# Patient Record
Sex: FEMALE | Race: OTHER | HISPANIC OR LATINO | Employment: UNEMPLOYED | URBAN - METROPOLITAN AREA
[De-identification: names, ages, dates, MRNs, and addresses within clinical notes are randomized per-mention and may not be internally consistent; named-entity substitution may affect disease eponyms.]

---

## 2021-12-30 ENCOUNTER — APPOINTMENT (EMERGENCY)
Dept: RADIOLOGY | Facility: HOSPITAL | Age: 46
End: 2021-12-30
Payer: COMMERCIAL

## 2021-12-30 ENCOUNTER — HOSPITAL ENCOUNTER (OUTPATIENT)
Facility: HOSPITAL | Age: 46
Setting detail: OBSERVATION
Discharge: HOME | End: 2022-01-01
Attending: EMERGENCY MEDICINE | Admitting: STUDENT IN AN ORGANIZED HEALTH CARE EDUCATION/TRAINING PROGRAM
Payer: COMMERCIAL

## 2021-12-30 ENCOUNTER — APPOINTMENT (EMERGENCY)
Dept: CT IMAGING | Facility: HOSPITAL | Age: 46
End: 2021-12-30
Payer: COMMERCIAL

## 2021-12-30 DIAGNOSIS — J10.1 INFLUENZA A: Primary | ICD-10-CM

## 2021-12-30 DIAGNOSIS — I10 PRIMARY HYPERTENSION: Chronic | ICD-10-CM

## 2021-12-30 DIAGNOSIS — I42.9 CARDIOMYOPATHY, UNSPECIFIED TYPE (HCC): ICD-10-CM

## 2021-12-30 DIAGNOSIS — E66.9 DIABETES MELLITUS TYPE 2 IN OBESE (HCC): Chronic | ICD-10-CM

## 2021-12-30 DIAGNOSIS — E11.69 DIABETES MELLITUS TYPE 2 IN OBESE (HCC): Chronic | ICD-10-CM

## 2021-12-30 DIAGNOSIS — R09.02 HYPOXIA: ICD-10-CM

## 2021-12-30 LAB
ALBUMIN SERPL BCP-MCNC: 3.5 G/DL (ref 3–5.2)
ALP SERPL-CCNC: 160 U/L (ref 43–122)
ALT SERPL W P-5'-P-CCNC: 34 U/L
ANION GAP SERPL CALCULATED.3IONS-SCNC: 4 MMOL/L (ref 5–14)
AST SERPL W P-5'-P-CCNC: 38 U/L (ref 14–36)
BASOPHILS # BLD AUTO: 0 THOUSANDS/ΜL (ref 0–0.1)
BASOPHILS NFR BLD AUTO: 1 % (ref 0–1)
BILIRUB SERPL-MCNC: 0.49 MG/DL
BUN SERPL-MCNC: 17 MG/DL (ref 5–25)
CALCIUM SERPL-MCNC: 8.6 MG/DL (ref 8.4–10.2)
CARDIAC TROPONIN I PNL SERPL HS: 79 NG/L
CHLORIDE SERPL-SCNC: 95 MMOL/L (ref 97–108)
CO2 SERPL-SCNC: 31 MMOL/L (ref 22–30)
CREAT SERPL-MCNC: 0.98 MG/DL (ref 0.6–1.2)
D DIMER PPP FEU-MCNC: 0.79 UG/ML FEU
EOSINOPHIL # BLD AUTO: 0 THOUSAND/ΜL (ref 0–0.4)
EOSINOPHIL NFR BLD AUTO: 0 % (ref 0–6)
ERYTHROCYTE [DISTWIDTH] IN BLOOD BY AUTOMATED COUNT: 13.5 %
FLUAV RNA RESP QL NAA+PROBE: POSITIVE
FLUBV RNA RESP QL NAA+PROBE: NEGATIVE
GFR SERPL CREATININE-BSD FRML MDRD: 68 ML/MIN/1.73SQ M
GLUCOSE SERPL-MCNC: 457 MG/DL (ref 70–99)
HCT VFR BLD AUTO: 44.5 % (ref 36–46)
HGB BLD-MCNC: 14.8 G/DL (ref 12–16)
LYMPHOCYTES # BLD AUTO: 0.8 THOUSANDS/ΜL (ref 0.5–4)
LYMPHOCYTES NFR BLD AUTO: 11 % (ref 25–45)
MAGNESIUM SERPL-MCNC: 1.6 MG/DL (ref 1.6–2.3)
MCH RBC QN AUTO: 29.3 PG (ref 26–34)
MCHC RBC AUTO-ENTMCNC: 33.2 G/DL (ref 31–36)
MCV RBC AUTO: 88 FL (ref 80–100)
MONOCYTES # BLD AUTO: 0.7 THOUSAND/ΜL (ref 0.2–0.9)
MONOCYTES NFR BLD AUTO: 10 % (ref 1–10)
NEUTROPHILS # BLD AUTO: 5.5 THOUSANDS/ΜL (ref 1.8–7.8)
NEUTS SEG NFR BLD AUTO: 79 % (ref 45–65)
NT-PROBNP SERPL-MCNC: 155 PG/ML (ref 0–299)
PHOSPHATE SERPL-MCNC: 4.1 MG/DL (ref 2.5–4.8)
PLATELET # BLD AUTO: 189 THOUSANDS/UL (ref 150–450)
PMV BLD AUTO: 10.4 FL (ref 8.9–12.7)
POTASSIUM SERPL-SCNC: 4.6 MMOL/L (ref 3.6–5)
PROT SERPL-MCNC: 7.3 G/DL (ref 5.9–8.4)
RBC # BLD AUTO: 5.04 MILLION/UL (ref 4–5.2)
RSV RNA RESP QL NAA+PROBE: NEGATIVE
SARS-COV-2 RNA RESP QL NAA+PROBE: NEGATIVE
SODIUM SERPL-SCNC: 130 MMOL/L (ref 137–147)
WBC # BLD AUTO: 7 THOUSAND/UL (ref 4.5–11)

## 2021-12-30 PROCEDURE — 99285 EMERGENCY DEPT VISIT HI MDM: CPT

## 2021-12-30 PROCEDURE — 84100 ASSAY OF PHOSPHORUS: CPT

## 2021-12-30 PROCEDURE — 85025 COMPLETE CBC W/AUTO DIFF WBC: CPT

## 2021-12-30 PROCEDURE — G1004 CDSM NDSC: HCPCS

## 2021-12-30 PROCEDURE — 71045 X-RAY EXAM CHEST 1 VIEW: CPT

## 2021-12-30 PROCEDURE — 85379 FIBRIN DEGRADATION QUANT: CPT

## 2021-12-30 PROCEDURE — 71275 CT ANGIOGRAPHY CHEST: CPT

## 2021-12-30 PROCEDURE — 0241U HB NFCT DS VIR RESP RNA 4 TRGT: CPT

## 2021-12-30 PROCEDURE — 83880 ASSAY OF NATRIURETIC PEPTIDE: CPT

## 2021-12-30 PROCEDURE — 96361 HYDRATE IV INFUSION ADD-ON: CPT

## 2021-12-30 PROCEDURE — 80053 COMPREHEN METABOLIC PANEL: CPT

## 2021-12-30 PROCEDURE — 96360 HYDRATION IV INFUSION INIT: CPT

## 2021-12-30 PROCEDURE — 84484 ASSAY OF TROPONIN QUANT: CPT

## 2021-12-30 PROCEDURE — 36415 COLL VENOUS BLD VENIPUNCTURE: CPT

## 2021-12-30 PROCEDURE — 83735 ASSAY OF MAGNESIUM: CPT

## 2021-12-30 PROCEDURE — 93005 ELECTROCARDIOGRAM TRACING: CPT

## 2021-12-30 RX ORDER — PRAVASTATIN SODIUM 20 MG
20 TABLET ORAL DAILY
Status: ON HOLD | COMMUNITY
End: 2022-01-01 | Stop reason: SDUPTHER

## 2021-12-30 RX ORDER — AMLODIPINE BESYLATE 5 MG/1
5 TABLET ORAL DAILY
Status: ON HOLD | COMMUNITY
End: 2022-01-01 | Stop reason: SDUPTHER

## 2021-12-30 RX ADMIN — IOHEXOL 100 ML: 350 INJECTION, SOLUTION INTRAVENOUS at 22:58

## 2021-12-30 RX ADMIN — SODIUM CHLORIDE 500 ML: 0.9 INJECTION, SOLUTION INTRAVENOUS at 21:41

## 2021-12-31 ENCOUNTER — APPOINTMENT (OUTPATIENT)
Dept: NON INVASIVE DIAGNOSTICS | Facility: HOSPITAL | Age: 46
End: 2021-12-31
Payer: COMMERCIAL

## 2021-12-31 PROBLEM — E66.9 DIABETES MELLITUS TYPE 2 IN OBESE (HCC): Chronic | Status: ACTIVE | Noted: 2021-12-31

## 2021-12-31 PROBLEM — R79.89 ELEVATED TROPONIN: Status: ACTIVE | Noted: 2021-12-31

## 2021-12-31 PROBLEM — I10 PRIMARY HYPERTENSION: Chronic | Status: ACTIVE | Noted: 2021-12-31

## 2021-12-31 PROBLEM — R77.8 ELEVATED TROPONIN: Status: ACTIVE | Noted: 2021-12-31

## 2021-12-31 PROBLEM — J10.1 INFLUENZA A H1N1 INFECTION: Status: ACTIVE | Noted: 2021-12-31

## 2021-12-31 PROBLEM — E11.69 DIABETES MELLITUS TYPE 2 IN OBESE (HCC): Chronic | Status: ACTIVE | Noted: 2021-12-31

## 2021-12-31 LAB
2HR DELTA HS TROPONIN: 12 NG/L
4HR DELTA HS TROPONIN: 10 NG/L
ANION GAP SERPL CALCULATED.3IONS-SCNC: 6 MMOL/L (ref 5–14)
AORTIC ROOT: 2.8 CM
AORTIC VALVE MEAN VELOCITY: 11.8 M/S
ATRIAL RATE: 68 BPM
AV AREA PEAK VELOCITY: 2 CM2
AV LVOT MEAN GRADIENT: 3 MMHG
AV LVOT PEAK GRADIENT: 6 MMHG
AV MEAN GRADIENT: 7 MMHG
AV PEAK GRADIENT: 14 MMHG
AV VALVE AREA: 2.11 CM2
AV VELOCITY RATIO: 0.66
BUN SERPL-MCNC: 18 MG/DL (ref 5–25)
CALCIUM SERPL-MCNC: 8.2 MG/DL (ref 8.4–10.2)
CARDIAC TROPONIN I PNL SERPL HS: 89 NG/L
CARDIAC TROPONIN I PNL SERPL HS: 91 NG/L
CHLORIDE SERPL-SCNC: 107 MMOL/L (ref 97–108)
CO2 SERPL-SCNC: 19 MMOL/L (ref 22–30)
CREAT SERPL-MCNC: 0.72 MG/DL (ref 0.6–1.2)
DOP CALC AO PEAK VEL: 1.8 M/S
DOP CALC AO VTI: 35.04 CM
DOP CALC LVOT AREA: 3.14 CM2
DOP CALC LVOT DIAMETER: 2 CM
DOP CALC LVOT PEAK VEL VTI: 23.56 CM
DOP CALC LVOT PEAK VEL: 1.19 M/S
DOP CALC LVOT STROKE INDEX: 37.2 ML/M2
DOP CALC LVOT STROKE VOLUME: 73.98 CM3
E WAVE DECELERATION TIME: 235 MS
FRACTIONAL SHORTENING: 36 % (ref 28–44)
GFR SERPL CREATININE-BSD FRML MDRD: 100 ML/MIN/1.73SQ M
GLUCOSE SERPL-MCNC: 262 MG/DL (ref 65–140)
GLUCOSE SERPL-MCNC: 280 MG/DL (ref 70–99)
GLUCOSE SERPL-MCNC: 355 MG/DL (ref 65–140)
GLUCOSE SERPL-MCNC: 385 MG/DL (ref 65–140)
GLUCOSE SERPL-MCNC: 414 MG/DL (ref 65–140)
GLUCOSE SERPL-MCNC: 480 MG/DL (ref 65–140)
GLUCOSE SERPL-MCNC: >500 MG/DL (ref 65–140)
INTERVENTRICULAR SEPTUM IN DIASTOLE (PARASTERNAL SHORT AXIS VIEW): 1.2 CM
LEFT ATRIUM AREA SYSTOLE SINGLE PLANE A4C: 15.3 CM2
LEFT INTERNAL DIMENSION IN SYSTOLE: 2.8 CM (ref 2.1–4)
LEFT VENTRICULAR INTERNAL DIMENSION IN DIASTOLE: 4.4 CM (ref 6.91–10.3)
LEFT VENTRICULAR POSTERIOR WALL IN END DIASTOLE: 1.1 CM
LEFT VENTRICULAR STROKE VOLUME: 56 ML
MV E'TISSUE VEL-SEP: 9 CM/S
MV PEAK A VEL: 0.57 M/S
MV PEAK E VEL: 85 CM/S
MV STENOSIS PRESSURE HALF TIME: 0 MS
P AXIS: 18 DEGREES
POTASSIUM SERPL-SCNC: 4.6 MMOL/L (ref 3.6–5)
PR INTERVAL: 136 MS
QRS AXIS: 60 DEGREES
QRSD INTERVAL: 74 MS
QT INTERVAL: 422 MS
QTC INTERVAL: 448 MS
RIGHT ATRIUM AREA SYSTOLE A4C: 14.5 CM2
RIGHT VENTRICLE ID DIMENSION: 3.9 CM
SL CV LV EF: 65
SL CV PED ECHO LEFT VENTRICLE DIASTOLIC VOLUME (MOD BIPLANE) 2D: 86 ML
SL CV PED ECHO LEFT VENTRICLE SYSTOLIC VOLUME (MOD BIPLANE) 2D: 30 ML
SODIUM SERPL-SCNC: 132 MMOL/L (ref 137–147)
T WAVE AXIS: 54 DEGREES
TRICUSPID VALVE S': 1.2 CM/S
VENTRICULAR RATE: 68 BPM
Z-SCORE OF LEFT VENTRICULAR DIMENSION IN END SYSTOLE: -6.51

## 2021-12-31 PROCEDURE — 84484 ASSAY OF TROPONIN QUANT: CPT

## 2021-12-31 PROCEDURE — 80048 BASIC METABOLIC PNL TOTAL CA: CPT | Performed by: PHYSICIAN ASSISTANT

## 2021-12-31 PROCEDURE — 93010 ELECTROCARDIOGRAM REPORT: CPT | Performed by: INTERNAL MEDICINE

## 2021-12-31 PROCEDURE — 93306 TTE W/DOPPLER COMPLETE: CPT

## 2021-12-31 PROCEDURE — 82948 REAGENT STRIP/BLOOD GLUCOSE: CPT

## 2021-12-31 PROCEDURE — 93306 TTE W/DOPPLER COMPLETE: CPT | Performed by: INTERNAL MEDICINE

## 2021-12-31 PROCEDURE — 99220 PR INITIAL OBSERVATION CARE/DAY 70 MINUTES: CPT | Performed by: STUDENT IN AN ORGANIZED HEALTH CARE EDUCATION/TRAINING PROGRAM

## 2021-12-31 RX ORDER — ORAL SEMAGLUTIDE 7 MG/1
1 TABLET ORAL DAILY
Status: ON HOLD | COMMUNITY
End: 2022-01-01 | Stop reason: SDUPTHER

## 2021-12-31 RX ORDER — SODIUM CHLORIDE 9 MG/ML
100 INJECTION, SOLUTION INTRAVENOUS CONTINUOUS
Status: DISCONTINUED | OUTPATIENT
Start: 2021-12-31 | End: 2022-01-01 | Stop reason: HOSPADM

## 2021-12-31 RX ORDER — ONDANSETRON 2 MG/ML
4 INJECTION INTRAMUSCULAR; INTRAVENOUS EVERY 6 HOURS PRN
Status: DISCONTINUED | OUTPATIENT
Start: 2021-12-31 | End: 2022-01-01 | Stop reason: HOSPADM

## 2021-12-31 RX ORDER — OSELTAMIVIR PHOSPHATE 75 MG/1
75 CAPSULE ORAL EVERY 12 HOURS SCHEDULED
Status: DISCONTINUED | OUTPATIENT
Start: 2021-12-31 | End: 2022-01-01 | Stop reason: HOSPADM

## 2021-12-31 RX ORDER — ACETAMINOPHEN 325 MG/1
650 TABLET ORAL EVERY 4 HOURS PRN
Status: DISCONTINUED | OUTPATIENT
Start: 2021-12-31 | End: 2022-01-01 | Stop reason: HOSPADM

## 2021-12-31 RX ORDER — AMLODIPINE BESYLATE 5 MG/1
5 TABLET ORAL DAILY
Status: DISCONTINUED | OUTPATIENT
Start: 2021-12-31 | End: 2022-01-01 | Stop reason: HOSPADM

## 2021-12-31 RX ORDER — PRAVASTATIN SODIUM 20 MG
20 TABLET ORAL
Status: DISCONTINUED | OUTPATIENT
Start: 2021-12-31 | End: 2022-01-01 | Stop reason: HOSPADM

## 2021-12-31 RX ADMIN — INSULIN LISPRO 4 UNITS: 100 INJECTION, SOLUTION INTRAVENOUS; SUBCUTANEOUS at 08:35

## 2021-12-31 RX ADMIN — INSULIN LISPRO 6 UNITS: 100 INJECTION, SOLUTION INTRAVENOUS; SUBCUTANEOUS at 01:48

## 2021-12-31 RX ADMIN — INSULIN DETEMIR 30 UNITS: 100 INJECTION, SOLUTION SUBCUTANEOUS at 21:40

## 2021-12-31 RX ADMIN — SODIUM CHLORIDE 100 ML/HR: 0.9 INJECTION, SOLUTION INTRAVENOUS at 19:35

## 2021-12-31 RX ADMIN — SITAGLIPTIN 100 MG: 50 TABLET, FILM COATED ORAL at 08:32

## 2021-12-31 RX ADMIN — PRAVASTATIN SODIUM 20 MG: 20 TABLET ORAL at 17:11

## 2021-12-31 RX ADMIN — INSULIN LISPRO 6 UNITS: 100 INJECTION, SOLUTION INTRAVENOUS; SUBCUTANEOUS at 11:32

## 2021-12-31 RX ADMIN — INSULIN LISPRO 6 UNITS: 100 INJECTION, SOLUTION INTRAVENOUS; SUBCUTANEOUS at 01:47

## 2021-12-31 RX ADMIN — INSULIN LISPRO 5 UNITS: 100 INJECTION, SOLUTION INTRAVENOUS; SUBCUTANEOUS at 16:31

## 2021-12-31 RX ADMIN — SODIUM CHLORIDE 100 ML/HR: 0.9 INJECTION, SOLUTION INTRAVENOUS at 01:26

## 2021-12-31 RX ADMIN — ENOXAPARIN SODIUM 40 MG: 40 INJECTION SUBCUTANEOUS at 08:33

## 2021-12-31 RX ADMIN — INSULIN LISPRO 5 UNITS: 100 INJECTION, SOLUTION INTRAVENOUS; SUBCUTANEOUS at 11:33

## 2021-12-31 RX ADMIN — ACETAMINOPHEN 650 MG: 325 TABLET ORAL at 08:32

## 2021-12-31 RX ADMIN — SODIUM CHLORIDE 500 ML: 0.9 INJECTION, SOLUTION INTRAVENOUS at 00:49

## 2021-12-31 RX ADMIN — INSULIN LISPRO 6 UNITS: 100 INJECTION, SOLUTION INTRAVENOUS; SUBCUTANEOUS at 21:39

## 2021-12-31 RX ADMIN — ACETAMINOPHEN 650 MG: 325 TABLET ORAL at 16:33

## 2021-12-31 RX ADMIN — OSELTAMIVIR PHOSPHATE 75 MG: 75 CAPSULE ORAL at 21:39

## 2021-12-31 RX ADMIN — OSELTAMIVIR PHOSPHATE 75 MG: 75 CAPSULE ORAL at 01:12

## 2021-12-31 RX ADMIN — AMLODIPINE BESYLATE 5 MG: 5 TABLET ORAL at 08:32

## 2021-12-31 RX ADMIN — INSULIN LISPRO 6 UNITS: 100 INJECTION, SOLUTION INTRAVENOUS; SUBCUTANEOUS at 16:30

## 2021-12-31 RX ADMIN — OSELTAMIVIR PHOSPHATE 75 MG: 75 CAPSULE ORAL at 08:38

## 2021-12-31 RX ADMIN — INSULIN LISPRO 5 UNITS: 100 INJECTION, SOLUTION INTRAVENOUS; SUBCUTANEOUS at 08:35

## 2021-12-31 NOTE — ED NOTES
Patient noted to be sleeping - no apparent discomfort or distress noted       Chester Dominique RN  12/31/21 3170

## 2021-12-31 NOTE — UTILIZATION REVIEW
Initial Clinical Review    Admission: Date/Time/Statement:   Admission Orders (From admission, onward)     Ordered        12/31/21 0032  Place in Observation  Once                      Orders Placed This Encounter   Procedures    Place in Observation     Standing Status:   Standing     Number of Occurrences:   1     Order Specific Question:   Level of Care     Answer:   Med Surg [16]     ED Arrival Information     Expected Arrival Acuity    - 12/30/2021 20:09 Urgent         Means of arrival Escorted by Service Admission type    Ambulance [facility] Hospitalist Urgent         Arrival complaint    SOB        Chief Complaint   Patient presents with    Fatigue     SOB, body aches, cough       Initial Presentation:     51year old female presents to ed from home via ems for evaluation and treatment of shortness of breath, cough and body aches  Patient reports that she ran out of all of her medications 6 days ago  Clinical assessment significant for temp 99 5, O2 sat 92% ra  Influenza A positive, D-dimer 0 79, troponin 79, sodium 130 co2 31 an gap 4, glucose 457  Imaging without acute finding  PMHX:  DM, NOT ON HOME O2, MI, CAD , COPD, ENLARGED HEART   Treated in ed with tamiflu, iv  9% ns bolus and 2L o2nc  Admit to observation for influenza A  Date: 12-31-21 Day 2: observation    Troponin  91 / 89  Obtain ECHO to evaluate enlarged heart, continue 2L O2nc weaning to room air  Continue tamiflu and iv fluids 100/hr  Monitor glucose and resume sq insulin sliding scale        ED Triage Vitals   12/30/21 2012 12/30/21 2012 12/30/21 2012 12/30/21 2012 12/30/21 2012   99 5 °F (37 5 °C) 74 20 122/61 94 %      Tympanic Monitor         Pain Score       5          12/31/21 112 kg (247 lb 9 2 oz)     Additional Vital Signs:       Date/  Time Temp Pulse Resp BP MAP SpO2 Nasal Cannula O2 Flow Rate (L/min)   12/31/21 0732 97 8 °F (36 6 °C) 60 20 137/77 93 97 % 2 L/min   12/31/21 0131 97 7 °F (36 5 °C) 80 17 110/57 -- 92 % 2 L/min   12/31/21 0100 -- 67 18 135/69 -- 97 % 2 L/min   12/31/21 0000 -- 67 18 137/70 -- 98 % 2 L/min   12/30/21 2354 -- -- -- -- -- -- --   12/30/21 2330 -- 67 20 135/74 -- 99 % 2 L/min   12/30/21 2200 98 9 °F (37 2 °C) 71 20 113/53 -- 96 % 2 L/min   12/30/21 2012 99 5 °F (37 5 °C) 74 20 122/61 -- 94 % --           Pertinent Labs/Diagnostic Test Results:     CTA ED chest PE study   Final (12/30 2354)      No pulmonary embolism or acute pulmonary process is seen  The heart appears enlarged  Cholelithiasis without discrete evidence of acute cholecystitis          Results from last 7 days   Lab Units 12/30/21 2124   SARS-COV-2  Negative     Results from last 7 days   Lab Units 12/30/21  2142   WBC Thousand/uL 7 00   HEMOGLOBIN g/dL 14 8   HEMATOCRIT % 44 5   PLATELETS Thousands/uL 189   NEUTROS ABS Thousands/µL 5 50         Results from last 7 days   Lab Units 12/31/21  0546 12/30/21  2142   SODIUM mmol/L 132* 130*   POTASSIUM mmol/L 4 6 4 6   CHLORIDE mmol/L 107 95*   CO2 mmol/L 19* 31*   ANION GAP mmol/L 6 4*   BUN mg/dL 18 17   CREATININE mg/dL 0 72 0 98   EGFR ml/min/1 73sq m 100 68   CALCIUM mg/dL 8 2* 8 6   MAGNESIUM mg/dL  --  1 6   PHOSPHORUS mg/dL  --  4 1     Results from last 7 days   Lab Units 12/30/21  2142   AST U/L 38*   ALT U/L 34   ALK PHOS U/L 160*   TOTAL PROTEIN g/dL 7 3   ALBUMIN g/dL 3 5   TOTAL BILIRUBIN mg/dL 0 49     Results from last 7 days   Lab Units 12/31/21  0544 12/31/21  0127   POC GLUCOSE mg/dl 262* 414*     Results from last 7 days   Lab Units 12/31/21  0546 12/30/21  2142   GLUCOSE RANDOM mg/dL 280* 457*         Results from last 7 days   Lab Units 12/31/21  0200 12/30/21  2326 12/30/21 2142   HS TNI 0HR ng/L  --   --  79*   HS TNI 2HR ng/L  --  91*  --    HSTNI D2 ng/L  --  12  --    HS TNI 4HR ng/L 89*  --   --    HSTNI D4 ng/L 10  --   --      Results from last 7 days   Lab Units 12/30/21 2142   D-DIMER QUANTITATIVE ug/ml FEU 0 79*     Results from last 7 days   Lab Units 12/30/21  2142   NT-PRO BNP pg/mL 155       Results from last 7 days   Lab Units 12/30/21  2124   INFLUENZA A PCR  Positive*   INFLUENZA B PCR  Negative   RSV PCR  Negative       ED Treatment:   Medication Administration from 12/30/2021 2009 to 12/31/2021 0117       Date/Time Order Dose Route Action     12/30/2021 2141 sodium chloride 0 9 % bolus 500 mL 500 mL Intravenous New Bag     12/31/2021 0049 sodium chloride 0 9 % bolus 500 mL 500 mL Intravenous New Bag     12/31/2021 0112 oseltamivir (TAMIFLU) capsule 75mg 75 mg Oral Given     Past Medical History:   Diagnosis    Diabetes mellitus ([facility] )    Hypertension     Present on Admission:   Influenza A H1N1 infection   Diabetes mellitus type 2 in obese ([facility] )   Primary hypertension   Elevated troponin      Admitting Diagnosis: Fatigue [R53 83]  Influenza A [J10 1]  Hypoxia [R09 02]  Age/Sex: 51 y o  female    Scheduled Medications:    amLODIPine, 5 mg, Oral, Daily  enoxaparin, 40 mg, Subcutaneous, Daily  insulin detemir, 30 Units, Subcutaneous, HS  insulin lispro, 1-6 Units, Subcutaneous, TID AC  insulin lispro, 1-6 Units, Subcutaneous, HS  insulin lispro, 5 Units, Subcutaneous, TID With Meals  oseltamivir, 75 mg, Oral, Q12H ROSLYN  pravastatin, 20 mg, Oral, After Dinner  sitaGLIPtin, 100 mg, Oral, Daily      Continuous IV Infusions:  sodium chloride, 100 mL/hr, Intravenous, Continuous      PRN Meds:  acetaminophen, 650 mg, Oral, Q4H PRN  ondansetron, 4 mg, Intravenous, Q6H PRN        IP CONSULT TO CASE MANAGEMENT    Network Utilization Review Department  ATTENTION: Please call with any questions or concerns to [phone] and carefully listen to the prompts so that you are directed to the right person  All voicemails are confidential   [facility] all requests for admission clinical reviews, approved or denied determinations and any other requests to dedicated fax number below belonging to the campus where the patient is receiving treatment   List of dedicated fax numbers for the Facilities:  [address] [place] [address] DENIALS (Administrative/Medical Necessity) [phone]   [address] (Maternity/NICU/Pediatrics) [phone] [address]  [address] [address] [place] [address] [address] [facility] [phone]

## 2021-12-31 NOTE — MALNUTRITION/BMI
This medical record reflects one or more clinical indicators suggestive of malnutrition and/or morbid obesity  Malnutrition Findings:              BMI Findings:  Adult BMI Classifications: Morbid Obesity 45-49 9     Body mass index is 46 67 kg/m²  See Nutrition note dated 12/31/21 for additional details  Completed nutrition assessment is viewable in the nutrition documentation

## 2021-12-31 NOTE — ASSESSMENT & PLAN NOTE
· Patient with influenza symptoms and shortness breast  · Found to be 88% and was placed on nasal cannula  · Start Tamiflu  · Supportive care for influenza symptoms

## 2021-12-31 NOTE — ED NOTES
Occasional cough noted  Patient rests quietly when not disturbed       Torie Strickland RN  12/31/21 1359

## 2021-12-31 NOTE — ASSESSMENT & PLAN NOTE
· Likely secondary influenza  · Continue to trend and determine need of cardiology consult in the a m  · She did report chest pain and recent travel, had elevated D-dimer hi-CTA negative for PE    Was noted to have enlarged heart on CT - previously known and never followed up  · Will obtain echo

## 2021-12-31 NOTE — H&P
[facility]&P- Guadelupe Gaucher 1975, 51 y o  female MRN: [id number]  Unit/Bed#: 7T [facility] 716-01 Encounter: [id number]  Primary Care Provider: No primary care provider on file  Date and time admitted to hospital: 12/30/2021  8:09 PM    * Influenza A H1N1 infection  Assessment & Plan  · Patient with influenza symptoms and shortness breast  · Found to be 88% and was placed on nasal cannula  · Start Tamiflu  · Supportive care for influenza symptoms    Elevated troponin  Assessment & Plan  · Likely secondary influenza  · Continue to trend and determine need of cardiology consult in the a m  · She did report chest pain and recent travel, had elevated D-dimer hi-CTA negative for PE  Was noted to have enlarged heart on CT - previously known and never followed up  · Will obtain echo    Primary hypertension  Assessment & Plan  · Continue amlodipine    Diabetes mellitus type 2 in [facility])  Assessment & Plan  · Patient reports that she has ran out of her medication  · Insulin coverage while in the hospital      TeleMedicine H&P - [facility] Internal Medicine    Patient Information: Guadelupe Gaucher 51 y o  female MRN: [id number]  Unit/Bed#: 7T [facility] 716-01 Encounter: [id number]  Admitting Physician:  Dr Ellis Pastor  PCP: No primary care provider on file  Date of Admission:  12/31/21    REQUIRED DOCUMENTATION:     1  This service was provided via Telemedicine  2  Provider located at [facility]  3  TeleMed provider: Livia Walsh PA-C   4  Identify all parties in room with patient during tele consult:  Tyler Douglass RN  5  After connecting through [employer], patient was identified by name and date of birth and assistant checked wristband  Patient was then informed that this was a Telemedicine visit and that the exam was being conducted confidentially over secure lines  My office door was closed  No one else was in the room    Patient acknowledged consent and understanding of privacy and security of the Telemedicine visit, and gave us permission to have the assistant stay in the room in order to assist with the history and to conduct the exam   I informed the patient that I have reviewed their record in Epic and presented the opportunity for them to ask any questions regarding the visit today  The patient agreed to participate  VTE Prophylaxis: Enoxaparin (Lovenox)  / sequential compression device   Code Status: full code  Discussion with patient    Anticipated Length of Stay:  Patient will be admitted on an Observation basis with an anticipated length of stay of  < 2 midnights  Justification for Hospital Stay: Supportive care for flu    Chief Complaint:   Shortness of breath, body aches, cough    History of Present Illness:    Marty Gibbons is a 51 y o  female who has past medical history of diabetes mellitus type 2 insulin dependent, hypertension, hyperlipidemia, cardiomyopathy but not further evaluated presents with shortness breath, body aches, cough  Patient reports that she is new to the area and moved from [place]  She is ran out of her medication approximately 6 days ago  She has had 3 days of shortness of breath, fatigue, myalgias, cough, chest pain from coughing, nausea, vomiting and fever  She was found to be influenza A positive  Took Tylenol at home, not much help  No home oxygen, here on 3L NC  She has SOB at home and when she wakes in the morning    Review of Systems:    Review of Systems   Constitutional: Positive for chills, fatigue and fever  HENT: Positive for sore throat  Negative for rhinorrhea  Eyes: Negative for discharge and redness  Respiratory: Negative for cough and shortness of breath  Cardiovascular: Positive for chest pain and leg swelling  Gastrointestinal: Positive for nausea and vomiting  Negative for abdominal pain and diarrhea  Genitourinary: Negative for dysuria     Musculoskeletal: Positive for arthralgias and myalgias  Skin: Negative for rash and wound  Neurological: Positive for dizziness, weakness and headaches  Psychiatric/Behavioral: Negative for agitation and confusion  Past Medical and Surgical History:     Past Medical History:   Diagnosis Date    Diabetes mellitus ([facility] )     Hypertension        Past Surgical History:   Procedure Laterality Date     SECTION      FINGER SURGERY         Meds/Allergies:    Prior to Admission medications    Medication Sig Start Date End Date Taking? Authorizing Provider   amLODIPine (NORVASC) 5 mg tablet Take 5 mg by mouth daily   Yes Historical Provider, MD   pravastatin (PRAVACHOL) 20 mg tablet Take 20 mg by mouth daily   Yes Historical Provider, MD   sitaGLIPtin (JANUVIA) 100 mg tablet Take 100 mg by mouth daily   Yes Historical Provider, MD     all medications and allergies reviewed    Allergies: Allergies   Allergen Reactions    Asa [Aspirin] Other (See Comments)     Throat closes       Social History:     Marital Status: Single   Patient Pre-hospital Living Situation:  Home  Patient Pre-hospital Level of Mobility:  Mobile  Patient Pre-hospital Diet Restrictions:  None  Substance Use History:   Social History     Substance and Sexual Activity   Alcohol Use Never     Social History     Tobacco Use   Smoking Status Never Smoker   Smokeless Tobacco Never Used     Social History     Substance and Sexual Activity   Drug Use Never       Family History:  I have reviewed the patients family history     Physical Exam:     Vitals:   Blood Pressure: 110/57 (21)  Pulse: 80 (21)  Temperature: 97 7 °F (36 5 °C) (21)  Temp Source: Temporal (21)  Respirations: 17 (21)  Height: 5' 1" (154 9 cm) (21)  Weight - Scale: 112 kg (247 lb 9 2 oz) (21)  SpO2: 92 % (21)    Physical Exam  Vitals reviewed  Constitutional:       Appearance: She is morbidly obese   She is ill-appearing  Interventions: Nasal cannula in place  HENT:      Head: Normocephalic and atraumatic  Nose: Nose normal    Eyes:      General:         Right eye: No discharge  Left eye: No discharge  Extraocular Movements: Extraocular movements intact  Neck:      Comments: Able to turn head side to side without difficulty  Cardiovascular:      Rate and Rhythm: Normal rate  Comments: Rate 80  Pulmonary:      Comments: Patient able speak without difficulty  Abdominal:      Comments: No report of abdominal pain   Musculoskeletal:      Comments: Patient reports body ache and pain secondary to coughing and influenza   Neurological:      General: No focal deficit present  Mental Status: She is alert and oriented to person, place, and time  Mental status is at baseline  Motor: Weakness present  Psychiatric:         Mood and Affect: Mood normal          Behavior: Behavior normal            Additional Data:     Lab Results: I have personally reviewed pertinent reports  Results from last 7 days   Lab Units 12/30/21  2142   WBC Thousand/uL 7 00   HEMOGLOBIN g/dL 14 8   HEMATOCRIT % 44 5   PLATELETS Thousands/uL 189   NEUTROS PCT % 79*   LYMPHS PCT % 11*   MONOS PCT % 10   EOS PCT % 0     Results from last 7 days   Lab Units 12/30/21  2142   SODIUM mmol/L 130*   POTASSIUM mmol/L 4 6   CHLORIDE mmol/L 95*   CO2 mmol/L 31*   BUN mg/dL 17   CREATININE mg/dL 0 98   ANION GAP mmol/L 4*   CALCIUM mg/dL 8 6   ALBUMIN g/dL 3 5   TOTAL BILIRUBIN mg/dL 0 49   ALK PHOS U/L 160*   ALT U/L 34   AST U/L 38*   GLUCOSE RANDOM mg/dL 457*     Imaging: I have personally reviewed pertinent reports  CTA ED chest PE study   Final Result by Jomar Erickson DO (12/30 7412)      No pulmonary embolism or acute pulmonary process is seen  The heart appears enlarged  Cholelithiasis without discrete evidence of acute cholecystitis  Other findings as above  Workstation performed: [id number]         XR chest 1 view portable    (Results Pending)     Epic / Care Everywhere Records Reviewed: Yes    ** Please Note: This note has been constructed using a voice recognition system   **

## 2021-12-31 NOTE — ED NOTES
Pt given water   Tolerating well     Malikia A [facility]  12/30/21 3495

## 2021-12-31 NOTE — ED NOTES
Patient reports generalized aches and discomfort  Patient talking on phone - in no apparent acute distress       Pratt Higganum, RN  12/30/21 0829

## 2021-12-31 NOTE — ED PROVIDER NOTES
History  Chief Complaint   Patient presents with    Fatigue     SOB, body aches, cough     50-year-old female 3 days of shortness of breath, fatigue, myalgias, arthralgias, cough and chest pain when coughing, nausea vomiting, fever  She reports past medical historypast medical history of diabetes mellitus that is insulin dependent, hypertension, "enlarged heart", hyperlipidemia  Denies past medical history of asthma, COPD, heart failure, MI, coronary artery disease  Denies smoking  Is vaccinated against COVID-19 and influenza  Received 2 doses of COVID-19 vaccine  She also reports that she ran out of all her medications 6 days ago  Reports past surgical history of  section and finger surgery         History provided by:  Patient   used: Yes    Fatigue  Context: change in medication (ran out of medications 6 days ago )    Context: not alcohol use, not allergies, not decreased sleep, not dehydration, not drug use, not increased activity, not pinched nerve, not recent infection, not stress and not urinary tract infection    Relieved by:  Nothing  Worsened by:  Nothing  Ineffective treatments:  None tried  Associated symptoms: arthralgias, chest pain, cough, difficulty walking (says she can barely walk due to pains and worsening SOB today ), fever (subjective, reports they improved with tylenol ), myalgias, nausea, shortness of breath and vomiting    Associated symptoms: no abdominal pain, no anorexia, no aphasia, no ataxia, no diarrhea, no dizziness, no drooling, no dysphagia, no dysuria, no numbness in extremities, no falls, no foul-smelling urine, no frequency, no headaches, no hematochezia, no lethargy, no loss of consciousness, no melena, no near-syncope, no seizures, no sensory-motor deficit, no stroke symptoms, no syncope, no urgency and no vision change    Chest pain:     Quality: pressure      Severity:  Moderate    Onset quality:  Sudden    Duration:  6 hours    Timing: Constant    Progression:  Unchanged    Chronicity:  New  Cough:     Cough characteristics:  Productive    Sputum characteristics:  Yellow and green    Severity:  Moderate    Duration:  3 days    Timing:  Constant  Shortness of breath:     Duration:  3 days    Progression:  Worsening (worsening today )  Vomiting:     Quality:  Stomach contents    Number of occurrences:  4    Duration:  1 day    Vomiting progression: can tolerate water       Prior to Admission Medications   Prescriptions Last Dose Informant Patient Reported? Taking? Insulin Degludec (TRESIBA FLEXTOUCH SC)   Yes Yes   Sig: Inject 30 Units under the skin daily   Semaglutide (Rybelsus) 7 MG TABS   Yes Yes   Sig: Take 1 tablet by mouth in the morning   amLODIPine (NORVASC) 5 mg tablet   Yes Yes   Sig: Take 5 mg by mouth daily   pravastatin (PRAVACHOL) 20 mg tablet   Yes Yes   Sig: Take 20 mg by mouth daily   sitaGLIPtin (JANUVIA) 100 mg tablet   Yes Yes   Sig: Take 100 mg by mouth daily      Facility-Administered Medications: None       Past Medical History:   Diagnosis Date    Diabetes mellitus ([facility] [address] )     Hypertension        Past Surgical History:   Procedure Laterality Date     SECTION      FINGER SURGERY         Family History   Problem Relation Age of Onset    Hypertension Mother     Diabetes Mother     Hypertension Father     Diabetes Father      I have reviewed and agree with the history as documented  E-Cigarette/Vaping     E-Cigarette/Vaping Substances     Social History     Tobacco Use    Smoking status: Never Smoker    Smokeless tobacco: Never Used   Substance Use Topics    Alcohol use: Never    Drug use: Never       Review of Systems   Constitutional: Positive for activity change, chills, fatigue and fever (subjective, reports they improved with tylenol )  HENT: Negative for congestion, drooling, ear pain and sore throat  Eyes: Negative for pain and visual disturbance     Respiratory: Positive for cough and shortness of breath  Cardiovascular: Positive for chest pain  Negative for palpitations, syncope and near-syncope  Gastrointestinal: Positive for nausea and vomiting  Negative for abdominal pain, anorexia, diarrhea, dysphagia, hematochezia and melena  Genitourinary: Negative for decreased urine volume, dysuria, frequency, hematuria and urgency  Musculoskeletal: Positive for arthralgias and myalgias  Negative for back pain, falls and neck stiffness  Skin: Negative for color change and rash  Neurological: Negative for dizziness, seizures, loss of consciousness, syncope, weakness, numbness and headaches  Psychiatric/Behavioral: Negative for confusion  All other systems reviewed and are negative  Physical Exam  Physical Exam  Vitals and nursing note reviewed  Constitutional:       General: She is awake  She is in acute distress (patient was crying upon arrival, said that she couldn't move because of pain)  Appearance: Normal appearance  She is morbidly obese  She is not toxic-appearing or diaphoretic  HENT:      Head: Normocephalic and atraumatic  Jaw: No swelling  Right Ear: External ear normal       Left Ear: External ear normal       Mouth/Throat:      Lips: Pink  Mouth: Mucous membranes are moist       Pharynx: Oropharynx is clear  Eyes:      General: Lids are normal  Vision grossly intact  Right eye: No discharge  Left eye: No discharge  Conjunctiva/sclera: Conjunctivae normal       Pupils: Pupils are equal, round, and reactive to light  Neck:      Trachea: Phonation normal    Cardiovascular:      Rate and Rhythm: Normal rate and regular rhythm  Heart sounds: Normal heart sounds  No murmur heard  Comments: No pitting edema   Pulmonary:      Effort: Pulmonary effort is normal  No accessory muscle usage, respiratory distress or retractions  Breath sounds: Normal breath sounds  No stridor  No rales        Comments: Distant breath sounds Abdominal:      General: There is no distension  Tenderness: There is no abdominal tenderness  Musculoskeletal:      Cervical back: Normal range of motion and neck supple  Comments: No pitting edema    Skin:     General: Skin is warm and dry  Coloration: Skin is not jaundiced or pale  Findings: No rash  Neurological:      Mental Status: She is alert  Gait: Gait normal    Psychiatric:         Mood and Affect: Mood normal          Behavior: Behavior normal  Behavior is cooperative  Thought Content:  Thought content normal          Vital Signs  ED Triage Vitals   Temperature Pulse Respirations Blood Pressure SpO2   12/30/21 2012 12/30/21 2012 12/30/21 2012 12/30/21 2012 12/30/21 2012   99 5 °F (37 5 °C) 74 20 122/61 94 %      Temp Source Heart Rate Source Patient Position - Orthostatic VS BP Location FiO2 (%)   12/30/21 2012 12/30/21 2012 12/30/21 2012 12/30/21 2012 --   Tympanic Monitor Lying Left arm       Pain Score       12/31/21 0214       5           Vitals:    12/31/21 0900 12/31/21 1532 12/31/21 2300 01/01/22 0801   BP: 137/77 151/73 152/99 117/54   Pulse: 60 57 59 60   Patient Position - Orthostatic VS:  Lying Lying Lying         Visual Acuity      ED Medications  Medications   oseltamivir (TAMIFLU) capsule 75 mg (75 mg Oral Given 1/1/22 0840)   sodium chloride 0 9 % infusion (100 mL/hr Intravenous New Bag 12/31/21 1935)   acetaminophen (TYLENOL) tablet 650 mg (650 mg Oral Given 1/1/22 0836)   ondansetron (ZOFRAN) injection 4 mg (has no administration in time range)   enoxaparin (LOVENOX) subcutaneous injection 40 mg (40 mg Subcutaneous Given 1/1/22 0836)   insulin lispro (HumaLOG) 100 units/mL subcutaneous injection 5 Units (5 Units Subcutaneous Given 1/1/22 0839)   insulin lispro (HumaLOG) 100 units/mL subcutaneous injection 1-6 Units (3 Units Subcutaneous Given 1/1/22 0839)   insulin lispro (HumaLOG) 100 units/mL subcutaneous injection 1-6 Units (6 Units Subcutaneous Given 12/31/21 2139)   pravastatin (PRAVACHOL) tablet 20 mg (20 mg Oral Given 12/31/21 1711)   amLODIPine (NORVASC) tablet 5 mg (5 mg Oral Given 1/1/22 0836)   sitaGLIPtin (JANUVIA) tablet 100 mg (100 mg Oral Given 1/1/22 0836)   insulin detemir (LEVEMIR) subcutaneous injection 30 Units (30 Units Subcutaneous Given 12/31/21 2140)   sodium chloride 0 9 % bolus 500 mL (0 mL Intravenous Stopped 12/31/21 0043)   iohexol (OMNIPAQUE) 350 MG/ML injection (SINGLE-DOSE) 100 mL (100 mL Intravenous Given 12/30/21 2258)   sodium chloride 0 9 % bolus 500 mL (500 mL Intravenous New Bag 12/31/21 0049)   insulin lispro (HumaLOG) 100 units/mL subcutaneous injection 6 Units (6 Units Subcutaneous Given 12/31/21 0148)       Diagnostic Studies  Results Reviewed     Procedure Component Value Units Date/Time    Basic metabolic panel [[id number]]  (Abnormal) Collected: 12/31/21 0546    Lab Status: Final result Specimen: Blood from Hand, Right Updated: 12/31/21 0653     Sodium 132 mmol/L      Potassium 4 6 mmol/L      Chloride 107 mmol/L      CO2 19 mmol/L      ANION GAP 6 mmol/L      BUN 18 mg/dL      Creatinine 0 72 mg/dL      Glucose 280 mg/dL      Calcium 8 2 mg/dL      eGFR 100 ml/min/1 73sq m     Narrative:      Hemolysis  National [employer] guidelines for Chronic Kidney Disease (CKD):     Stage 1 with normal or high GFR (GFR > 90 mL/min/1 73 square meters)    Stage 2 Mild CKD (GFR = 60-89 mL/min/1 73 square meters)    Stage 3A Moderate CKD (GFR = 45-59 mL/min/1 73 square meters)    Stage 3B Moderate CKD (GFR = 30-44 mL/min/1 73 square meters)    Stage 4 Severe CKD (GFR = 15-29 mL/min/1 73 square meters)    Stage 5 End Stage CKD (GFR <15 mL/min/1 73 square meters)  Note: GFR calculation is accurate only with a steady state creatinine    CBC (With Platelets) [[id number]] Updated: 12/31/21 0634    Lab Status: No result Specimen: Blood from Arm, Right     HS Troponin I 4hr [[id number]]  (Abnormal) Collected: 12/31/21 0200    Lab Status: Final result Specimen: Blood from Arm, Left Updated: 12/31/21 0245     hs TnI 4hr 89 ng/L      Delta 4hr hsTnI 10 ng/L     HS Troponin I 2hr [[id number]]  (Abnormal) Collected: 12/30/21 2326    Lab Status: Final result Specimen: Blood from Hand, Left Updated: 12/31/21 0019     hs TnI 2hr 91 ng/L      Delta 2hr hsTnI 12 ng/L     D-Dimer [[id number]]  (Abnormal) Collected: 12/30/21 2142    Lab Status: Final result Specimen: Blood from Arm, Left Updated: 12/30/21 2224     D-Dimer, Quant 0 79 ug/ml FEU     HS Troponin 0hr (reflex protocol) [[id number]]  (Abnormal) Collected: 12/30/21 2142    Lab Status: Final result Specimen: Blood from Arm, Left Updated: 12/30/21 2215     hs TnI 0hr 79 ng/L     COVID/FLU/RSV - 2 hour TAT [[id number]]  (Abnormal) Collected: 12/30/21 2124    Lab Status: Final result Specimen: Nares from Nose Updated: 12/30/21 2213     SARS-CoV-2 Negative     INFLUENZA A PCR Positive     INFLUENZA B PCR Negative     RSV PCR Negative    Narrative:      FOR PEDIATRIC PATIENTS - copy/paste COVID Guidelines URL to browser: [email or web address]     This test has been authorized by FDA under an EUA (Emergency Use Assay) for use by authorized laboratories  Clinical caution and judgement should be used with the interpretation of these results with consideration of the clinical impression and other laboratory testing  Testing reported as "Positive" or "Negative" has been proven to be accurate according to standard laboratory validation requirements  All testing is performed with control materials showing appropriate reactivity at standard intervals      NT-BNP PRO [[id number]]  (Normal) Collected: 12/30/21 2142    Lab Status: Final result Specimen: Blood from Arm, Left Updated: 12/30/21 2212     NT-proBNP 155 pg/mL     Comprehensive metabolic panel [[id number]]  (Abnormal) Collected: 12/30/21 2142    Lab Status: Final result Specimen: Blood from Arm, Left Updated: 12/30/21 2203     Sodium 130 mmol/L      Potassium 4 6 mmol/L      Chloride 95 mmol/L      CO2 31 mmol/L      ANION GAP 4 mmol/L      BUN 17 mg/dL      Creatinine 0 98 mg/dL      Glucose 457 mg/dL      Calcium 8 6 mg/dL      AST 38 U/L      ALT 34 U/L      Alkaline Phosphatase 160 U/L      Total Protein 7 3 g/dL      Albumin 3 5 g/dL      Total Bilirubin 0 49 mg/dL      eGFR 68 ml/min/1 73sq m     Narrative:      [place] guidelines for Chronic Kidney Disease (CKD):     Stage 1 with normal or high GFR (GFR > 90 mL/min/1 73 square meters)    Stage 2 Mild CKD (GFR = 60-89 mL/min/1 73 square meters)    Stage 3A Moderate CKD (GFR = 45-59 mL/min/1 73 square meters)    Stage 3B Moderate CKD (GFR = 30-44 mL/min/1 73 square meters)    Stage 4 Severe CKD (GFR = 15-29 mL/min/1 73 square meters)    Stage 5 End Stage CKD (GFR <15 mL/min/1 73 square meters)  Note: GFR calculation is accurate only with a steady state creatinine    Phosphorus [[id number]]  (Normal) Collected: 12/30/21 2142    Lab Status: Final result Specimen: Blood from Arm, Left Updated: 12/30/21 2203     Phosphorus 4 1 mg/dL     Magnesium [[id number]]  (Normal) Collected: 12/30/21 2142    Lab Status: Final result Specimen: Blood from Arm, Left Updated: 12/30/21 2201     Magnesium 1 6 mg/dL     CBC and differential [[id number]]  (Abnormal) Collected: 12/30/21 2142    Lab Status: Final result Specimen: Blood from Arm, Left Updated: 12/30/21 2154     WBC 7 00 Thousand/uL      RBC 5 04 Million/uL      Hemoglobin 14 8 g/dL      Hematocrit 44 5 %      MCV 88 fL      MCH 29 3 pg      MCHC 33 2 g/dL      RDW 13 5 %      MPV 10 4 fL      Platelets 841 Thousands/uL      Neutrophils Relative 79 %      Lymphocytes Relative 11 %      Monocytes Relative 10 %      Eosinophils Relative 0 %      Basophils Relative 1 %      Neutrophils Absolute 5 50 Thousands/µL      Lymphocytes Absolute 0 80 Thousands/µL      Monocytes Absolute 0 70 Thousand/µL      Eosinophils Absolute 0 00 Thousand/µL      Basophils Absolute 0 00 Thousands/µL                  CTA ED chest PE study   Final Result by Nick Marquez DO (12/30 8066)      No pulmonary embolism or acute pulmonary process is seen  The heart appears enlarged  Cholelithiasis without discrete evidence of acute cholecystitis  Other findings as above  Workstation performed: [id number]         XR chest 1 view portable   Final Result by Kathleen Cates MD (12/31 2551)      No acute cardiopulmonary disease  Workstation performed: [id number]                    Procedures  Procedures         ED Course  ED Course as of 01/01/22 0856   Thu Dec 30, 2021   2028 Currently on the phone, talking  No longer crying  Oxygen ranges from 96-97% on RA    2102 Patient desaturated to high 80's when I walked in  With sitting up she was 92%  Ambulatory pulse ox on RA was 88% at the lowest  2L of 02 via nasal canula was started  2117 99% with 2L   2205 Procedure Note: EKG  Date/Time: 12/30/21 10:05 PM   Performed by: Monalisa Flores   Authorized by: Monalisa Flores  ECG interpreted by me, the ED Provider: yes   The EKG demonstrates:  Rate 68 bpm  Rhythm NSR with PACs  QTc 448 ms  No ST elevations/depressions     2207 Glucose, Random(!): 457   2207 Anion Gap(!): 4  Not elevated    2208 Sodium(!): 130   2216 hs TnI 0hr(!!): 79  Discussed with attending will get delta, if increasing will repeat ekg and consult cardiology    2216 INFLU A PCR(!): Positive   2226 D-Dimer, Quant(!): 0 79  CTA PE study ordered    2357 CT findings: No pulmonary embolism or acute pulmonary process is seen        The heart appears enlarged      Cholelithiasis without discrete evidence of acute cholecystitis      Other findings as above       Fri Dec 31, 2021   0001 SLIM messaged    0012 SLIM recommended FM admission  FM messaged      0022 Delta 2hr hsTnI: 12  Already consulting medical service per protocol  HEART score of 5   0034 Pt accepted to obs              HEART Risk Score      Most Recent Value   Heart Score Risk Calculator    History 0 Filed at: 12/31/2021 0023   ECG 0 Filed at: 12/31/2021 0023   Age 1 Filed at: 12/31/2021 0023   Risk Factors 2 Filed at: 12/31/2021 0023   Troponin 0 Filed at: 12/31/2021 0023   HEART Score 3 Filed at: 12/31/2021 0023                        SBIRT 22yo+      Most Recent Value   SBIRT (25 yo +)    In order to provide better care to our patients, we are screening all of our patients for alcohol and drug use  Would it be okay to ask you these screening questions? No Filed at: 12/30/2021 2017                    MDM  Number of Diagnoses or Management Options  Hypoxia  Influenza A  Diagnosis management comments: 69-year-old female with past medical history of DM, hypertension, hyperlipidemia, "enlarged heart" presents to ED complaining of 3 days worsening shortness of breath, fatigue, myalgias arthralgias, cough, chest pain, nausea, vomiting, fever  Patient's vitals were within normal limits upon initial evaluation  On subsequent evaluation pulse ox dropped to high 80s at rest and ambulation  Patient was placed on L of O2 nasal cannula and maintained saturation of 96%  Breath sounds were distant in all fields no adventitious lung sounds auscultated bilaterally  Benign physical exam otherwise  No meningeal signs  No abdominal pain or tenderness  No pitting edema  CBC, CMP, high sensitive troponin, magnesium, phosphorus, BNP, D-dimer, COVID/flu/RSV tests ordered  EKG also ordered  Patient was hyperglycemic without an anion gap or other evidence of DKA  She is also hyponatremic  NS IV fluids were given  Initial high sensitivity troponin of 79  Discussed with attending will require delta, no initial ischemic findings on EKG  D-dimer also elevated  CT PE study ordered  No PE found  + for Influenza A    Delta of 12, medical services consulted per guidelines as well as for requesting admission for cardiac observation and hypoxia  Pt admitted to obs  All imaging and/or lab testing discussed with patient  Patient and/or family members verbalizes understanding and agrees with plan for admission  Patient is stable for admission      Portions of the record may have been created with voice recognition software  Occasional wrong word or "sound a like" substitutions may have occurred due to the inherent limitations of voice recognition software  Read the chart carefully and recognize, using context, where substitutions have occurred  Amount and/or Complexity of Data Reviewed  Clinical lab tests: ordered and reviewed  Tests in the radiology section of CPT®: ordered and reviewed  Discuss the patient with other providers: yes (Dr Alexis Parker )        Disposition  Final diagnoses:   Influenza A   Hypoxia     Time reflects when diagnosis was documented in both MDM as applicable and the Disposition within this note     Time User Action Codes Description Comment    12/31/2021 12:12 AM Cristhian Martin [J10 1] Influenza A     12/31/2021 12:12 AM Cristhian Martin [R09 02] Hypoxia     12/31/2021  9:17 AM Renetta Jones [I42 9] Cardiomyopathy, unspecified type [facility])       ED Disposition     ED Disposition Condition Date/Time Comment    Admit Stable Fri Dec 31, 2021 12:31 AM Case was discussed with SHANE and the patient's admission status was agreed to be Admission Status: observation status to the service of Dr Magaly Quinones           Follow-up Information    None         Current Discharge Medication List      CONTINUE these medications which have NOT CHANGED    Details   amLODIPine (NORVASC) 5 mg tablet Take 5 mg by mouth daily      Insulin Degludec (TRESIBA FLEXTOUCH SC) Inject 30 Units under the skin daily      pravastatin (PRAVACHOL) 20 mg tablet Take 20 mg by mouth daily      Semaglutide (Rybelsus) 7 MG TABS Take 1 tablet by mouth in the morning      sitaGLIPtin (JANUVIA) 100 mg tablet Take 100 mg by mouth daily             No discharge procedures on file      PDMP Review     None          ED Provider  Electronically Signed by           Hina Persaud PA-C  01/01/22 7336

## 2022-01-01 VITALS
OXYGEN SATURATION: 92 % | WEIGHT: 247 LBS | RESPIRATION RATE: 19 BRPM | HEIGHT: 61 IN | DIASTOLIC BLOOD PRESSURE: 54 MMHG | BODY MASS INDEX: 46.63 KG/M2 | HEART RATE: 60 BPM | SYSTOLIC BLOOD PRESSURE: 117 MMHG | TEMPERATURE: 97.4 F

## 2022-01-01 PROBLEM — E66.01 MORBIDLY OBESE (HCC): Status: ACTIVE | Noted: 2022-01-01

## 2022-01-01 LAB
ANION GAP SERPL CALCULATED.3IONS-SCNC: 2 MMOL/L (ref 5–14)
BUN SERPL-MCNC: 21 MG/DL (ref 5–25)
CALCIUM SERPL-MCNC: 7.9 MG/DL (ref 8.4–10.2)
CHLORIDE SERPL-SCNC: 102 MMOL/L (ref 97–108)
CO2 SERPL-SCNC: 26 MMOL/L (ref 22–30)
CREAT SERPL-MCNC: 0.66 MG/DL (ref 0.6–1.2)
GFR SERPL CREATININE-BSD FRML MDRD: 105 ML/MIN/1.73SQ M
GLUCOSE P FAST SERPL-MCNC: 277 MG/DL (ref 70–99)
GLUCOSE SERPL-MCNC: 262 MG/DL (ref 65–140)
GLUCOSE SERPL-MCNC: 277 MG/DL (ref 70–99)
POTASSIUM SERPL-SCNC: 4.1 MMOL/L (ref 3.6–5)
SODIUM SERPL-SCNC: 130 MMOL/L (ref 137–147)

## 2022-01-01 PROCEDURE — 80048 BASIC METABOLIC PNL TOTAL CA: CPT | Performed by: STUDENT IN AN ORGANIZED HEALTH CARE EDUCATION/TRAINING PROGRAM

## 2022-01-01 PROCEDURE — 82948 REAGENT STRIP/BLOOD GLUCOSE: CPT

## 2022-01-01 PROCEDURE — 99217 PR OBSERVATION CARE DISCHARGE MANAGEMENT: CPT | Performed by: STUDENT IN AN ORGANIZED HEALTH CARE EDUCATION/TRAINING PROGRAM

## 2022-01-01 RX ORDER — OSELTAMIVIR PHOSPHATE 75 MG/1
75 CAPSULE ORAL 2 TIMES DAILY
Qty: 6 CAPSULE | Refills: 0 | Status: SHIPPED | OUTPATIENT
Start: 2022-01-01 | End: 2022-01-04

## 2022-01-01 RX ORDER — GUAIFENESIN/DEXTROMETHORPHAN 100-10MG/5
5 SYRUP ORAL 3 TIMES DAILY PRN
Qty: 354 ML | Refills: 0 | Status: SHIPPED | OUTPATIENT
Start: 2022-01-01

## 2022-01-01 RX ORDER — PRAVASTATIN SODIUM 20 MG
20 TABLET ORAL DAILY
Qty: 30 TABLET | Refills: 0 | Status: SHIPPED | OUTPATIENT
Start: 2022-01-01

## 2022-01-01 RX ORDER — AMLODIPINE BESYLATE 5 MG/1
5 TABLET ORAL DAILY
Qty: 30 TABLET | Refills: 0 | Status: SHIPPED | OUTPATIENT
Start: 2022-01-01

## 2022-01-01 RX ORDER — INSULIN DEGLUDEC INJECTION 100 U/ML
30 INJECTION, SOLUTION SUBCUTANEOUS DAILY
Qty: 15 ML | Refills: 0 | Status: SHIPPED | OUTPATIENT
Start: 2022-01-01

## 2022-01-01 RX ORDER — ORAL SEMAGLUTIDE 7 MG/1
1 TABLET ORAL DAILY
Qty: 30 TABLET | Refills: 0 | Status: SHIPPED | OUTPATIENT
Start: 2022-01-01

## 2022-01-01 RX ADMIN — INSULIN LISPRO 5 UNITS: 100 INJECTION, SOLUTION INTRAVENOUS; SUBCUTANEOUS at 08:39

## 2022-01-01 RX ADMIN — ACETAMINOPHEN 650 MG: 325 TABLET ORAL at 08:36

## 2022-01-01 RX ADMIN — INSULIN LISPRO 3 UNITS: 100 INJECTION, SOLUTION INTRAVENOUS; SUBCUTANEOUS at 08:39

## 2022-01-01 RX ADMIN — AMLODIPINE BESYLATE 5 MG: 5 TABLET ORAL at 08:36

## 2022-01-01 RX ADMIN — SITAGLIPTIN 100 MG: 50 TABLET, FILM COATED ORAL at 08:36

## 2022-01-01 RX ADMIN — ENOXAPARIN SODIUM 40 MG: 40 INJECTION SUBCUTANEOUS at 08:36

## 2022-01-01 RX ADMIN — OSELTAMIVIR PHOSPHATE 75 MG: 75 CAPSULE ORAL at 08:40

## 2022-01-01 NOTE — DISCHARGE SUMMARY
[facility]  Discharge- Ana Alvarez 1975, 51 y o  female MRN: [id number]  Unit/Bed#: 7T [facility] 716-01 Encounter: [id number]  Primary Care Provider: No primary care provider on file  Date and time admitted to hospital: 12/30/2021  8:09 PM    * Influenza A H1N1 infection  Assessment & Plan  · Patient with influenza symptoms and shortness breast  · Found to be 88% and was placed on nasal cannula in ED  · Continue Tamiflu  · Supportive care for influenza symptoms    Patient saturating well on room air, medically stable for discharge    Morbidly obese ([address] )  Assessment & Plan  BMI currently 46 67  -discussed diet exercise    Primary hypertension  Assessment & Plan  · Continue amlodipine    Diabetes mellitus type 2 in obese [facility])  Assessment & Plan  · Patient reports that she has ran out of her medication  · Insulin coverage while in the hospital    Will send script to pharmacy for patient's prior to hospitalization medications she currently does not have a PCP  Medical Problems             Resolved Problems  Date Reviewed: 1/1/2022    None              Discharging Physician / Practitioner: Denisse Kendall DO  PCP: No primary care provider on file  Admission Date:   Admission Orders (From admission, onward)     Ordered        12/31/21 0032  Place in Observation  Once                      Discharge Date: 01/01/22    Consultations During Hospital Stay:  · None    Procedures Performed:   · None    Significant Findings / Test Results:   · None    Incidental Findings:   · None    Test Results Pending at Discharge (will require follow up): · None     Outpatient Tests Requested:  · None    Complications:  None    Reason for Admission:  Acute hypoxic respiratory failure secondary to influenza    Hospital Course:   Ana Alvarez is a 51 y o  female patient who originally presented to the hospital on 12/30/2021 due to influenza A   Patient desaturated in ED requiring supplemental oxygenation  Patient was started on Tamiflu and given supportive care slowly titrated off oxygen  Patient will be discharged home  25 [facility] residents to patient establish care at the clinic discharge as patient currently has no PCP  Patient will be discharged home with 30 day supply of all prior hospitalization medications  Patient was informed that family medicine clinic will be reaching out to her Monday to schedule patient visit        Please see above list of diagnoses and related plan for additional information  Condition at Discharge: good    Discharge Day Visit / Exam:   Subjective:  Patient is is still has cough however feels fine  Vitals: Blood Pressure: 117/54 (01/01/22 0801)  Pulse: 60 (01/01/22 0801)  Temperature: (!) 97 4 °F (36 3 °C) (01/01/22 0801)  Temp Source: Temporal (01/01/22 0801)  Respirations: 19 (01/01/22 0801)  Height: 5' 1" (154 9 cm) (12/31/21 0900)  Weight - Scale: 112 kg (247 lb) (12/31/21 0900)  SpO2: 92 % (01/01/22 0801)  Exam:   Physical Exam  Constitutional:       Appearance: She is obese  HENT:      Head: Normocephalic  Cardiovascular:      Rate and Rhythm: Normal rate and regular rhythm  Pulses: Normal pulses  Heart sounds: Normal heart sounds  Pulmonary:      Effort: Pulmonary effort is normal  No respiratory distress  Breath sounds: Normal breath sounds  Abdominal:      General: Abdomen is flat  Bowel sounds are normal       Palpations: Abdomen is soft  Musculoskeletal:         General: Normal range of motion  Cervical back: Normal range of motion  Skin:     General: Skin is warm and dry  Neurological:      General: No focal deficit present  Mental Status: She is alert and oriented to person, place, and time  Mental status is at baseline  Psychiatric:         Mood and Affect: Mood normal          Behavior: Behavior normal          Thought Content:  Thought content normal          Judgment: Judgment normal           Discussion with Family: Patient declined call to   Discharge instructions/Information to patient and family:   See after visit summary for information provided to patient and family  Provisions for Follow-Up Care:  See after visit summary for information related to follow-up care and any pertinent home health orders  Disposition:   Home    Planned Readmission: no     Discharge Statement:  I spent 35 minutes discharging the patient  This time was spent on the day of discharge  I had direct contact with the patient on the day of discharge  Greater than 50% of the total time was spent examining patient, answering all patient questions, arranging and discussing plan of care with patient as well as directly providing post-discharge instructions  Additional time then spent on discharge activities  Discharge Medications:  See after visit summary for reconciled discharge medications provided to patient and/or family        **Please Note: This note may have been constructed using a voice recognition system**

## 2022-01-01 NOTE — NURSING NOTE
PT was D/C to home D/C instruction was given to PT and PT verbalized understanding of D/C instruction  IV was D/C  All persona belonging was given to PT   7 T staff accompany PT to [facility]

## 2022-01-01 NOTE — ASSESSMENT & PLAN NOTE
· Patient reports that she has ran out of her medication  · Insulin coverage while in the hospital    Will send script to pharmacy for patient's prior to hospitalization medications she currently does not have a PCP

## 2022-01-01 NOTE — UTILIZATION REVIEW
Continued Stay Review    Date: 1/1/22                          Current Patient Class: observation  Current Level of Care: med surg telemetry    HPI:46 y o  female initially admitted on 12/30/21     Assessment/Plan: Patient was started on Tamiflu and given supportive care slowly titrated off oxygen  Patient will be discharged home  25 [facility] residents to patient establish care at the clinic discharge as patient currently has no PCP  Patient will be discharged home with 30 day supply of all prior hospitalization medications    Patient was informed that family medicine clinic will be reaching out to her Monday to schedule patient visit    Vital Signs:   Date/Time Temp Pulse Resp BP MAP (mmHg) SpO2 Calculated FIO2 (%) - Nasal Cannula Nasal Cannula O2 Flow Rate (L/min) O2 Device Patient Position - Orthostatic VS   01/01/22 0801 97 4 °F (36 3 °C) Abnormal  60 19 117/54 63 92 % -- -- None (Room air) Lying   12/31/21 2300 97 4 °F (36 3 °C) Abnormal  59 20 152/99 -- 98 % -- -- None (Room air) Lying   12/31/21 2024 -- -- -- -- -- 100 % 24 1 L/min Nasal cannula --   12/31/21 1930 -- -- -- -- -- 99 % 24 1 L/min Nasal cannula --   12/31/21 1532 97 4 °F (36 3 °C) Abnormal  57 22 151/73 92 99 % 28 2 L/min Nasal cannula Lying   12/31/21 0900 -- 60 -- 137/77 -- -- -- -- -- --   12/31/21 0732 97 8 °F (36 6 °C) 60 20 137/77 93 97 % 28 2 L/min Nasal cannula Lying   12/31/21 0131 97 7 °F (36 5 °C) 80 17 110/57 -- 92 % 28 2 L/min Nasal cannula Lying   12/31/21 0100 -- 67 18 135/69 -- 97 % 28 2 L/min Nasal cannula Lying   12/31/21 0000 -- 67 18 137/70 -- 98 % 28 2 L/min Nasal cannula Lying   12/30/21 2330 -- 67 20 135/74 -- 99 % 28 2 L/min Nasal cannula Lying   12/30/21 2200 98 9 °F (37 2 °C) 71 20 113/53 -- 96 % 28 2 L/min Nasal cannula Lying   12/30/21 2012 99 5 °F (37 5 °C) 74 20 122/61 -- 94 % -- -- None (Room air) Lying     Pertinent Labs/Diagnostic Results:   12/30 cxr:  nad  12/30 cta chest pe study:  No pulmonary embolism or acute pulmonary process is seen  The heart appears enlarged  Cholelithiasis without discrete evidence of acute cholecystitis  12/31 echo:   Left Ventricle: Left ventricular cavity size is normal  Wall thickness is mildly increased  The left ventricular ejection fraction is 65%  Systolic function is normal  Wall motion is normal  Diastolic function is mildly abnormal, consistent with grade I (abnormal) relaxation      Results from last 7 days   Lab Units 12/30/21 2124   SARS-COV-2  Negative     Results from last 7 days   Lab Units 12/30/21  2142   WBC Thousand/uL 7 00   HEMOGLOBIN g/dL 14 8   HEMATOCRIT % 44 5   PLATELETS Thousands/uL 189   NEUTROS ABS Thousands/µL 5 50     Results from last 7 days   Lab Units 01/01/22  0446 12/31/21  0546 12/30/21  2142   SODIUM mmol/L 130* 132* 130*   POTASSIUM mmol/L 4 1 4 6 4 6   CHLORIDE mmol/L 102 107 95*   CO2 mmol/L 26 19* 31*   ANION GAP mmol/L 2* 6 4*   BUN mg/dL 21 18 17   CREATININE mg/dL 0 66 0 72 0 98   EGFR ml/min/1 73sq m 105 100 68   CALCIUM mg/dL 7 9* 8 2* 8 6   MAGNESIUM mg/dL  --   --  1 6   PHOSPHORUS mg/dL  --   --  4 1     Results from last 7 days   Lab Units 12/30/21  2142   AST U/L 38*   ALT U/L 34   ALK PHOS U/L 160*   TOTAL PROTEIN g/dL 7 3   ALBUMIN g/dL 3 5   TOTAL BILIRUBIN mg/dL 0 49     Results from last 7 days   Lab Units 01/01/22  0540 12/31/21  2022 12/31/21  1617 12/31/21  1128 12/31/21  1126 12/31/21  0544 12/31/21  0127   POC GLUCOSE mg/dl 262* 385* 355* 480* >500* 262* 414*     Results from last 7 days   Lab Units 01/01/22  0446 12/31/21  0546 12/30/21  2142   GLUCOSE RANDOM mg/dL 277* 280* 457*     Results from last 7 days   Lab Units 12/31/21  0200 12/30/21  2326 12/30/21  2142   HS TNI 0HR ng/L  --   --  79*   HS TNI 2HR ng/L  --  91*  --    HSTNI D2 ng/L  --  12  --    HS TNI 4HR ng/L 89*  --   --    HSTNI D4 ng/L 10  --   --      Results from last 7 days   Lab Units 12/30/21  1462   D-DIMER QUANTITATIVE ug/ml FEU 0 79* Results from last 7 days   Lab Units 12/30/21  2142   NT-PRO BNP pg/mL 155     Results from last 7 days   Lab Units 12/30/21  2124   INFLUENZA A PCR  Positive*   INFLUENZA B PCR  Negative   RSV PCR  Negative     Medications:   Scheduled Medications:  amLODIPine, 5 mg, Oral, Daily  enoxaparin, 40 mg, Subcutaneous, Daily  insulin detemir, 30 Units, Subcutaneous, HS  insulin lispro, 1-6 Units, Subcutaneous, TID AC  insulin lispro, 1-6 Units, Subcutaneous, HS  insulin lispro, 5 Units, Subcutaneous, TID With Meals  oseltamivir, 75 mg, Oral, Q12H ROSLYN  pravastatin, 20 mg, Oral, After Dinner  sitaGLIPtin, 100 mg, Oral, Daily      Continuous IV Infusions:  sodium chloride, 100 mL/hr, Intravenous, Continuous      PRN Meds:  acetaminophen, 650 mg, Oral, Q4H PRN x3 thus far  ondansetron, 4 mg, Intravenous, Q6H PRN      Discharge Plan: d    Network Utilization Review Department  ATTENTION: Please call with any questions or concerns to [phone] and carefully listen to the prompts so that you are directed to the right person  All voicemails are confidential   Mariana Sena all requests for admission clinical reviews, approved or denied determinations and any other requests to dedicated fax number below belonging to the campus where the patient is receiving treatment   List of dedicated fax numbers for the Facilities:  [address] [place] [address] DENIALS (Administrative/Medical Necessity) [phone]   [address] (Maternity/NICU/Pediatrics) 261 [facility],[address] [address] [place] [address] [place] [address] [facility] [address] [address]  [address] [facility] [phone] [place] [address] [phone]

## 2022-01-01 NOTE — ASSESSMENT & PLAN NOTE
· Patient with influenza symptoms and shortness breast  · Found to be 88% and was placed on nasal cannula in ED  · Continue Tamiflu  · Supportive care for influenza symptoms    Patient saturating well on room air, medically stable for discharge

## 2022-01-02 NOTE — ED ATTENDING ATTESTATION
I was the attending physician on duty at the time the patient visited the emergency department  The patient was evaluated and dispositioned by the APC  I was personally available for consultation  I am administratively signing the chart after the fact      Marianne Morgan MD

## 2022-03-29 ENCOUNTER — OFFICE VISIT (OUTPATIENT)
Dept: URGENT CARE | Age: 47
End: 2022-03-29
Payer: MEDICARE

## 2022-03-29 VITALS
OXYGEN SATURATION: 96 % | RESPIRATION RATE: 18 BRPM | SYSTOLIC BLOOD PRESSURE: 190 MMHG | WEIGHT: 239 LBS | BODY MASS INDEX: 46.92 KG/M2 | TEMPERATURE: 97.2 F | HEIGHT: 60 IN | DIASTOLIC BLOOD PRESSURE: 82 MMHG | HEART RATE: 62 BPM

## 2022-03-29 DIAGNOSIS — L02.413 ABSCESS OF ARM, RIGHT: Primary | ICD-10-CM

## 2022-03-29 PROCEDURE — 99213 OFFICE O/P EST LOW 20 MIN: CPT

## 2022-03-29 RX ORDER — SULFAMETHOXAZOLE AND TRIMETHOPRIM 800; 160 MG/1; MG/1
1 TABLET ORAL EVERY 12 HOURS SCHEDULED
Qty: 14 TABLET | Refills: 0 | Status: SHIPPED | OUTPATIENT
Start: 2022-03-29 | End: 2022-04-05

## 2022-03-29 NOTE — PATIENT INSTRUCTIONS
Abscess     You have an abscess to your right forearm  Take the antibiotic as directed  Follow up with your PCP in 3-5 days  If symptoms get worse, go to the ER  AMBULATORY CARE:   An abscess is an area under the skin where pus (infected fluid) collects  An abscess is often caused by bacteria, fungi or other germs that get into an open wound  You can get an abscess anywhere on your body  Common signs and symptoms of an abscess: You may have a swollen mass that is red and painful  Pus may leak out of the mass  The pus will be white or yellow and may smell bad  You may have redness and pain days before the mass appears  You may have a fever and chills if the infection spreads  Seek immediate care if:   · The area around your abscess becomes very painful, warm, or has red streaks  · You have a fever and chills  · Your heart is beating faster than usual     · You feel faint or confused  Call your doctor if:   · Your abscess gets bigger or does not get better  · Your abscess returns  · You have questions or concerns about your condition or care  Treatment for an abscess: Your healthcare provider may need to make a cut in the abscess to allow the pus to drain  You may need surgery to remove your abscess  You may  need any of the following:  · Antibiotics  help treat a bacterial infection  · Acetaminophen  decreases pain and fever  It is available without a doctor's order  Ask how much to take and how often to take it  Follow directions  Read the labels of all other medicines you are using to see if they also contain acetaminophen, or ask your doctor or pharmacist  Acetaminophen can cause liver damage if not taken correctly  Do not use more than 4 grams (4,000 milligrams) total of acetaminophen in one day  · NSAIDs , such as ibuprofen, help decrease swelling, pain, and fever  This medicine is available with or without a doctor's order   NSAIDs can cause stomach bleeding or kidney problems in certain people  If you take blood thinner medicine, always ask your healthcare provider if NSAIDs are safe for you  Always read the medicine label and follow directions  · Take your medicine as directed  Contact your healthcare provider if you think your medicine is not helping or if you have side effects  Tell him or her if you are allergic to any medicine  Keep a list of the medicines, vitamins, and herbs you take  Include the amounts, and when and why you take them  Bring the list or the pill bottles to follow-up visits  Carry your medicine list with you in case of an emergency  Self-care:   · Apply a warm compress to your abscess  This will help it open and drain  Wet a washcloth in warm, but not hot, water  Apply the compress for 10 minutes  Repeat this 4 times each day  Do not  press on an abscess or try to open it with a needle  You may push the bacteria deeper or into your blood  · Do not share your clothes, towels, or sheets with anyone  This can spread the infection to others  · Wash your hands often  This can help prevent the spread of germs  Use soap and water or an alcohol-based hand rub  Care for your wound after it is drained:   · Care for your wound as directed  If your healthcare provider says it is okay, carefully remove the bandage and gauze packing  You may need to soak the gauze to get it out of your wound  Clean your wound and the area around it as directed  Dry the area and put on new, clean bandages  Change your bandages when they get wet or dirty  · Ask your healthcare provider how to change the gauze in your wound  Keep track of how many pieces of gauze are placed inside the wound  Do not put too much packing in the wound  Do not pack the gauze too tightly in your wound  Follow up with your healthcare provider in 1 to 3 days: You may need to have your packing removed or your bandage changed   Write down your questions so you remember to ask them during your visits  © Copyright [employer] 2022 Information is for End User's use only and may not be sold, redistributed or otherwise used for commercial purposes  All illustrations and images included in CareNotes® are the copyrighted property of A D A M , Inc  or Avery Fleming  The above information is an  only  It is not intended as medical advice for individual conditions or treatments  Talk to your doctor, nurse or pharmacist before following any medical regimen to see if it is safe and effective for you

## 2022-03-29 NOTE — PROGRESS NOTES
[facility]        NAME: Carlotta Emery is a 51 y o  female  : 1975    MRN: [id number]  DATE: 2022  TIME: 10:19 AM    Assessment and Plan   Abscess of arm, right [L02 [id number]]  1  Abscess of arm, right  sulfamethoxazole-trimethoprim (BACTRIM DS) 800-160 mg per tablet         Patient Instructions     Antibiotic as directed  Advised patient to follow-up with PCP regarding HTN and DM management  Follow up with PCP in 3-5 days  Proceed to ER if symptoms worsen  Chief Complaint     Chief Complaint   Patient presents with    Abscess     pt reports abscess on right forearm that staretd yesterday, increased greatly in size today         History of Present Illness     51 y o  F presents with complaint of abscess on R forearm starting yesterday  Denies fevers or chills  Denies bite or exposure outside  +DM  Hx of HTN with prescribed Norvasc, patient not taking  Also states she is not taking her Januvia or [employer]  States wound is tender and painful  Denies numbness or tingling  Review of Systems   Review of Systems   Constitutional: Negative for chills, fatigue and fever  HENT: Negative for congestion, ear discharge, ear pain, facial swelling, rhinorrhea, sinus pain, sore throat and trouble swallowing  Eyes: Negative for photophobia and visual disturbance  Respiratory: Negative for cough, chest tightness, shortness of breath and wheezing  Cardiovascular: Negative for chest pain, palpitations and leg swelling  Gastrointestinal: Negative for abdominal pain, diarrhea, nausea and vomiting  Genitourinary: Negative for dysuria, flank pain and hematuria  Musculoskeletal: Negative for arthralgias, back pain and myalgias  Skin: Positive for wound  Negative for pallor  Neurological: Negative for dizziness, seizures, weakness, numbness and headaches  Psychiatric/Behavioral: Negative for sleep disturbance           Current Medications       Current Outpatient Medications:   amLODIPine (NORVASC) 5 mg tablet, Take 1 tablet (5 mg total) by mouth daily (Patient not taking: Reported on 3/29/2022 ), Disp: 30 tablet, Rfl: 0    dextromethorphan-guaiFENesin (ROBITUSSIN DM)  mg/5 mL syrup, Take 5 mL by mouth 3 (three) times a day as needed for cough (Patient not taking: Reported on 3/29/2022 ), Disp: 354 mL, Rfl: 0    insulin degludec [place] FlexTouch) 100 units/mL injection pen, Inject 30 Units under the skin daily (Patient not taking: Reported on 3/29/2022 ), Disp: 15 mL, Rfl: 0    pravastatin (PRAVACHOL) 20 mg tablet, Take 1 tablet (20 mg total) by mouth daily (Patient not taking: Reported on 3/29/2022 ), Disp: 30 tablet, Rfl: 0    Semaglutide (Rybelsus) 7 MG TABS, Take 1 tablet by mouth in the morning (Patient not taking: Reported on 3/29/2022 ), Disp: 30 tablet, Rfl: 0    sitaGLIPtin (JANUVIA) 100 mg tablet, Take 1 tablet (100 mg total) by mouth daily (Patient not taking: Reported on 3/29/2022 ), Disp: 30 tablet, Rfl: 0    sulfamethoxazole-trimethoprim (BACTRIM DS) 800-160 mg per tablet, Take 1 tablet by mouth every 12 (twelve) hours for 7 days, Disp: 14 tablet, Rfl: 0    Current Allergies     Allergies as of 2022 - Reviewed 2022   Allergen Reaction Noted    Asa [aspirin] Other (See Comments) 2021            The following portions of the patient's history were reviewed and updated as appropriate: allergies, current medications, past family history, past medical history, past social history, past surgical history and problem list      Past Medical History:   Diagnosis Date    Diabetes mellitus ([address] )     Hypertension        Past Surgical History:   Procedure Laterality Date     SECTION      FINGER SURGERY         Family History   Problem Relation Age of Onset    Hypertension Mother     Diabetes Mother     Hypertension Father     Diabetes Father          Medications have been verified          Objective   BP (!) 190/82 (BP Location: Left arm, Patient Position: Sitting) Comment: manual  Pulse 62   Temp (!) 97 2 °F (36 2 °C)   Resp 18   Ht 5' (1 524 m)   Wt 108 kg (239 lb)   SpO2 96%   BMI 46 68 kg/m²   No LMP recorded  Patient is postmenopausal        Physical Exam     Physical Exam  Vitals reviewed  Constitutional:       General: She is not in acute distress  Appearance: Normal appearance  She is obese  She is not toxic-appearing  HENT:      Head: Normocephalic  Right Ear: Tympanic membrane normal       Left Ear: Tympanic membrane normal       Nose: No congestion or rhinorrhea  Right Sinus: No maxillary sinus tenderness or frontal sinus tenderness  Left Sinus: No maxillary sinus tenderness or frontal sinus tenderness  Mouth/Throat:      Pharynx: No oropharyngeal exudate or posterior oropharyngeal erythema  Tonsils: No tonsillar exudate  Eyes:      Pupils: Pupils are equal, round, and reactive to light  Cardiovascular:      Rate and Rhythm: Normal rate and regular rhythm  Pulses: Normal pulses  Heart sounds: Normal heart sounds  Pulmonary:      Effort: Pulmonary effort is normal       Breath sounds: Normal breath sounds  No wheezing  Abdominal:      General: Bowel sounds are normal       Palpations: Abdomen is soft  Musculoskeletal:         General: Normal range of motion  Cervical back: Normal range of motion  Lymphadenopathy:      Cervical: No cervical adenopathy  Skin:     General: Skin is warm and dry  Findings: Abscess present  Comments: +edema, erythema and warmth   Neurological:      General: No focal deficit present  Mental Status: She is alert  Cranial Nerves: Cranial nerves are intact  No cranial nerve deficit or facial asymmetry  Sensory: Sensation is intact  Motor: Motor function is intact  Coordination: Coordination is intact  Gait: Gait is intact  Deep Tendon Reflexes: Reflexes are normal and symmetric

## 2022-04-01 ENCOUNTER — OFFICE VISIT (OUTPATIENT)
Dept: URGENT CARE | Age: 47
End: 2022-04-01
Payer: MEDICARE

## 2022-04-01 VITALS
RESPIRATION RATE: 18 BRPM | BODY MASS INDEX: 46.92 KG/M2 | TEMPERATURE: 97.9 F | HEART RATE: 80 BPM | OXYGEN SATURATION: 99 % | HEIGHT: 60 IN | WEIGHT: 239 LBS | DIASTOLIC BLOOD PRESSURE: 69 MMHG | SYSTOLIC BLOOD PRESSURE: 147 MMHG

## 2022-04-01 DIAGNOSIS — L02.413 ABSCESS OF RIGHT FOREARM: Primary | ICD-10-CM

## 2022-04-01 PROCEDURE — 90471 IMMUNIZATION ADMIN: CPT | Performed by: PHYSICIAN ASSISTANT

## 2022-04-01 PROCEDURE — 87070 CULTURE OTHR SPECIMN AEROBIC: CPT | Performed by: PHYSICIAN ASSISTANT

## 2022-04-01 PROCEDURE — 99213 OFFICE O/P EST LOW 20 MIN: CPT | Performed by: PHYSICIAN ASSISTANT

## 2022-04-01 PROCEDURE — 87205 SMEAR GRAM STAIN: CPT | Performed by: PHYSICIAN ASSISTANT

## 2022-04-01 PROCEDURE — 90715 TDAP VACCINE 7 YRS/> IM: CPT

## 2022-04-01 PROCEDURE — 87186 SC STD MICRODIL/AGAR DIL: CPT | Performed by: PHYSICIAN ASSISTANT

## 2022-04-01 PROCEDURE — 10060 I&D ABSCESS SIMPLE/SINGLE: CPT | Performed by: PHYSICIAN ASSISTANT

## 2022-04-01 RX ORDER — IBUPROFEN 400 MG/1
800 TABLET ORAL ONCE
Status: COMPLETED | OUTPATIENT
Start: 2022-04-01 | End: 2022-04-01

## 2022-04-01 RX ORDER — IBUPROFEN 800 MG/1
800 TABLET ORAL ONCE
Qty: 30 TABLET | Refills: 0 | Status: SHIPPED | OUTPATIENT
Start: 2022-04-01 | End: 2022-04-01 | Stop reason: CLARIF

## 2022-04-01 RX ADMIN — IBUPROFEN 800 MG: 400 TABLET ORAL at 11:32

## 2022-04-01 NOTE — LETTER
April 1, 2022     Patient: Thom Boothe   YOB: 1975   Date of Visit: 4/1/2022       To Whom It May Concern: It is my medical opinion that Rolo Conte may return to work on 04/02/2022  She must keep her wound clean covered and dry       If you have any questions or concerns, please don't hesitate to call           Sincerely,        Fco Jules PA-C    CC: No Recipients

## 2022-04-01 NOTE — PROGRESS NOTES
[facility]        NAME: Elsi Marsh is a 51 y o  female  : 1975    MRN: [id number]  DATE: 2022  TIME: 11:42 AM    Assessment and Plan   Abscess of right forearm [L02 [id number]]  1  Abscess of right forearm  Tdap Vaccine greater than or equal to 8yo    ibuprofen (MOTRIN) tablet 800 mg    Wound culture and Gram stain    DISCONTINUED: ibuprofen (MOTRIN) 800 mg tablet   Abscess of right forearm  Discussed I&D, procedure performed as outlined below  Tdap updated and Ibuprofen provided for pain  Pt will continue antibiotic prescription  She will follow-up with her PCP or return in 3 days for packing removal  Instructed if symptoms worsen to report directly to the emergency department  Patient Instructions     Patient Instructions     Take prescribed antibiotics as directed  Finish the entire course  Take over the counter Ibuprofen or Tylenol as needed for pain  Do not take ibuprofen if you are on blood thinning medications or have a history of peptic ulcer disease or kidney disease  Do not take Tylenol if you have a history of liver disease  If your symptoms are not improving in 2-3 days, follow-up with your primary care provider  If your symptoms worsen, redness around the wound spreads, you develop red streaking from the wound, or you develop fever or chills report to the emergency department  Follow-up in 3 days for packing removal    Absceso   CUIDADO AMBULATORIO:   Un absceso es akshat área debajo de la piel en donde se acumula pus (líquido infectado)  Un absceso es a menudo causado por bacterias, hongos u otros gérmenes que entran en akshat herida [place]  Usted puede tener un absceso en cualquier parte de alfonso cuerpo  Signos y síntomas comunes de un absceso: Usted podría tener un absceso inflamado, olea y doloroso  El pus se puede filtrar fuera de la masa   El pus será mccollum o amarillo y puede oler mal  Es probable que usted tenga enrojecimiento y dolor días antes de que aparezca la masa  Si la infección se disemina, puede tener fiebre y escalofríos  Busque atención inmediata si:  · El área alrededor del absceso se pone muy dolorosa, caliente o tiene manchas cullen  · Usted tiene fiebre y [address]  · Alfonso corazón está latiendo más rápido de lo normal     · Se siente desmayar o confundido  Llame a alfonso médico si:  · Alfonso absceso se hace más kaylyn o no mejora  · El absceso se vuelve a formar  · Usted tiene preguntas o inquietudes acerca de alfonso condición o cuidado  Tratamiento para un absceso: Alfonso médico puede necesitar realizar un merline en el absceso para permitir el drenaje del pus  Es posible que usted necesite cirugía para extraer el absceso  Es posible que usted necesite alguno de los siguientes:  · Los antibióticos ayudan a tratar akshat infección bacteriana  · Acetaminofén sang el dolor y baja la fiebre  Está disponible sin receta médica  Pregunte la cantidad y la frecuencia con que debe tomarlos  [address]  Kavita las etiquetas de todos los demás medicamentos que esté usando para saber si también contienen acetaminofén, o pregunte a alfonso médico o farmacéutico  El acetaminofén puede causar daño en el hígado cuando no se luis miguel de forma correcta  No use más de 4 gramos (4000 miligramos) en total de acetaminofeno en un día  · Los [place], antolin el ibuprofeno, [facility] a disminuir la inflamación, el dolor y la [place] medicamento está disponible con o sin akshat receta médica  Los LEXI pueden causar sangrado estomacal o problemas renales en ciertas personas  Si usted luis miguel un medicamento anticoagulante, siempre pregúntele a alfonso médico si los LEXI son seguros para usted  Siempre kavita la etiqueta de [place] medicamento y [place] instrucciones  · [place] marisabel medicamentos antolin se le haya indicado  Consulte con alfonso médico si usted curry que alfonso medicamento no le está ayudando o si presenta efectos secundarios  Infórmele si es alérgico a cualquier medicamento   [place] lista actualizada de [employer], las vitaminas y los productos herbales que luis miguel  Incluya los siguientes datos de los medicamentos: cantidad, frecuencia y motivo de administración  Traiga con usted la lista o los envases de las píldoras a marisabel citas de seguimiento  Lleve la lista de los medicamentos con usted en cornelius de akshat emergencia  Cuidados personales:  · Aplique akshat compresa tibia en el absceso  [place] ayudará a que el absceso se lauren y drene  Moje akshat toallita en agua tibia severo no caliente  Aplicar la compresa nishant 10 minutos  Mary esto 4 veces al día  No  presione el absceso ni trate de abrirlo con [place]  Puede empujar las bacterias de manera más profunda hacia la [place]  · No compartir con nadie alfonso ropa, toallas o sábanas  [place] puede propagar la infección a otros  · Lávese las yasemin frecuentemente  [place] ayudará a prevenir la propagación de gérmenes  Use jabón y agua o un ungüento con base de alcohol  Cuidar la herida después del drenaje:  · Siga las instrucciones de alfonso médico sobre el cuidado de marisabel heridas  Si alfonso médico dice que [employer], retire cuidadosamente el vendaje y la gasa  Puede que necesite empapar la gasa para poder sacarla de la herida  Limpié la herida y [place] a alfonso alrededor según indicaciones  Seque el área y póngase akshat venda nueva y limpia  [place]amb[place] marisabel vendajes cuando se mojen o ensucien  · Pregúntele a alfonso médico cómo cambiarse la gasa en alfonso herida  Cuente el número de apósitos de gasa que se colocan dentro de alfonso herida  No ponga demasiada gasa en la herida  No aprete demasiado la herida con la gasa  Acuda dentro de 1 o 3 días a la consulta de control con alfonso médico: Es probable que usted necesite que le remuevan marisabel compresas o le cambien las vendas  Anote marisabel preguntas para que se acuerde de hacerlas nishant marisabel visitas    © Copyright [employer] 2022 Information is for End User's use only and may not be sold, redistributed or otherwise used for commercial purposes  All illustrations and images included in CareNotes® are the copyrighted property of [employer]  or [address] [place] [address] es sólo para uso en educación  Alfonso intención no es darle un consejo médico sobre enfermedades o tratamientos  Colsulte con alfonso [place] farmacéutico antes de seguir cualquier régimen médico para saber si es seguro y efectivo para usted  Follow up with PCP in 3-5 days  Proceed to  ER if symptoms worsen  Chief Complaint     Chief Complaint   Patient presents with    Mass     right forearm was treated here Monday and was placed on antibiotics with no improvement  Now is redness and swelling and painful  History of Present Illness       Pt presents with abscess of the right forearm  She was seen for this on Monday and started on oral antibiotics and antibiotic ointment  Symptoms have not improved  She is having a lot of pressure and pain  Pt denies fever and chills  No other concerns or complaints today  Review of Systems   Review of Systems   Constitutional: Negative for chills and fever  Skin: Positive for rash and wound           Current Medications       Current Outpatient Medications:     sulfamethoxazole-trimethoprim (BACTRIM DS) 800-160 mg per tablet, Take 1 tablet by mouth every 12 (twelve) hours for 7 days, Disp: 14 tablet, Rfl: 0    amLODIPine (NORVASC) 5 mg tablet, Take 1 tablet (5 mg total) by mouth daily (Patient not taking: Reported on 3/29/2022 ), Disp: 30 tablet, Rfl: 0    dextromethorphan-guaiFENesin (ROBITUSSIN DM)  mg/5 mL syrup, Take 5 mL by mouth 3 (three) times a day as needed for cough (Patient not taking: Reported on 3/29/2022 ), Disp: 354 mL, Rfl: 0    insulin degludec Reather Magyar FlexTouch) 100 units/mL injection pen, Inject 30 Units under the skin daily (Patient not taking: Reported on 3/29/2022 ), Disp: 15 mL, Rfl: 0    pravastatin (PRAVACHOL) 20 mg tablet, Take 1 tablet (20 mg total) by mouth daily (Patient not taking: Reported on 3/29/2022 ), Disp: 30 tablet, Rfl: 0    Semaglutide (Rybelsus) 7 MG TABS, Take 1 tablet by mouth in the morning (Patient not taking: Reported on 3/29/2022 ), Disp: 30 tablet, Rfl: 0    sitaGLIPtin (JANUVIA) 100 mg tablet, Take 1 tablet (100 mg total) by mouth daily (Patient not taking: Reported on 3/29/2022 ), Disp: 30 tablet, Rfl: 0  No current facility-administered medications for this visit  Current Allergies     Allergies as of 2022 - Reviewed 2022   Allergen Reaction Noted    Asa [aspirin] Other (See Comments) 2021            The following portions of the patient's history were reviewed and updated as appropriate: allergies, current medications, past family history, past medical history, past social history, past surgical history and problem list      Past Medical History:   Diagnosis Date    Diabetes mellitus ([facility] [address] )     Hypertension        Past Surgical History:   Procedure Laterality Date     SECTION      FINGER SURGERY         Family History   Problem Relation Age of Onset    Hypertension Mother     Diabetes Mother     Hypertension Father     Diabetes Father          Medications have been verified  Objective   /69 (BP Location: Left arm, Patient Position: Sitting)   Pulse 80   Temp 97 9 °F (36 6 °C) (Tympanic)   Resp 18   Ht 5' (1 524 m)   Wt 108 kg (239 lb)   SpO2 99%   BMI 46 68 kg/m²   No LMP recorded  Patient is postmenopausal        Physical Exam     Physical Exam  Vitals and nursing note reviewed  Constitutional:       General: She is awake  She is not in acute distress  Appearance: Normal appearance  She is well-developed and well-groomed  She is not ill-appearing, toxic-appearing or diaphoretic  HENT:      Head: Normocephalic and atraumatic        Right Ear: Hearing and external ear normal       Left Ear: Hearing and external ear normal    Eyes:      General: Lids are normal  Vision grossly intact  Gaze aligned appropriately  Cardiovascular:      Rate and Rhythm: Normal rate  Pulmonary:      Effort: Pulmonary effort is normal       Comments: Patient is speaking in full sentences with no increased respiratory effort  No audible wheezing or stridor  Musculoskeletal:      Cervical back: Normal range of motion  Skin:     General: Skin is warm and dry  Comments: Superficial abscess with dominguez to the right forearm  Surrounding erythema and exquisite tenderness  Neurological:      Mental Status: She is alert and oriented to person, place, and time  Coordination: Coordination is intact  Gait: Gait is intact  Psychiatric:         Attention and Perception: Attention and perception normal          Mood and Affect: Mood and affect normal          Speech: Speech normal          Behavior: Behavior normal  Behavior is cooperative  Incision and drain    Date/Time: 4/1/2022 11:39 AM  Performed by: Irais Jackson PA-C  Authorized by: Irais Jakcson PA-C   Universal Protocol:  Consent: Verbal consent obtained  Written consent not obtained  Risks and benefits: risks, benefits and alternatives were discussed  Consent given by: patient  Patient understanding: patient states understanding of the procedure being performed  Patient identity confirmed: verbally with patient      Patient location:  Clinic  Location:     Type:  Abscess    Location:  Upper extremity    Upper extremity location:  R hand  Pre-procedure details:     Skin preparation:  Betadine  Anesthesia (see MAR for exact dosages):      Anesthesia method:  Topical application and local infiltration    Local anesthetic:  Lidocaine 1% WITH epi (2 cc)  Procedure details:     Complexity:  Simple    Needle aspiration: no      Incision types:  Elliptical    Scalpel blade:  10    Approach:  Open    Incision depth:  Subcutaneous    Wound management:  Probed and deloculated and irrigated with saline    Drainage:  Bloody and purulent    Drainage amount:  Scant    Wound treatment:  Wound left open and packing placed    Packing materials:  1/4 in iodoform gauze    Amount 1/4" iodoform:  0 5 cm   Post-procedure details:     Patient tolerance of procedure: Tolerated with difficulty  Comments:      Pt with pain throoughout the procedure  Asked several times if she would like to abort, but patient request procedure to be completed  Note: Portions of this record may have been created with voice recognition software  Occasional wrong word or "sound a like" substitutions may have occurred due to the inherent limitations of voice recognition software  Please read the chart carefully and recognize, using context, where substitutions have occurred  *

## 2022-04-01 NOTE — PATIENT INSTRUCTIONS
Take prescribed antibiotics as directed  Finish the entire course  Take over the counter Ibuprofen or Tylenol as needed for pain  Do not take ibuprofen if you are on blood thinning medications or have a history of peptic ulcer disease or kidney disease  Do not take Tylenol if you have a history of liver disease  If your symptoms are not improving in 2-3 days, follow-up with your primary care provider  If your symptoms worsen, redness around the wound spreads, you develop red streaking from the wound, or you develop fever or chills report to the emergency department  Follow-up in 3 days for packing removal    Absceso   CUIDADO AMBULATORIO:   Un absceso es akshat área debajo de la piel en donde se acumula pus (líquido infectado)  Un absceso es a menudo causado por bacterias, hongos u otros gérmenes que entran en akshat herida [place]  Usted puede tener un absceso en cualquier parte de gilman cuerpo  Signos y síntomas comunes de un absceso: Usted podría tener un absceso inflamado, olea y doloroso  El pus se puede filtrar fuera de la masa  El pus será mccollum o amarillo y puede oler mal  Es probable que usted tenga enrojecimiento y dolor [place] antes de que aparezca la masa  Si la infección se disemina, puede tener fiebre y escalofríos  Busque atención inmediata si:  · El área alrededor del absceso se pone muy dolorosa, caliente o tiene manchas cullen  · Usted tiene fiebre y [address]  · [place] corazón está latiendo más rápido de lo normal     · Se siente desmayar o confundido  Llame a gilman médico si:  · [place] absceso se hace más kaylyn o no mejora  · El absceso se vuelve a formar  · Usted tiene preguntas o inquietudes acerca de gilman condición o cuidado  Tratamiento para un absceso: [place] médico puede necesitar realizar un merline en el absceso para permitir el drenaje del pus  Es posible que usted necesite cirugía para extraer el absceso   Es posible que usted necesite alguno de los siguientes:  · Los antibióticos ayudan a tratar [employer]  · Acetaminofén sang el dolor y baja la fiebre  Está disponible sin receta médica  Pregunte la cantidad y la frecuencia con que debe tomarlos  [address]  Kavita las etiquetas de todos los demás medicamentos que esté usando para saber si también contienen acetaminofén, o pregunte a alfonso médico o farmacéutico  El acetaminofén puede causar daño en el hígado cuando no se luis miguel de forma correcta  No use más de 4 gramos (4000 miligramos) en total de acetaminofeno en un día  · Los [place], antolin el ibuprofeno, [facility] a disminuir la inflamación, el dolor y la [place]  [place] medicamento está disponible con o sin akshat receta médica  Los LEXI pueden causar sangrado estomacal o problemas renales en ciertas personas  Si usted luis miguel un medicamento anticoagulante, siempre pregúntele a alfonso médico si los LEXI son seguros para usted  Siempre kavita la etiqueta de [place] medicamento y [place] instrucciones  · [place] marisabel medicamentos antolin se le haya indicado  Consulte con alfonso médico si usted curry que alfonso medicamento no le está ayudando o si presenta efectos secundarios  Infórmele si es alérgico a cualquier medicamento  [place] akshat lista actualizada de los [place], las vitaminas y los productos herbales que luis miguel  Incluya los siguientes datos de los medicamentos: cantidad, frecuencia y motivo de administración  Traiga con usted la lista o los envases de las píldoras a marisabel citas de seguimiento  Lleve la lista de los medicamentos con usted en cornelius de akshat emergencia  Cuidados personales:  · Aplique akshat compresa tibia en el absceso  [place] ayudará a que el absceso se lauren y drene  Moje akshat toallita en agua tibia severo no caliente  Aplicar la compresa nishant 10 minutos  [place] esto 4 veces al día  No  presione el absceso ni trate de abrirlo con [place]  Puede empujar las bacterias de manera más profunda hacia la [place]  · No compartir con nadie alfonso ropa, toallas o sában[place]   [place] puede propagar la infección a otros  · Lávese las yasemin frecuentemente  [place] ayudará a prevenir la propagación de gérmenes  Use jabón y agua o un ungüento con base de alcohol  Cuidar la herida después del drenaje:  · Siga las instrucciones de alfonso médico sobre el cuidado de marisabel heridas  Si alfonso médico dice que [employer], retire cuidadosamente el vendaje y la gasa  Puede que necesite empapar la gasa para poder sacarla de la herida  Limpié la herida y [place] a alfonso alrededor según indicaciones  Seque el área y póngase akshat venda nueva y limpia  [place] marisabel vendajes cuando se mojen o ensucien  · Pregúntele a alfnoso médico cómo cambiarse la gasa en alfonso herida  Cuente el número de apósitos de gasa que se colocan dentro de alfonso herida  No ponga demasiada gasa en la herida  No aprete demasiado la herida con la gasa  Acuda dentro de 1 o 3 días a la consulta de control con alfonso médico: Es probable que usted necesite que le remuevan marisabel compresas o le cambien las vendas  An[place] marisabel preguntas para que se acuerde de hacerlas nishant marisabel visitas  © Copyright [employer] 2022 Information is for End User's use only and may not be sold, redistributed or otherwise used for commercial purposes  All illustrations and images included in CareNotes® are the copyrighted property of [employer]  or [address] [place] [address] es sólo para uso en educación  Alfonso intención no es darle un consejo médico sobre enfermedades o tratamientos  Colsulte con alfonso [place] farmacéutico antes de seguir cualquier régimen médico para saber si es seguro y efectivo para usted

## 2022-04-03 LAB
BACTERIA WND AEROBE CULT: ABNORMAL
GRAM STN SPEC: ABNORMAL
GRAM STN SPEC: ABNORMAL

## 2022-10-12 PROBLEM — J10.1 INFLUENZA A H1N1 INFECTION: Status: RESOLVED | Noted: 2021-12-31 | Resolved: 2022-10-12

## 2024-01-10 ENCOUNTER — APPOINTMENT (EMERGENCY)
Dept: CT IMAGING | Facility: HOSPITAL | Age: 49
End: 2024-01-10
Payer: MEDICARE

## 2024-01-10 ENCOUNTER — HOSPITAL ENCOUNTER (EMERGENCY)
Facility: HOSPITAL | Age: 49
Discharge: HOME | End: 2024-01-10
Attending: EMERGENCY MEDICINE
Payer: MEDICARE

## 2024-01-10 VITALS
DIASTOLIC BLOOD PRESSURE: 81 MMHG | WEIGHT: 196.21 LBS | OXYGEN SATURATION: 98 % | RESPIRATION RATE: 18 BRPM | TEMPERATURE: 98.3 F | HEART RATE: 66 BPM | SYSTOLIC BLOOD PRESSURE: 159 MMHG | BODY MASS INDEX: 38.32 KG/M2

## 2024-01-10 DIAGNOSIS — R19.7 NAUSEA VOMITING AND DIARRHEA: Primary | ICD-10-CM

## 2024-01-10 DIAGNOSIS — R10.13 EPIGASTRIC ABDOMINAL PAIN: ICD-10-CM

## 2024-01-10 DIAGNOSIS — R11.2 NAUSEA VOMITING AND DIARRHEA: Primary | ICD-10-CM

## 2024-01-10 DIAGNOSIS — R42 DIZZINESS: ICD-10-CM

## 2024-01-10 DIAGNOSIS — K80.20 GALLSTONE: ICD-10-CM

## 2024-01-10 LAB
ALBUMIN SERPL BCP-MCNC: 3.9 G/DL (ref 3.5–5)
ALP SERPL-CCNC: 63 U/L (ref 34–104)
ALT SERPL W P-5'-P-CCNC: 14 U/L (ref 7–52)
ANION GAP SERPL CALCULATED.3IONS-SCNC: 7 MMOL/L
AST SERPL W P-5'-P-CCNC: 25 U/L (ref 13–39)
BASOPHILS # BLD AUTO: 0.02 THOUSANDS/ÂΜL (ref 0–0.1)
BASOPHILS NFR BLD AUTO: 0 % (ref 0–1)
BILIRUB SERPL-MCNC: 0.47 MG/DL (ref 0.2–1)
BUN SERPL-MCNC: 33 MG/DL (ref 5–25)
CALCIUM SERPL-MCNC: 9 MG/DL (ref 8.4–10.2)
CARDIAC TROPONIN I PNL SERPL HS: 4 NG/L
CHLORIDE SERPL-SCNC: 107 MMOL/L (ref 96–108)
CO2 SERPL-SCNC: 22 MMOL/L (ref 21–32)
CREAT SERPL-MCNC: 1.3 MG/DL (ref 0.6–1.3)
EOSINOPHIL # BLD AUTO: 0.02 THOUSAND/ÂΜL (ref 0–0.61)
EOSINOPHIL NFR BLD AUTO: 0 % (ref 0–6)
ERYTHROCYTE [DISTWIDTH] IN BLOOD BY AUTOMATED COUNT: 12.8 % (ref 11.6–15.1)
GFR SERPL CREATININE-BSD FRML MDRD: 48 ML/MIN/1.73SQ M
GLUCOSE SERPL-MCNC: 140 MG/DL (ref 65–140)
HCT VFR BLD AUTO: 44.3 % (ref 34.8–46.1)
HGB BLD-MCNC: 14 G/DL (ref 11.5–15.4)
IMM GRANULOCYTES # BLD AUTO: 0.02 THOUSAND/UL (ref 0–0.2)
IMM GRANULOCYTES NFR BLD AUTO: 0 % (ref 0–2)
LIPASE SERPL-CCNC: 11 U/L (ref 11–82)
LYMPHOCYTES # BLD AUTO: 2.01 THOUSANDS/ÂΜL (ref 0.6–4.47)
LYMPHOCYTES NFR BLD AUTO: 34 % (ref 14–44)
MAGNESIUM SERPL-MCNC: 1.8 MG/DL (ref 1.9–2.7)
MCH RBC QN AUTO: 29.6 PG (ref 26.8–34.3)
MCHC RBC AUTO-ENTMCNC: 31.6 G/DL (ref 31.4–37.4)
MCV RBC AUTO: 94 FL (ref 82–98)
MONOCYTES # BLD AUTO: 0.28 THOUSAND/ÂΜL (ref 0.17–1.22)
MONOCYTES NFR BLD AUTO: 5 % (ref 4–12)
NEUTROPHILS # BLD AUTO: 3.53 THOUSANDS/ÂΜL (ref 1.85–7.62)
NEUTS SEG NFR BLD AUTO: 61 % (ref 43–75)
NRBC BLD AUTO-RTO: 0 /100 WBCS
PLATELET # BLD AUTO: 237 THOUSANDS/UL (ref 149–390)
PMV BLD AUTO: 12.4 FL (ref 8.9–12.7)
POTASSIUM SERPL-SCNC: 5 MMOL/L (ref 3.5–5.3)
PROT SERPL-MCNC: 7.1 G/DL (ref 6.4–8.4)
RBC # BLD AUTO: 4.73 MILLION/UL (ref 3.81–5.12)
SODIUM SERPL-SCNC: 136 MMOL/L (ref 135–147)
WBC # BLD AUTO: 5.88 THOUSAND/UL (ref 4.31–10.16)

## 2024-01-10 PROCEDURE — 83690 ASSAY OF LIPASE: CPT | Performed by: PHYSICIAN ASSISTANT

## 2024-01-10 PROCEDURE — 80053 COMPREHEN METABOLIC PANEL: CPT | Performed by: PHYSICIAN ASSISTANT

## 2024-01-10 PROCEDURE — 84484 ASSAY OF TROPONIN QUANT: CPT | Performed by: PHYSICIAN ASSISTANT

## 2024-01-10 PROCEDURE — 93005 ELECTROCARDIOGRAM TRACING: CPT

## 2024-01-10 PROCEDURE — 74177 CT ABD & PELVIS W/CONTRAST: CPT

## 2024-01-10 PROCEDURE — 36415 COLL VENOUS BLD VENIPUNCTURE: CPT | Performed by: PHYSICIAN ASSISTANT

## 2024-01-10 PROCEDURE — 96374 THER/PROPH/DIAG INJ IV PUSH: CPT

## 2024-01-10 PROCEDURE — 96375 TX/PRO/DX INJ NEW DRUG ADDON: CPT

## 2024-01-10 PROCEDURE — 85025 COMPLETE CBC W/AUTO DIFF WBC: CPT | Performed by: PHYSICIAN ASSISTANT

## 2024-01-10 PROCEDURE — 83735 ASSAY OF MAGNESIUM: CPT | Performed by: PHYSICIAN ASSISTANT

## 2024-01-10 PROCEDURE — 99284 EMERGENCY DEPT VISIT MOD MDM: CPT

## 2024-01-10 PROCEDURE — G1004 CDSM NDSC: HCPCS

## 2024-01-10 PROCEDURE — 99285 EMERGENCY DEPT VISIT HI MDM: CPT | Performed by: PHYSICIAN ASSISTANT

## 2024-01-10 PROCEDURE — 96361 HYDRATE IV INFUSION ADD-ON: CPT

## 2024-01-10 RX ORDER — FAMOTIDINE 20 MG/1
20 TABLET, FILM COATED ORAL 2 TIMES DAILY
Qty: 14 TABLET | Refills: 0 | Status: SHIPPED | OUTPATIENT
Start: 2024-01-10 | End: 2024-01-17

## 2024-01-10 RX ORDER — ONDANSETRON 4 MG/1
4 TABLET, ORALLY DISINTEGRATING ORAL EVERY 6 HOURS PRN
Qty: 20 TABLET | Refills: 0 | Status: SHIPPED | OUTPATIENT
Start: 2024-01-10

## 2024-01-10 RX ORDER — MORPHINE SULFATE 4 MG/ML
4 INJECTION, SOLUTION INTRAMUSCULAR; INTRAVENOUS ONCE
Status: COMPLETED | OUTPATIENT
Start: 2024-01-10 | End: 2024-01-10

## 2024-01-10 RX ORDER — MECLIZINE HYDROCHLORIDE 25 MG/1
25 TABLET ORAL 3 TIMES DAILY PRN
Qty: 30 TABLET | Refills: 0 | Status: SHIPPED | OUTPATIENT
Start: 2024-01-10

## 2024-01-10 RX ORDER — ONDANSETRON 2 MG/ML
4 INJECTION INTRAMUSCULAR; INTRAVENOUS ONCE
Status: COMPLETED | OUTPATIENT
Start: 2024-01-10 | End: 2024-01-10

## 2024-01-10 RX ORDER — MECLIZINE HYDROCHLORIDE 25 MG/1
25 TABLET ORAL ONCE
Status: COMPLETED | OUTPATIENT
Start: 2024-01-10 | End: 2024-01-10

## 2024-01-10 RX ADMIN — MECLIZINE HYDROCHLORIDE 25 MG: 25 TABLET ORAL at 20:00

## 2024-01-10 RX ADMIN — IOHEXOL 25 ML: 240 INJECTION, SOLUTION INTRATHECAL; INTRAVASCULAR; INTRAVENOUS; ORAL at 20:47

## 2024-01-10 RX ADMIN — ONDANSETRON 4 MG: 2 INJECTION INTRAMUSCULAR; INTRAVENOUS at 19:38

## 2024-01-10 RX ADMIN — MORPHINE SULFATE 4 MG: 4 INJECTION INTRAVENOUS at 19:41

## 2024-01-10 RX ADMIN — IOHEXOL 100 ML: 350 INJECTION, SOLUTION INTRAVENOUS at 20:47

## 2024-01-10 RX ADMIN — SODIUM CHLORIDE 1000 ML: 0.9 INJECTION, SOLUTION INTRAVENOUS at 19:41

## 2024-01-10 NOTE — Clinical Note
Willa Bliar was seen and treated in our emergency department on 1/10/2024.                Diagnosis:     Willa  .    She may return on this date: 01/12/2024         If you have any questions or concerns, please don't hesitate to call.      Gil Aguilar PA-C    ______________________________           _______________          _______________  Hospital Representative                              Date                                Time

## 2024-01-10 NOTE — Clinical Note
Willa Blair was seen and treated in our emergency department on 1/10/2024.                Diagnosis:     Willa  .    She may return on this date: 01/12/2024         If you have any questions or concerns, please don't hesitate to call.      Gil Aguilar PA-C    ______________________________           _______________          _______________  Hospital Representative                              Date                                Time

## 2024-01-11 LAB
ATRIAL RATE: 70 BPM
P AXIS: 65 DEGREES
PR INTERVAL: 152 MS
QRS AXIS: 90 DEGREES
QRSD INTERVAL: 74 MS
QT INTERVAL: 402 MS
QTC INTERVAL: 434 MS
T WAVE AXIS: 47 DEGREES
VENTRICULAR RATE: 70 BPM

## 2024-01-11 NOTE — ED PROVIDER NOTES
History  Chief Complaint   Patient presents with    Vomiting     Patient reports n/v/d over past two days. Reports other family at home also vomiting. Hx of bariatric surgery 2 months ago.      Willa is a 48-year-old female presenting with 2-3 days of nausea, vomiting, diarrhea, and epigastric abdominal pain.  She reports numerous sick contacts at home with similar GI symptoms.  Reports nonbloody vomiting and nonbloody diarrhea daily since onset.  Reports epigastric abdominal pain which is severe at this time.  This pain does not radiate to her chest or back.  She has no associated chest pain or dyspnea.  She does report feeling nonspecific dizziness since yesterday afternoon.  Reports it is not a spinning sensation, although she does feel somewhat off balance when walking.  She reports this dizziness is worst with standing quickly.  She also notes a bilateral frontal headache.  No fevers reported.  She did undergo bariatric surgery about 6 months ago.      History provided by:  Patient   used: Yes        Prior to Admission Medications   Prescriptions Last Dose Informant Patient Reported? Taking?   Semaglutide (Rybelsus) 7 MG TABS   No No   Sig: Take 1 tablet by mouth in the morning   Patient not taking: Reported on 3/29/2022    amLODIPine (NORVASC) 5 mg tablet   No No   Sig: Take 1 tablet (5 mg total) by mouth daily   Patient not taking: Reported on 3/29/2022    dextromethorphan-guaiFENesin (ROBITUSSIN DM)  mg/5 mL syrup   No No   Sig: Take 5 mL by mouth 3 (three) times a day as needed for cough   Patient not taking: Reported on 3/29/2022    insulin degludec (Tresiba FlexTouch) 100 units/mL injection pen   No No   Sig: Inject 30 Units under the skin daily   Patient not taking: Reported on 3/29/2022    pravastatin (PRAVACHOL) 20 mg tablet   No No   Sig: Take 1 tablet (20 mg total) by mouth daily   Patient not taking: Reported on 3/29/2022    sitaGLIPtin (JANUVIA) 100 mg tablet   No No    Sig: Take 1 tablet (100 mg total) by mouth daily   Patient not taking: Reported on 3/29/2022       Facility-Administered Medications: None       Past Medical History:   Diagnosis Date    Diabetes mellitus (HCC)     Hypertension        Past Surgical History:   Procedure Laterality Date     SECTION      FINGER SURGERY      GASTRIC BYPASS LAPAROSCOPIC      in [facility], 2023       Family History   Problem Relation Age of Onset    Hypertension Mother     Diabetes Mother     Hypertension Father     Diabetes Father      I have reviewed and agree with the history as documented.    E-Cigarette/Vaping    E-Cigarette Use Never User      E-Cigarette/Vaping Substances     Social History     Tobacco Use    Smoking status: Never    Smokeless tobacco: Never   Vaping Use    Vaping status: Never Used   Substance Use Topics    Alcohol use: Never    Drug use: Never       Review of Systems   Constitutional:  Positive for fatigue. Negative for chills and fever.   HENT:  Negative for congestion, rhinorrhea and sore throat.    Eyes:  Negative for pain and visual disturbance.   Respiratory:  Negative for cough, shortness of breath and wheezing.    Cardiovascular:  Negative for chest pain and palpitations.   Gastrointestinal:  Positive for abdominal pain, diarrhea, nausea and vomiting.   Genitourinary:  Negative for dysuria, frequency and urgency.   Musculoskeletal:  Negative for back pain, neck pain and neck stiffness.   Skin:  Negative for rash and wound.   Neurological:  Positive for dizziness. Negative for weakness, light-headedness and numbness.       Physical Exam  Physical Exam  Constitutional:       General: She is not in acute distress.     Appearance: She is well-developed. She is not diaphoretic.   HENT:      Head: Normocephalic and atraumatic.      Right Ear: External ear normal.      Left Ear: External ear normal.   Eyes:      Extraocular Movements:      Right eye: Nystagmus present.      Left eye: Nystagmus  present.      Conjunctiva/sclera: Conjunctivae normal.      Pupils: Pupils are equal, round, and reactive to light.      Comments: Unidirectional leftward nystagmus noted.    Cardiovascular:      Rate and Rhythm: Normal rate and regular rhythm.   Pulmonary:      Effort: Pulmonary effort is normal. No accessory muscle usage or respiratory distress.      Breath sounds: No wheezing or rales.   Abdominal:      General: Abdomen is flat. There is no distension.      Tenderness: There is abdominal tenderness. There is no right CVA tenderness, left CVA tenderness or guarding.      Comments: Epigastric tenderness without rigidity, rebound, or guarding.  Negative Spicer's.  No CVA tenderness.   Musculoskeletal:      Cervical back: Normal range of motion. No rigidity.   Skin:     General: Skin is warm and dry.      Capillary Refill: Capillary refill takes less than 2 seconds.      Findings: No erythema or rash.   Neurological:      Mental Status: She is alert and oriented to person, place, and time.      Cranial Nerves: Cranial nerves 2-12 are intact.      Sensory: Sensation is intact.      Motor: Motor function is intact. No abnormal muscle tone.      Coordination: Coordination is intact. Coordination normal. Finger-Nose-Finger Test and Heel to Shin Test normal.   Psychiatric:         Behavior: Behavior normal.         Thought Content: Thought content normal.         Judgment: Judgment normal.         Vital Signs  ED Triage Vitals   Temperature Pulse Respirations Blood Pressure SpO2   01/10/24 1739 01/10/24 1739 01/10/24 1739 01/10/24 1739 01/10/24 1739   98.3 °F (36.8 °C) 74 18 101/54 97 %      Temp Source Heart Rate Source Patient Position - Orthostatic VS BP Location FiO2 (%)   01/10/24 1739 01/10/24 1739 01/10/24 1739 01/10/24 1739 --   Oral Monitor Sitting Left arm       Pain Score       01/10/24 1941       10 - Worst Possible Pain           Vitals:    01/10/24 1739 01/10/24 2000 01/10/24 2238   BP: 101/54 (!) 184/87  159/81   Pulse: 74 72 66   Patient Position - Orthostatic VS: Sitting Lying Lying         Visual Acuity      ED Medications  Medications   sodium chloride 0.9 % bolus 1,000 mL (0 mL Intravenous Stopped 1/10/24 2238)   ondansetron (ZOFRAN) injection 4 mg (4 mg Intravenous Given 1/10/24 1938)   morphine injection 4 mg (4 mg Intravenous Given 1/10/24 1941)   meclizine (ANTIVERT) tablet 25 mg (25 mg Oral Given 1/10/24 2000)   iohexol (OMNIPAQUE) 350 MG/ML injection (MULTI-DOSE) 100 mL (100 mL Intravenous Given 1/10/24 2047)   iohexol (OMNIPAQUE) 240 MG/ML solution 25 mL (25 mL Oral Given 1/10/24 2047)       Diagnostic Studies  Results Reviewed       Procedure Component Value Units Date/Time    Comprehensive metabolic panel [[id number]]  (Abnormal) Collected: 01/10/24 1945    Lab Status: Final result Specimen: Blood from Arm, Left Updated: 01/10/24 2015     Sodium 136 mmol/L      Potassium 5.0 mmol/L      Chloride 107 mmol/L      CO2 22 mmol/L      ANION GAP 7 mmol/L      BUN 33 mg/dL      Creatinine 1.30 mg/dL      Glucose 140 mg/dL      Calcium 9.0 mg/dL      AST 25 U/L      ALT 14 U/L      Alkaline Phosphatase 63 U/L      Total Protein 7.1 g/dL      Albumin 3.9 g/dL      Total Bilirubin 0.47 mg/dL      eGFR 48 ml/min/1.73sq m     Narrative:      National Kidney Disease Foundation guidelines for Chronic Kidney Disease (CKD):     Stage 1 with normal or high GFR (GFR > 90 mL/min/1.73 square meters)    Stage 2 Mild CKD (GFR = 60-89 mL/min/1.73 square meters)    Stage 3A Moderate CKD (GFR = 45-59 mL/min/1.73 square meters)    Stage 3B Moderate CKD (GFR = 30-44 mL/min/1.73 square meters)    Stage 4 Severe CKD (GFR = 15-29 mL/min/1.73 square meters)    Stage 5 End Stage CKD (GFR <15 mL/min/1.73 square meters)  Note: GFR calculation is accurate only with a steady state creatinine    Lipase [[id number]]  (Normal) Collected: 01/10/24 1945    Lab Status: Final result Specimen: Blood from Arm, Left Updated: 01/10/24 2015     Lipase  11 u/L     Magnesium [[id number]]  (Abnormal) Collected: 01/10/24 1945    Lab Status: Final result Specimen: Blood from Arm, Left Updated: 01/10/24 2015     Magnesium 1.8 mg/dL     HS Troponin 0hr (reflex protocol) [[id number]]  (Normal) Collected: 01/10/24 1945    Lab Status: Final result Specimen: Blood from Arm, Left Updated: 01/10/24 2015     hs TnI 0hr 4 ng/L     CBC and differential [[id number]] Collected: 01/10/24 1945    Lab Status: Final result Specimen: Blood from Arm, Left Updated: 01/10/24 1952     WBC 5.88 Thousand/uL      RBC 4.73 Million/uL      Hemoglobin 14.0 g/dL      Hematocrit 44.3 %      MCV 94 fL      MCH 29.6 pg      MCHC 31.6 g/dL      RDW 12.8 %      MPV 12.4 fL      Platelets 237 Thousands/uL      nRBC 0 /100 WBCs      Neutrophils Relative 61 %      Immat GRANS % 0 %      Lymphocytes Relative 34 %      Monocytes Relative 5 %      Eosinophils Relative 0 %      Basophils Relative 0 %      Neutrophils Absolute 3.53 Thousands/µL      Immature Grans Absolute 0.02 Thousand/uL      Lymphocytes Absolute 2.01 Thousands/µL      Monocytes Absolute 0.28 Thousand/µL      Eosinophils Absolute 0.02 Thousand/µL      Basophils Absolute 0.02 Thousands/µL                    CT abdomen pelvis with contrast   Final Result by Paul Green MD (01/10 2210)      1.  Distended gallbladder with gallstone at the gallbladder neck. No CT evidence gallbladder wall thickening or pericholecystic fluid  to suggest cholecystitis at this time. If there is continued clinical concern for cholecystitis, further evaluation    with ultrasound and/or HIDA scan may be obtained.   2.  Status post sleeve gastrectomy. No bowel obstruction.            Workstation performed: [id number]                    Procedures  ECG 12 Lead Documentation Only    Date/Time: 1/10/2024 7:50 PM    Performed by: Gil Aguilar PA-C  Authorized by: Gil Aguilar PA-C    Indications / Diagnosis:  Dizziness  ECG reviewed by me, the ED Provider: yes     Patient location:  ED  Previous ECG:     Previous ECG:  Compared to current    Similarity:  No change    Comparison to cardiac monitor: Yes    Interpretation:     Interpretation: normal    Rate:     ECG rate:  68    ECG rate assessment: normal    Rhythm:     Rhythm: sinus rhythm    Ectopy:     Ectopy: PAC    QRS:     QRS axis:  Normal    QRS intervals:  Normal  Conduction:     Conduction: normal    ST segments:     ST segments:  Normal  T waves:     T waves: normal             ED Course                               SBIRT 20yo+      Flowsheet Row Most Recent Value   Initial Alcohol Screen: US AUDIT-C     1. How often do you have a drink containing alcohol? 0 Filed at: 01/10/2024 1849   2. How many drinks containing alcohol do you have on a typical day you are drinking?  0 Filed at: 01/10/2024 1849   3b. FEMALE Any Age, or MALE 65+: How often do you have 4 or more drinks on one occassion? 0 Filed at: 01/10/2024 1849   Audit-C Score 0 Filed at: 01/10/2024 1849   LEELEE: How many times in the past year have you...    Used an illegal drug or used a prescription medication for non-medical reasons? Never Filed at: 01/10/2024 1849                      Medical Decision Making  Patient presenting with nausea, vomiting, diarrhea, epigastric abdominal pain for the past 2-3 days.  Sick contacts at home with similar GI symptoms.  Also reporting dizziness with onset yesterday afternoon, this appears positional in etiology, worse with sitting up quickly and certain movements.  She also has unidirectional leftward nystagmus on exam, otherwise nonfocal neurologic exam.  This is suspected to be peripheral in etiology.  She has epigastric abdominal pain and tenderness on exam without peritoneal signs, negative Spicer's.    Workup initiated including CBC for white blood cell count and hemoglobin, CMP for electrolytes/LFTs, lipase for pancreatitis, magnesium level, EKG/troponin for cardiac ischemia/ectopy.  This workup is largely  benign.  CT abdomen/pelvis obtained with bariatric protocol-gallstone noted although felt less likely to be symptomatic given pain is epigastric and likely related to gastroenteritis.  Patient reports resolution of nausea and significant improvement in symptoms with ED therapy.  Dizziness is resolved following meclizine.  Will discharge with course of Pepcid, Zofran as needed, meclizine as needed recurrent dizziness.  Plenty of fluids encouraged.  Follow-up with PCP recommended, referral provided for same.  Follow-up with surgery recommended for gallstone, referral also provided for this.  Return to ED indications discussed.    Amount and/or Complexity of Data Reviewed  Labs: ordered.  Radiology: ordered.    Risk  Prescription drug management.             Disposition  Final diagnoses:   Nausea vomiting and diarrhea   Epigastric abdominal pain   Dizziness   Gallstone     Time reflects when diagnosis was documented in both MDM as applicable and the Disposition within this note       Time User Action Codes Description Comment    1/10/2024 10:48 PM Gil Aguilar Add [R11.2,  R19.7] Nausea vomiting and diarrhea     1/10/2024 10:49 PM Gil Aguilar Add [R10.13] Epigastric abdominal pain     1/10/2024 11:03 PM Gil Aguilar Add [R42] Dizziness     1/10/2024 11:16 PM Gil Aguilar Add [K80.20] Gallstone           ED Disposition       ED Disposition   Discharge    Condition   Stable    Date/Time   Wed Jona 10, 2024 2247    Comment   Willa Blair discharge to home/self care.                   Follow-up Information       Follow up With Specialties Details Why Contact Info Additional Information    [facility] Emergency Medicine  If symptoms worsen [address] [address]  [phone] [facility], [address] [place], [place], [address]    [facility]  Medicine Schedule an appointment as soon as possible for a visit   [address]  [facility] [address] [phone] [facility] [place], [address], [place], [place], [address] [phone]    [facility] Surgery Schedule an appointment as soon as possible for a visit   [address]  [place] [address] [phone] [facility] [place], [address], [place], [place], [address] [phone]            Discharge Medication List as of 1/10/2024 11:17 PM        START taking these medications    Details   famotidine (PEPCID) 20 mg tablet Take 1 tablet (20 mg total) by mouth 2 (two) times a day for 7 days, Starting Wed 1/10/2024, Until Wed 1/17/2024, Normal      meclizine (ANTIVERT) 25 mg tablet Take 1 tablet (25 mg total) by mouth 3 (three) times a day as needed for dizziness, Starting Wed 1/10/2024, Normal      ondansetron (ZOFRAN-ODT) 4 mg disintegrating tablet Take 1 tablet (4 mg total) by mouth every 6 (six) hours as needed for nausea or vomiting, Starting Wed 1/10/2024, Normal           CONTINUE these medications which have NOT CHANGED    Details   amLODIPine (NORVASC) 5 mg tablet Take 1 tablet (5 mg total) by mouth daily, Starting Sat 1/1/2022, Normal      dextromethorphan-guaiFENesin (ROBITUSSIN DM)  mg/5 mL syrup Take 5 mL by mouth 3 (three) times a day as needed for cough, Starting Sat 1/1/2022, Normal      insulin degludec (Tresiba FlexTouch) 100 units/mL injection pen Inject 30 Units under the skin daily, Starting Sat 1/1/2022, Normal      pravastatin (PRAVACHOL) 20 mg tablet Take 1 tablet (20 mg total) by mouth daily, Starting Sat 1/1/2022, Normal      Semaglutide (Rybelsus) 7 MG TABS Take 1 tablet by mouth in the morning, Starting Sat 1/1/2022, Normal      sitaGLIPtin (JANUVIA) 100 mg tablet Take 1 tablet (100 mg total) by mouth  daily, Starting Sat 1/1/2022, Normal                 PDMP Review       None            ED Provider  Electronically Signed by             Gil Aguilar PA-C  01/11/24 0005

## 2024-01-11 NOTE — DISCHARGE INSTRUCTIONS
Please refer to the attached information for strict return instructions. If symptoms worsen or new symptoms develop please return to the ER. Please follow up with your primary care physician for re-evaluation.

## 2024-01-17 ENCOUNTER — APPOINTMENT (OUTPATIENT)
Dept: LAB | Facility: MEDICAL CENTER | Age: 49
End: 2024-01-17

## 2024-01-17 ENCOUNTER — OCCMED (OUTPATIENT)
Dept: URGENT CARE | Facility: MEDICAL CENTER | Age: 49
End: 2024-01-17

## 2024-01-17 DIAGNOSIS — Z02.1 ENCOUNTER FOR PRE-EMPLOYMENT HEALTH SCREENING EXAMINATION: ICD-10-CM

## 2024-01-17 DIAGNOSIS — Z02.1 ENCOUNTER FOR PRE-EMPLOYMENT HEALTH SCREENING EXAMINATION: Primary | ICD-10-CM

## 2024-01-17 PROCEDURE — 36415 COLL VENOUS BLD VENIPUNCTURE: CPT

## 2024-01-17 PROCEDURE — 86480 TB TEST CELL IMMUN MEASURE: CPT

## 2024-01-21 LAB
GAMMA INTERFERON BACKGROUND BLD IA-ACNC: 0.19 IU/ML
M TB IFN-G BLD-IMP: NEGATIVE
M TB IFN-G CD4+ BCKGRND COR BLD-ACNC: -0.03 IU/ML
M TB IFN-G CD4+ BCKGRND COR BLD-ACNC: -0.05 IU/ML
MITOGEN IGNF BCKGRD COR BLD-ACNC: 9.81 IU/ML

## 2024-05-21 ENCOUNTER — APPOINTMENT (OUTPATIENT)
Dept: LAB | Age: 49
End: 2024-05-21

## 2024-05-21 ENCOUNTER — APPOINTMENT (OUTPATIENT)
Dept: URGENT CARE | Age: 49
End: 2024-05-21

## 2024-05-21 DIAGNOSIS — Z02.1 PRE-EMPLOYMENT HEALTH SCREENING EXAMINATION: ICD-10-CM

## 2024-05-21 PROCEDURE — 86480 TB TEST CELL IMMUN MEASURE: CPT

## 2024-05-21 PROCEDURE — 36415 COLL VENOUS BLD VENIPUNCTURE: CPT

## 2024-05-22 LAB
GAMMA INTERFERON BACKGROUND BLD IA-ACNC: 0.15 IU/ML
M TB IFN-G BLD-IMP: NEGATIVE
M TB IFN-G CD4+ BCKGRND COR BLD-ACNC: -0.02 IU/ML
M TB IFN-G CD4+ BCKGRND COR BLD-ACNC: 0.01 IU/ML
MITOGEN IGNF BCKGRD COR BLD-ACNC: 9.85 IU/ML

## 2024-08-17 ENCOUNTER — HOSPITAL ENCOUNTER (INPATIENT)
Facility: HOSPITAL | Age: 49
LOS: 4 days | Discharge: HOME HEALTH | DRG: 323 | End: 2024-08-22
Attending: EMERGENCY MEDICINE | Admitting: INTERNAL MEDICINE
Payer: COMMERCIAL

## 2024-08-17 ENCOUNTER — APPOINTMENT (EMERGENCY)
Dept: RADIOLOGY | Facility: HOSPITAL | Age: 49
DRG: 323 | End: 2024-08-17
Payer: COMMERCIAL

## 2024-08-17 ENCOUNTER — ANESTHESIA (OUTPATIENT)
Dept: PERIOP | Facility: HOSPITAL | Age: 49
DRG: 323 | End: 2024-08-17
Payer: COMMERCIAL

## 2024-08-17 ENCOUNTER — ANESTHESIA EVENT (OUTPATIENT)
Dept: PERIOP | Facility: HOSPITAL | Age: 49
DRG: 323 | End: 2024-08-17
Payer: COMMERCIAL

## 2024-08-17 ENCOUNTER — APPOINTMENT (OUTPATIENT)
Dept: RADIOLOGY | Facility: HOSPITAL | Age: 49
DRG: 323 | End: 2024-08-17
Payer: COMMERCIAL

## 2024-08-17 DIAGNOSIS — S72.142A CLOSED INTERTROCHANTERIC FRACTURE OF HIP, LEFT, INITIAL ENCOUNTER (HCC): Primary | ICD-10-CM

## 2024-08-17 DIAGNOSIS — Z98.84 H/O BARIATRIC SURGERY: ICD-10-CM

## 2024-08-17 DIAGNOSIS — E11.69 TYPE 2 DIABETES MELLITUS WITH OBESITY  (HCC): Chronic | ICD-10-CM

## 2024-08-17 DIAGNOSIS — E66.9 TYPE 2 DIABETES MELLITUS WITH OBESITY  (HCC): Chronic | ICD-10-CM

## 2024-08-17 DIAGNOSIS — S72.002A CLOSED DISPLACED FRACTURE OF LEFT FEMORAL NECK (HCC): ICD-10-CM

## 2024-08-17 LAB
ABO GROUP BLD: NORMAL
ABO GROUP BLD: NORMAL
ALBUMIN SERPL BCG-MCNC: 3.7 G/DL (ref 3.5–5)
ALP SERPL-CCNC: 65 U/L (ref 34–104)
ALT SERPL W P-5'-P-CCNC: 16 U/L (ref 7–52)
ANION GAP SERPL CALCULATED.3IONS-SCNC: 9 MMOL/L (ref 4–13)
APTT PPP: 29 SECONDS (ref 23–34)
AST SERPL W P-5'-P-CCNC: 18 U/L (ref 13–39)
ATRIAL RATE: 66 BPM
BASOPHILS # BLD AUTO: 0.03 THOUSANDS/ÂΜL (ref 0–0.1)
BASOPHILS NFR BLD AUTO: 0 % (ref 0–1)
BILIRUB SERPL-MCNC: 0.45 MG/DL (ref 0.2–1)
BLD GP AB SCN SERPL QL: NEGATIVE
BUN SERPL-MCNC: 28 MG/DL (ref 5–25)
CALCIUM SERPL-MCNC: 8.8 MG/DL (ref 8.4–10.2)
CHLORIDE SERPL-SCNC: 107 MMOL/L (ref 96–108)
CO2 SERPL-SCNC: 22 MMOL/L (ref 21–32)
CREAT SERPL-MCNC: 0.99 MG/DL (ref 0.6–1.3)
EOSINOPHIL # BLD AUTO: 0.05 THOUSAND/ÂΜL (ref 0–0.61)
EOSINOPHIL NFR BLD AUTO: 1 % (ref 0–6)
ERYTHROCYTE [DISTWIDTH] IN BLOOD BY AUTOMATED COUNT: 12.5 % (ref 11.6–15.1)
EST. AVERAGE GLUCOSE BLD GHB EST-MCNC: 174 MG/DL
GFR SERPL CREATININE-BSD FRML MDRD: 67 ML/MIN/1.73SQ M
GLUCOSE SERPL-MCNC: 122 MG/DL (ref 65–140)
GLUCOSE SERPL-MCNC: 132 MG/DL (ref 65–140)
GLUCOSE SERPL-MCNC: 182 MG/DL (ref 65–140)
GLUCOSE SERPL-MCNC: 202 MG/DL (ref 65–140)
GLUCOSE SERPL-MCNC: 216 MG/DL (ref 65–140)
HBA1C MFR BLD: 7.7 %
HCT VFR BLD AUTO: 41.9 % (ref 34.8–46.1)
HGB BLD-MCNC: 13.3 G/DL (ref 11.5–15.4)
IMM GRANULOCYTES # BLD AUTO: 0.05 THOUSAND/UL (ref 0–0.2)
IMM GRANULOCYTES NFR BLD AUTO: 1 % (ref 0–2)
INR PPP: 1.01 (ref 0.85–1.19)
LYMPHOCYTES # BLD AUTO: 2.9 THOUSANDS/ÂΜL (ref 0.6–4.47)
LYMPHOCYTES NFR BLD AUTO: 37 % (ref 14–44)
MCH RBC QN AUTO: 30.2 PG (ref 26.8–34.3)
MCHC RBC AUTO-ENTMCNC: 31.7 G/DL (ref 31.4–37.4)
MCV RBC AUTO: 95 FL (ref 82–98)
MONOCYTES # BLD AUTO: 0.27 THOUSAND/ÂΜL (ref 0.17–1.22)
MONOCYTES NFR BLD AUTO: 3 % (ref 4–12)
NEUTROPHILS # BLD AUTO: 4.59 THOUSANDS/ÂΜL (ref 1.85–7.62)
NEUTS SEG NFR BLD AUTO: 58 % (ref 43–75)
NRBC BLD AUTO-RTO: 0 /100 WBCS
P AXIS: 33 DEGREES
PLATELET # BLD AUTO: 211 THOUSANDS/UL (ref 149–390)
PLATELET # BLD AUTO: 233 THOUSANDS/UL (ref 149–390)
PMV BLD AUTO: 11.1 FL (ref 8.9–12.7)
PMV BLD AUTO: 11.5 FL (ref 8.9–12.7)
POTASSIUM SERPL-SCNC: 4.3 MMOL/L (ref 3.5–5.3)
PR INTERVAL: 150 MS
PROT SERPL-MCNC: 6.9 G/DL (ref 6.4–8.4)
PROTHROMBIN TIME: 13.5 SECONDS (ref 12.3–15)
QRS AXIS: 54 DEGREES
QRSD INTERVAL: 76 MS
QT INTERVAL: 418 MS
QTC INTERVAL: 438 MS
RBC # BLD AUTO: 4.4 MILLION/UL (ref 3.81–5.12)
RH BLD: POSITIVE
RH BLD: POSITIVE
SODIUM SERPL-SCNC: 138 MMOL/L (ref 135–147)
SPECIMEN EXPIRATION DATE: NORMAL
T WAVE AXIS: 30 DEGREES
VENTRICULAR RATE: 66 BPM
WBC # BLD AUTO: 7.89 THOUSAND/UL (ref 4.31–10.16)

## 2024-08-17 PROCEDURE — 99221 1ST HOSP IP/OBS SF/LOW 40: CPT | Performed by: STUDENT IN AN ORGANIZED HEALTH CARE EDUCATION/TRAINING PROGRAM

## 2024-08-17 PROCEDURE — C1776 JOINT DEVICE (IMPLANTABLE): HCPCS | Performed by: ORTHOPAEDIC SURGERY

## 2024-08-17 PROCEDURE — 88305 TISSUE EXAM BY PATHOLOGIST: CPT | Performed by: PATHOLOGY

## 2024-08-17 PROCEDURE — 86900 BLOOD TYPING SEROLOGIC ABO: CPT | Performed by: EMERGENCY MEDICINE

## 2024-08-17 PROCEDURE — 83036 HEMOGLOBIN GLYCOSYLATED A1C: CPT

## 2024-08-17 PROCEDURE — 85025 COMPLETE CBC W/AUTO DIFF WBC: CPT | Performed by: EMERGENCY MEDICINE

## 2024-08-17 PROCEDURE — 93010 ELECTROCARDIOGRAM REPORT: CPT

## 2024-08-17 PROCEDURE — 27130 TOTAL HIP ARTHROPLASTY: CPT | Performed by: PHYSICIAN ASSISTANT

## 2024-08-17 PROCEDURE — 96365 THER/PROPH/DIAG IV INF INIT: CPT

## 2024-08-17 PROCEDURE — 72170 X-RAY EXAM OF PELVIS: CPT

## 2024-08-17 PROCEDURE — 88311 DECALCIFY TISSUE: CPT | Performed by: PATHOLOGY

## 2024-08-17 PROCEDURE — 85049 AUTOMATED PLATELET COUNT: CPT | Performed by: STUDENT IN AN ORGANIZED HEALTH CARE EDUCATION/TRAINING PROGRAM

## 2024-08-17 PROCEDURE — 93005 ELECTROCARDIOGRAM TRACING: CPT

## 2024-08-17 PROCEDURE — 99232 SBSQ HOSP IP/OBS MODERATE 35: CPT | Performed by: INTERNAL MEDICINE

## 2024-08-17 PROCEDURE — 82948 REAGENT STRIP/BLOOD GLUCOSE: CPT

## 2024-08-17 PROCEDURE — 27130 TOTAL HIP ARTHROPLASTY: CPT | Performed by: ORTHOPAEDIC SURGERY

## 2024-08-17 PROCEDURE — 86850 RBC ANTIBODY SCREEN: CPT | Performed by: EMERGENCY MEDICINE

## 2024-08-17 PROCEDURE — 85610 PROTHROMBIN TIME: CPT | Performed by: EMERGENCY MEDICINE

## 2024-08-17 PROCEDURE — C1713 ANCHOR/SCREW BN/BN,TIS/BN: HCPCS | Performed by: ORTHOPAEDIC SURGERY

## 2024-08-17 PROCEDURE — 99291 CRITICAL CARE FIRST HOUR: CPT | Performed by: EMERGENCY MEDICINE

## 2024-08-17 PROCEDURE — 80053 COMPREHEN METABOLIC PANEL: CPT | Performed by: EMERGENCY MEDICINE

## 2024-08-17 PROCEDURE — 36415 COLL VENOUS BLD VENIPUNCTURE: CPT | Performed by: EMERGENCY MEDICINE

## 2024-08-17 PROCEDURE — 99205 OFFICE O/P NEW HI 60 MIN: CPT | Performed by: ORTHOPAEDIC SURGERY

## 2024-08-17 PROCEDURE — 73502 X-RAY EXAM HIP UNI 2-3 VIEWS: CPT

## 2024-08-17 PROCEDURE — 86901 BLOOD TYPING SEROLOGIC RH(D): CPT | Performed by: EMERGENCY MEDICINE

## 2024-08-17 PROCEDURE — 85730 THROMBOPLASTIN TIME PARTIAL: CPT | Performed by: EMERGENCY MEDICINE

## 2024-08-17 PROCEDURE — 0SRB0JA REPLACEMENT OF LEFT HIP JOINT WITH SYNTHETIC SUBSTITUTE, UNCEMENTED, OPEN APPROACH: ICD-10-PCS | Performed by: ORTHOPAEDIC SURGERY

## 2024-08-17 PROCEDURE — 96375 TX/PRO/DX INJ NEW DRUG ADDON: CPT

## 2024-08-17 PROCEDURE — 71045 X-RAY EXAM CHEST 1 VIEW: CPT

## 2024-08-17 PROCEDURE — 99284 EMERGENCY DEPT VISIT MOD MDM: CPT

## 2024-08-17 DEVICE — BIOLOX DELTA CERAMIC FEMORAL HEAD 32MM DIA +1 12/14 TAPER
Type: IMPLANTABLE DEVICE | Site: HIP | Status: FUNCTIONAL
Brand: BIOLOX DELTA

## 2024-08-17 DEVICE — PINNACLE HIP SOLUTIONS ALTRX POLYETHYLENE ACETABULAR LINER NEUTRAL 32MM ID 48MM OD
Type: IMPLANTABLE DEVICE | Site: HIP | Status: FUNCTIONAL
Brand: PINNACLE ALTRX

## 2024-08-17 DEVICE — PINNACLE CANCELLOUS BONE SCREW 6.5MM X 30MM
Type: IMPLANTABLE DEVICE | Site: HIP | Status: FUNCTIONAL
Brand: PINNACLE

## 2024-08-17 DEVICE — ACTIS DUOFIX HIP PROSTHESIS (FEMORAL STEM 12/14 TAPER CEMENTLESS SIZE 4 HIGH COLLAR)  CE
Type: IMPLANTABLE DEVICE | Site: HIP | Status: FUNCTIONAL
Brand: ACTIS

## 2024-08-17 DEVICE — PINNACLE POROCOAT ACETABULAR SHELL SECTOR II 48MM OD
Type: IMPLANTABLE DEVICE | Site: HIP | Status: FUNCTIONAL
Brand: PINNACLE POROCOAT

## 2024-08-17 RX ORDER — HYDROMORPHONE HCL IN WATER/PF 6 MG/30 ML
0.2 PATIENT CONTROLLED ANALGESIA SYRINGE INTRAVENOUS EVERY 4 HOURS PRN
Status: DISCONTINUED | OUTPATIENT
Start: 2024-08-17 | End: 2024-08-17

## 2024-08-17 RX ORDER — DIPHENHYDRAMINE HCL 25 MG
25 TABLET ORAL EVERY 6 HOURS PRN
Status: DISCONTINUED | OUTPATIENT
Start: 2024-08-17 | End: 2024-08-22 | Stop reason: HOSPADM

## 2024-08-17 RX ORDER — HYDROMORPHONE HCL IN WATER/PF 6 MG/30 ML
0.2 PATIENT CONTROLLED ANALGESIA SYRINGE INTRAVENOUS EVERY 2 HOUR PRN
Status: DISCONTINUED | OUTPATIENT
Start: 2024-08-17 | End: 2024-08-19

## 2024-08-17 RX ORDER — ONDANSETRON 2 MG/ML
4 INJECTION INTRAMUSCULAR; INTRAVENOUS ONCE AS NEEDED
Status: DISCONTINUED | OUTPATIENT
Start: 2024-08-17 | End: 2024-08-17 | Stop reason: HOSPADM

## 2024-08-17 RX ORDER — SODIUM CHLORIDE, SODIUM LACTATE, POTASSIUM CHLORIDE, CALCIUM CHLORIDE 600; 310; 30; 20 MG/100ML; MG/100ML; MG/100ML; MG/100ML
INJECTION, SOLUTION INTRAVENOUS CONTINUOUS PRN
Status: DISCONTINUED | OUTPATIENT
Start: 2024-08-17 | End: 2024-08-17

## 2024-08-17 RX ORDER — INSULIN LISPRO 100 [IU]/ML
1-5 INJECTION, SOLUTION INTRAVENOUS; SUBCUTANEOUS
Status: DISCONTINUED | OUTPATIENT
Start: 2024-08-17 | End: 2024-08-17

## 2024-08-17 RX ORDER — LIDOCAINE HYDROCHLORIDE 20 MG/ML
INJECTION, SOLUTION EPIDURAL; INFILTRATION; INTRACAUDAL; PERINEURAL AS NEEDED
Status: DISCONTINUED | OUTPATIENT
Start: 2024-08-17 | End: 2024-08-17

## 2024-08-17 RX ORDER — FENTANYL CITRATE/PF 50 MCG/ML
50 SYRINGE (ML) INJECTION
Status: DISCONTINUED | OUTPATIENT
Start: 2024-08-17 | End: 2024-08-17 | Stop reason: HOSPADM

## 2024-08-17 RX ORDER — HYDROMORPHONE HCL/PF 1 MG/ML
0.5 SYRINGE (ML) INJECTION
Status: DISCONTINUED | OUTPATIENT
Start: 2024-08-17 | End: 2024-08-17 | Stop reason: HOSPADM

## 2024-08-17 RX ORDER — ONDANSETRON 2 MG/ML
4 INJECTION INTRAMUSCULAR; INTRAVENOUS ONCE
Status: COMPLETED | OUTPATIENT
Start: 2024-08-17 | End: 2024-08-17

## 2024-08-17 RX ORDER — SODIUM CHLORIDE, SODIUM LACTATE, POTASSIUM CHLORIDE, CALCIUM CHLORIDE 600; 310; 30; 20 MG/100ML; MG/100ML; MG/100ML; MG/100ML
100 INJECTION, SOLUTION INTRAVENOUS CONTINUOUS
Status: DISCONTINUED | OUTPATIENT
Start: 2024-08-17 | End: 2024-08-19

## 2024-08-17 RX ORDER — ONDANSETRON 4 MG/1
4 TABLET, ORALLY DISINTEGRATING ORAL EVERY 6 HOURS PRN
Status: DISCONTINUED | OUTPATIENT
Start: 2024-08-17 | End: 2024-08-22 | Stop reason: HOSPADM

## 2024-08-17 RX ORDER — INSULIN LISPRO 100 [IU]/ML
1-5 INJECTION, SOLUTION INTRAVENOUS; SUBCUTANEOUS
Status: DISCONTINUED | OUTPATIENT
Start: 2024-08-17 | End: 2024-08-22 | Stop reason: HOSPADM

## 2024-08-17 RX ORDER — HEPARIN SODIUM 5000 [USP'U]/ML
5000 INJECTION, SOLUTION INTRAVENOUS; SUBCUTANEOUS EVERY 8 HOURS SCHEDULED
Status: CANCELLED | OUTPATIENT
Start: 2024-08-17

## 2024-08-17 RX ORDER — CEFAZOLIN SODIUM 2 G/50ML
2000 SOLUTION INTRAVENOUS EVERY 8 HOURS
Status: COMPLETED | OUTPATIENT
Start: 2024-08-17 | End: 2024-08-18

## 2024-08-17 RX ORDER — CEFAZOLIN SODIUM 1 G/3ML
INJECTION, POWDER, FOR SOLUTION INTRAMUSCULAR; INTRAVENOUS AS NEEDED
Status: DISCONTINUED | OUTPATIENT
Start: 2024-08-17 | End: 2024-08-17

## 2024-08-17 RX ORDER — FOLIC ACID 1 MG/1
1 TABLET ORAL DAILY
Status: DISCONTINUED | OUTPATIENT
Start: 2024-08-17 | End: 2024-08-22 | Stop reason: HOSPADM

## 2024-08-17 RX ORDER — DOCUSATE SODIUM 100 MG/1
100 CAPSULE, LIQUID FILLED ORAL 2 TIMES DAILY
Status: DISCONTINUED | OUTPATIENT
Start: 2024-08-17 | End: 2024-08-22 | Stop reason: HOSPADM

## 2024-08-17 RX ORDER — PRAVASTATIN SODIUM 20 MG
20 TABLET ORAL
Status: DISCONTINUED | OUTPATIENT
Start: 2024-08-17 | End: 2024-08-22 | Stop reason: HOSPADM

## 2024-08-17 RX ORDER — MORPHINE SULFATE 10 MG/ML
INJECTION, SOLUTION INTRAMUSCULAR; INTRAVENOUS AS NEEDED
Status: DISCONTINUED | OUTPATIENT
Start: 2024-08-17 | End: 2024-08-17 | Stop reason: HOSPADM

## 2024-08-17 RX ORDER — TRANEXAMIC ACID 100 MG/ML
INJECTION, SOLUTION INTRAVENOUS AS NEEDED
Status: DISCONTINUED | OUTPATIENT
Start: 2024-08-17 | End: 2024-08-17

## 2024-08-17 RX ORDER — SENNOSIDES 8.6 MG
1 TABLET ORAL
Status: DISCONTINUED | OUTPATIENT
Start: 2024-08-17 | End: 2024-08-22 | Stop reason: HOSPADM

## 2024-08-17 RX ORDER — OXYCODONE HYDROCHLORIDE 5 MG/1
5 TABLET ORAL EVERY 4 HOURS PRN
Status: DISCONTINUED | OUTPATIENT
Start: 2024-08-17 | End: 2024-08-19

## 2024-08-17 RX ORDER — ACETAMINOPHEN 10 MG/ML
1000 INJECTION, SOLUTION INTRAVENOUS ONCE
Status: COMPLETED | OUTPATIENT
Start: 2024-08-17 | End: 2024-08-17

## 2024-08-17 RX ORDER — PROPOFOL 10 MG/ML
INJECTION, EMULSION INTRAVENOUS AS NEEDED
Status: DISCONTINUED | OUTPATIENT
Start: 2024-08-17 | End: 2024-08-17

## 2024-08-17 RX ORDER — ACETAMINOPHEN 325 MG/1
975 TABLET ORAL EVERY 8 HOURS SCHEDULED
Status: DISCONTINUED | OUTPATIENT
Start: 2024-08-17 | End: 2024-08-22 | Stop reason: HOSPADM

## 2024-08-17 RX ORDER — ROCURONIUM BROMIDE 10 MG/ML
INJECTION, SOLUTION INTRAVENOUS AS NEEDED
Status: DISCONTINUED | OUTPATIENT
Start: 2024-08-17 | End: 2024-08-17

## 2024-08-17 RX ORDER — FAMOTIDINE 20 MG/1
20 TABLET, FILM COATED ORAL 2 TIMES DAILY
Status: DISCONTINUED | OUTPATIENT
Start: 2024-08-17 | End: 2024-08-22 | Stop reason: HOSPADM

## 2024-08-17 RX ORDER — EPHEDRINE SULFATE 50 MG/ML
INJECTION INTRAVENOUS AS NEEDED
Status: DISCONTINUED | OUTPATIENT
Start: 2024-08-17 | End: 2024-08-17

## 2024-08-17 RX ORDER — DEXAMETHASONE SODIUM PHOSPHATE 10 MG/ML
INJECTION, SOLUTION INTRAMUSCULAR; INTRAVENOUS AS NEEDED
Status: DISCONTINUED | OUTPATIENT
Start: 2024-08-17 | End: 2024-08-17

## 2024-08-17 RX ORDER — FENTANYL CITRATE 50 UG/ML
50 INJECTION, SOLUTION INTRAMUSCULAR; INTRAVENOUS ONCE
Status: COMPLETED | OUTPATIENT
Start: 2024-08-17 | End: 2024-08-17

## 2024-08-17 RX ORDER — FENTANYL CITRATE 50 UG/ML
INJECTION, SOLUTION INTRAMUSCULAR; INTRAVENOUS AS NEEDED
Status: DISCONTINUED | OUTPATIENT
Start: 2024-08-17 | End: 2024-08-17

## 2024-08-17 RX ORDER — HYDROMORPHONE HCL/PF 1 MG/ML
SYRINGE (ML) INJECTION AS NEEDED
Status: DISCONTINUED | OUTPATIENT
Start: 2024-08-17 | End: 2024-08-17

## 2024-08-17 RX ORDER — TRANEXAMIC ACID 10 MG/ML
1000 INJECTION, SOLUTION INTRAVENOUS
Status: ACTIVE | OUTPATIENT
Start: 2024-08-17 | End: 2024-08-18

## 2024-08-17 RX ORDER — LANOLIN ALCOHOL/MO/W.PET/CERES
100 CREAM (GRAM) TOPICAL DAILY
Status: DISCONTINUED | OUTPATIENT
Start: 2024-08-17 | End: 2024-08-22 | Stop reason: HOSPADM

## 2024-08-17 RX ORDER — MEPERIDINE HYDROCHLORIDE 25 MG/ML
12.5 INJECTION INTRAMUSCULAR; INTRAVENOUS; SUBCUTANEOUS ONCE AS NEEDED
Status: DISCONTINUED | OUTPATIENT
Start: 2024-08-17 | End: 2024-08-17 | Stop reason: HOSPADM

## 2024-08-17 RX ORDER — HYDROMORPHONE HCL/PF 1 MG/ML
1 SYRINGE (ML) INJECTION ONCE
Status: COMPLETED | OUTPATIENT
Start: 2024-08-17 | End: 2024-08-17

## 2024-08-17 RX ORDER — CEFAZOLIN SODIUM 2 G/50ML
2000 SOLUTION INTRAVENOUS ONCE
Status: DISCONTINUED | OUTPATIENT
Start: 2024-08-17 | End: 2024-08-17 | Stop reason: SDUPTHER

## 2024-08-17 RX ORDER — ENOXAPARIN SODIUM 100 MG/ML
40 INJECTION SUBCUTANEOUS
Status: DISCONTINUED | OUTPATIENT
Start: 2024-08-18 | End: 2024-08-22 | Stop reason: HOSPADM

## 2024-08-17 RX ORDER — PROMETHAZINE HYDROCHLORIDE 25 MG/ML
6.25 INJECTION, SOLUTION INTRAMUSCULAR; INTRAVENOUS ONCE AS NEEDED
Status: DISCONTINUED | OUTPATIENT
Start: 2024-08-17 | End: 2024-08-17 | Stop reason: HOSPADM

## 2024-08-17 RX ORDER — ACETAMINOPHEN 325 MG/1
975 TABLET ORAL EVERY 6 HOURS SCHEDULED
Status: DISCONTINUED | OUTPATIENT
Start: 2024-08-17 | End: 2024-08-17

## 2024-08-17 RX ORDER — AMLODIPINE BESYLATE 5 MG/1
5 TABLET ORAL DAILY
Status: DISCONTINUED | OUTPATIENT
Start: 2024-08-17 | End: 2024-08-19

## 2024-08-17 RX ORDER — BUPIVACAINE HYDROCHLORIDE AND EPINEPHRINE 2.5; 5 MG/ML; UG/ML
INJECTION, SOLUTION EPIDURAL; INFILTRATION; INTRACAUDAL; PERINEURAL AS NEEDED
Status: DISCONTINUED | OUTPATIENT
Start: 2024-08-17 | End: 2024-08-17 | Stop reason: HOSPADM

## 2024-08-17 RX ORDER — BISACODYL 10 MG
10 SUPPOSITORY, RECTAL RECTAL DAILY PRN
Status: DISCONTINUED | OUTPATIENT
Start: 2024-08-17 | End: 2024-08-22 | Stop reason: HOSPADM

## 2024-08-17 RX ORDER — ONDANSETRON 2 MG/ML
INJECTION INTRAMUSCULAR; INTRAVENOUS AS NEEDED
Status: DISCONTINUED | OUTPATIENT
Start: 2024-08-17 | End: 2024-08-17

## 2024-08-17 RX ORDER — MIDAZOLAM HYDROCHLORIDE 2 MG/2ML
INJECTION, SOLUTION INTRAMUSCULAR; INTRAVENOUS AS NEEDED
Status: DISCONTINUED | OUTPATIENT
Start: 2024-08-17 | End: 2024-08-17

## 2024-08-17 RX ORDER — HYDRALAZINE HYDROCHLORIDE 20 MG/ML
10 INJECTION INTRAMUSCULAR; INTRAVENOUS EVERY 6 HOURS PRN
Status: DISCONTINUED | OUTPATIENT
Start: 2024-08-17 | End: 2024-08-22 | Stop reason: HOSPADM

## 2024-08-17 RX ADMIN — ACETAMINOPHEN 1000 MG: 10 INJECTION INTRAVENOUS at 14:46

## 2024-08-17 RX ADMIN — SENNOSIDES 8.6 MG: 8.6 TABLET, FILM COATED ORAL at 22:06

## 2024-08-17 RX ADMIN — FENTANYL CITRATE 50 MCG: 50 INJECTION INTRAMUSCULAR; INTRAVENOUS at 14:11

## 2024-08-17 RX ADMIN — Medication 1 TABLET: at 09:43

## 2024-08-17 RX ADMIN — FENTANYL CITRATE 50 MCG: 50 INJECTION INTRAMUSCULAR; INTRAVENOUS at 15:25

## 2024-08-17 RX ADMIN — INSULIN LISPRO 1 UNITS: 100 INJECTION, SOLUTION INTRAVENOUS; SUBCUTANEOUS at 22:06

## 2024-08-17 RX ADMIN — FOLIC ACID 1 MG: 1 TABLET ORAL at 09:43

## 2024-08-17 RX ADMIN — FENTANYL CITRATE 50 MCG: 50 INJECTION INTRAMUSCULAR; INTRAVENOUS at 05:05

## 2024-08-17 RX ADMIN — ACETAMINOPHEN 975 MG: 325 TABLET ORAL at 22:06

## 2024-08-17 RX ADMIN — FENTANYL CITRATE 50 MCG: 50 INJECTION INTRAMUSCULAR; INTRAVENOUS at 14:19

## 2024-08-17 RX ADMIN — ACETAMINOPHEN 975 MG: 325 TABLET ORAL at 06:43

## 2024-08-17 RX ADMIN — HYDROMORPHONE HYDROCHLORIDE 0.2 MG: 0.2 INJECTION, SOLUTION INTRAMUSCULAR; INTRAVENOUS; SUBCUTANEOUS at 09:43

## 2024-08-17 RX ADMIN — DEXAMETHASONE SODIUM PHOSPHATE 10 MG: 10 INJECTION INTRAMUSCULAR; INTRAVENOUS at 14:44

## 2024-08-17 RX ADMIN — SODIUM CHLORIDE, SODIUM LACTATE, POTASSIUM CHLORIDE, AND CALCIUM CHLORIDE: .6; .31; .03; .02 INJECTION, SOLUTION INTRAVENOUS at 13:56

## 2024-08-17 RX ADMIN — PROPOFOL 150 MG: 10 INJECTION, EMULSION INTRAVENOUS at 14:19

## 2024-08-17 RX ADMIN — EPHEDRINE SULFATE 10 MG: 50 INJECTION INTRAVENOUS at 14:42

## 2024-08-17 RX ADMIN — THIAMINE HCL TAB 100 MG 100 MG: 100 TAB at 09:43

## 2024-08-17 RX ADMIN — DOCUSATE SODIUM 100 MG: 100 CAPSULE, LIQUID FILLED ORAL at 17:52

## 2024-08-17 RX ADMIN — EPHEDRINE SULFATE 10 MG: 50 INJECTION INTRAVENOUS at 14:25

## 2024-08-17 RX ADMIN — FENTANYL CITRATE 50 MCG: 50 INJECTION INTRAMUSCULAR; INTRAVENOUS at 15:04

## 2024-08-17 RX ADMIN — TRANEXAMIC ACID 800 MG: 100 INJECTION, SOLUTION INTRAVENOUS at 05:08

## 2024-08-17 RX ADMIN — ONDANSETRON 4 MG: 2 INJECTION INTRAMUSCULAR; INTRAVENOUS at 03:47

## 2024-08-17 RX ADMIN — SODIUM CHLORIDE, SODIUM LACTATE, POTASSIUM CHLORIDE, AND CALCIUM CHLORIDE 1000 ML: .6; .31; .03; .02 INJECTION, SOLUTION INTRAVENOUS at 04:00

## 2024-08-17 RX ADMIN — ONDANSETRON 4 MG: 2 INJECTION INTRAMUSCULAR; INTRAVENOUS at 15:43

## 2024-08-17 RX ADMIN — ROCURONIUM BROMIDE 20 MG: 10 INJECTION, SOLUTION INTRAVENOUS at 15:29

## 2024-08-17 RX ADMIN — ROCURONIUM BROMIDE 50 MG: 10 INJECTION, SOLUTION INTRAVENOUS at 14:19

## 2024-08-17 RX ADMIN — SODIUM CHLORIDE, SODIUM LACTATE, POTASSIUM CHLORIDE, AND CALCIUM CHLORIDE 100 ML/HR: .6; .31; .03; .02 INJECTION, SOLUTION INTRAVENOUS at 17:52

## 2024-08-17 RX ADMIN — SODIUM CHLORIDE, SODIUM LACTATE, POTASSIUM CHLORIDE, AND CALCIUM CHLORIDE: .6; .31; .03; .02 INJECTION, SOLUTION INTRAVENOUS at 15:15

## 2024-08-17 RX ADMIN — SUGAMMADEX 400 MG: 100 INJECTION, SOLUTION INTRAVENOUS at 15:59

## 2024-08-17 RX ADMIN — LIDOCAINE HYDROCHLORIDE 100 MG: 20 INJECTION, SOLUTION EPIDURAL; INFILTRATION; INTRACAUDAL at 14:19

## 2024-08-17 RX ADMIN — MIDAZOLAM 2 MG: 1 INJECTION INTRAMUSCULAR; INTRAVENOUS at 14:18

## 2024-08-17 RX ADMIN — TRANEXAMIC ACID 1 G: 100 INJECTION, SOLUTION INTRAVENOUS at 14:25

## 2024-08-17 RX ADMIN — PRAVASTATIN SODIUM 20 MG: 20 TABLET ORAL at 17:52

## 2024-08-17 RX ADMIN — HYDROMORPHONE HYDROCHLORIDE 0.2 MG: 0.2 INJECTION, SOLUTION INTRAMUSCULAR; INTRAVENOUS; SUBCUTANEOUS at 06:44

## 2024-08-17 RX ADMIN — FAMOTIDINE 20 MG: 20 TABLET, FILM COATED ORAL at 09:43

## 2024-08-17 RX ADMIN — CEFAZOLIN 2000 MG: 1 INJECTION, POWDER, FOR SOLUTION INTRAMUSCULAR; INTRAVENOUS; PARENTERAL at 14:25

## 2024-08-17 RX ADMIN — HYDROMORPHONE HYDROCHLORIDE 0.5 MG: 1 INJECTION, SOLUTION INTRAMUSCULAR; INTRAVENOUS; SUBCUTANEOUS at 15:32

## 2024-08-17 RX ADMIN — CEFAZOLIN SODIUM 2000 MG: 2 SOLUTION INTRAVENOUS at 22:06

## 2024-08-17 RX ADMIN — HYDROMORPHONE HYDROCHLORIDE 1 MG: 1 INJECTION, SOLUTION INTRAMUSCULAR; INTRAVENOUS; SUBCUTANEOUS at 03:47

## 2024-08-17 RX ADMIN — OXYCODONE HYDROCHLORIDE 5 MG: 5 TABLET ORAL at 20:23

## 2024-08-17 RX ADMIN — DIPHENHYDRAMINE HYDROCHLORIDE 25 MG: 25 TABLET ORAL at 20:44

## 2024-08-17 RX ADMIN — FAMOTIDINE 20 MG: 20 TABLET, FILM COATED ORAL at 17:52

## 2024-08-17 RX ADMIN — HYDROMORPHONE HYDROCHLORIDE 0.5 MG: 1 INJECTION, SOLUTION INTRAMUSCULAR; INTRAVENOUS; SUBCUTANEOUS at 15:04

## 2024-08-17 NOTE — QUICK NOTE
Ms. Blair presents with fall resulting in a left femoral neck fracture.  Past medical history is notable for hypertension as well as diabetes mellitus.  He denies any chest pain, shortness of breath or difficulty breathing.  Is able to ambulate to her admission without any significant discomfort.  As of diabetes mellitus the patient does take Tresiba 30 units as well as Januvia.  She is also on oral semaglutide.    Patient is considered low to intermediate risk for cardiac and pulmonary complications.    Obtain preoperative EKG but given lack of symptoms per ACC and AHA guidelines can proceed to surgery without any additional intervention.    Continue amlodipine for blood pressure management    No objection to proceeding to surgery later today    BONDS, DO

## 2024-08-17 NOTE — PLAN OF CARE
Problem: PAIN - ADULT  Goal: Verbalizes/displays adequate comfort level or baseline comfort level  Description: Interventions:  - Encourage patient to monitor pain and request assistance  - Assess pain using appropriate pain scale  - Administer analgesics based on type and severity of pain and evaluate response  - Implement non-pharmacological measures as appropriate and evaluate response  - Consider cultural and social influences on pain and pain management  - Notify physician/advanced practitioner if interventions unsuccessful or patient reports new pain  Outcome: Progressing     Problem: Knowledge Deficit  Goal: Patient/family/caregiver demonstrates understanding of disease process, treatment plan, medications, and discharge instructions  Description: Complete learning assessment and assess knowledge base.  Interventions:  - Provide teaching at level of understanding  - Provide teaching via preferred learning methods  Outcome: Progressing     Problem: MUSCULOSKELETAL - ADULT  Goal: Maintain or return mobility to safest level of function  Description: INTERVENTIONS:  - Assess patient's ability to carry out ADLs; assess patient's baseline for ADL function and identify physical deficits which impact ability to perform ADLs (bathing, care of mouth/teeth, toileting, grooming, dressing, etc.)  - Assess/evaluate cause of self-care deficits   - Assess range of motion  - Assess patient's mobility  - Assess patient's need for assistive devices and provide as appropriate  - Encourage maximum independence but intervene and supervise when necessary  - Involve family in performance of ADLs  - Assess for home care needs following discharge   - Consider OT consult to assist with ADL evaluation and planning for discharge  - Provide patient education as appropriate  Outcome: Progressing  Goal: Maintain proper alignment of affected body part  Description: INTERVENTIONS:  - Support, maintain and protect limb and body alignment  -  Provide patient/ family with appropriate education  Outcome: Progressing

## 2024-08-17 NOTE — ANESTHESIA PREPROCEDURE EVALUATION
Procedure:  ARTHROPLASTY HIP TOTAL (Left: Hip)    Relevant Problems   CARDIO   (+) Primary hypertension      ENDO   (+) Type 2 diabetes mellitus with obesity  (HCC)      Behavioral Health   (+) Alcohol use      Orthopedic/Musculoskeletal   (+) Fracture of left hip (HCC)        Physical Exam    Airway    Mallampati score: I  TM Distance: >3 FB  Neck ROM: full     Dental       Cardiovascular  Cardiovascular exam normal    Pulmonary  Pulmonary exam normal     Other Findings  post-pubertal.      Anesthesia Plan  ASA Score- 2     Anesthesia Type- general with ASA Monitors.         Additional Monitors:     Airway Plan:            Plan Factors-Exercise tolerance (METS): >4 METS.    Chart reviewed.   Existing labs reviewed. Patient summary reviewed.    Patient is not a current smoker.      Obstructive sleep apnea risk education given perioperatively.        Induction- intravenous.    Postoperative Plan- Plan for postoperative opioid use.     Perioperative Resuscitation Plan - Level 1 - Full Code.       Informed Consent- Anesthetic plan and risks discussed with patient.  I personally reviewed this patient with the CRNA. Discussed and agreed on the Anesthesia Plan with the CRNA..

## 2024-08-17 NOTE — ASSESSMENT & PLAN NOTE
She notes she has not been taking medications at home  Previous medication is amlodipine 5 mg daily  Her morning BP is acceptable, suspect some elevations in setting of discomfort  Monitor perioperatively, IV hydralazine as needed

## 2024-08-17 NOTE — ASSESSMENT & PLAN NOTE
After mechanical falls in setting of alcohol intoxication, no head strike   Reviewed EMS report  X-ray displaced left hip femoral neck displaced fracture  Orthopedics is consulted, planning for total left hip replacement today  Leg is neurovascularly intact, continue NWB  PT OT postoperatively once cleared by orthopedics  DVT prophylaxis postoperatively  Start multimodal pain regimen.  Patient has no allergies to narcotics.  She is narcotic naïve, confirmed via PDMP  Maintain NPO until post op  See preoperative clearance note per attending provider

## 2024-08-17 NOTE — ED PROVIDER NOTES
History  Chief Complaint   Patient presents with    Fall     Pt reports multiple falls after couple glasses of wine, pt reports after first fall she was able to walk up the steps, the second fall occurred in the bathroom states she heard a crack on her left hip. Denies headstrike and thinners.      Pt is a 48-year-old female that presents via EMS for hip injury.  States she did drink 2 glasses of red wine tonight and then fell.  Patient was able to walk up the stairs but then fell again in her bathroom and was unable to walk.  She states that she felt a crack and pop in her left hip and was unable to move it and stand.  She denies hitting her head and has no pain anywhere else.      History provided by:  Patient and EMS personnel   used: No    Fall  Associated symptoms: no back pain, no chest pain, no headaches and no neck pain        Prior to Admission Medications   Prescriptions Last Dose Informant Patient Reported? Taking?   Semaglutide (Rybelsus) 7 MG TABS   No No   Sig: Take 1 tablet by mouth in the morning   Patient not taking: Reported on 3/29/2022    amLODIPine (NORVASC) 5 mg tablet   No No   Sig: Take 1 tablet (5 mg total) by mouth daily   Patient not taking: Reported on 3/29/2022    dextromethorphan-guaiFENesin (ROBITUSSIN DM)  mg/5 mL syrup   No No   Sig: Take 5 mL by mouth 3 (three) times a day as needed for cough   Patient not taking: Reported on 3/29/2022    famotidine (PEPCID) 20 mg tablet   No No   Sig: Take 1 tablet (20 mg total) by mouth 2 (two) times a day for 7 days   insulin degludec (Tresiba FlexTouch) 100 units/mL injection pen   No No   Sig: Inject 30 Units under the skin daily   Patient not taking: Reported on 3/29/2022    meclizine (ANTIVERT) 25 mg tablet   No No   Sig: Take 1 tablet (25 mg total) by mouth 3 (three) times a day as needed for dizziness   ondansetron (ZOFRAN-ODT) 4 mg disintegrating tablet   No No   Sig: Take 1 tablet (4 mg total) by mouth every 6  (six) hours as needed for nausea or vomiting   pravastatin (PRAVACHOL) 20 mg tablet   No No   Sig: Take 1 tablet (20 mg total) by mouth daily   Patient not taking: Reported on 3/29/2022    sitaGLIPtin (JANUVIA) 100 mg tablet   No No   Sig: Take 1 tablet (100 mg total) by mouth daily   Patient not taking: Reported on 3/29/2022       Facility-Administered Medications: None       Past Medical History:   Diagnosis Date    Diabetes mellitus (HCC)     Hypertension        Past Surgical History:   Procedure Laterality Date     SECTION      FINGER SURGERY      GASTRIC BYPASS LAPAROSCOPIC      in [facility], 2023       Family History   Problem Relation Age of Onset    Hypertension Mother     Diabetes Mother     Hypertension Father     Diabetes Father      I have reviewed and agree with the history as documented.    E-Cigarette/Vaping    E-Cigarette Use Never User      E-Cigarette/Vaping Substances     Social History     Tobacco Use    Smoking status: Never    Smokeless tobacco: Never   Vaping Use    Vaping status: Never Used   Substance Use Topics    Alcohol use: Never    Drug use: Never       Review of Systems   Respiratory:  Negative for shortness of breath.    Cardiovascular:  Negative for chest pain.   Musculoskeletal:  Positive for arthralgias, gait problem and joint swelling. Negative for back pain, neck pain and neck stiffness.   Skin:  Negative for color change, pallor, rash and wound.   Allergic/Immunologic: Negative for immunocompromised state.   Neurological:  Negative for dizziness, weakness, light-headedness, numbness and headaches.   All other systems reviewed and are negative.      Physical Exam  Physical Exam  Vitals and nursing note reviewed.   Constitutional:       General: She is in acute distress.      Appearance: She is well-developed. She is not ill-appearing, toxic-appearing or diaphoretic.   HENT:      Head: Normocephalic and atraumatic.      Right Ear: External ear normal.      Left Ear:  External ear normal.      Nose: Nose normal. No congestion or rhinorrhea.   Eyes:      General: No scleral icterus.        Right eye: No discharge.         Left eye: No discharge.      Conjunctiva/sclera: Conjunctivae normal.   Neck:      Trachea: No tracheal deviation.   Cardiovascular:      Rate and Rhythm: Normal rate.      Pulses: Normal pulses.           Radial pulses are 2+ on the right side and 2+ on the left side.        Femoral pulses are 2+ on the right side and 2+ on the left side.       Dorsalis pedis pulses are 2+ on the right side and 2+ on the left side.   Pulmonary:      Effort: Pulmonary effort is normal. No tachypnea, accessory muscle usage or respiratory distress.      Breath sounds: No stridor.   Abdominal:      General: There is no distension.      Palpations: Abdomen is soft.      Tenderness: There is no abdominal tenderness. There is no guarding.   Musculoskeletal:      Left hip: Tenderness and bony tenderness present. Decreased range of motion.      Left upper leg: Tenderness present. No swelling, edema, deformity or lacerations.      Left knee: No deformity or ecchymosis.      Left lower leg: Normal.      Left ankle: Normal.      Left foot: Normal.        Legs:    Skin:     General: Skin is warm and dry.   Neurological:      General: No focal deficit present.      Mental Status: She is alert and oriented to person, place, and time. She is not disoriented.   Psychiatric:         Mood and Affect: Mood and affect normal.         Speech: Speech normal.         Behavior: Behavior normal.         Vital Signs  ED Triage Vitals [08/17/24 0320]   Temperature Pulse Respirations Blood Pressure SpO2   98.6 °F (37 °C) 87 18 134/81 96 %      Temp Source Heart Rate Source Patient Position - Orthostatic VS BP Location FiO2 (%)   Oral Monitor Lying Right arm --      Pain Score       10 - Worst Possible Pain           Vitals:    08/17/24 0320   BP: 134/81   Pulse: 87   Patient Position - Orthostatic VS: Lying          Visual Acuity      ED Medications  Medications   lactated ringers bolus 1,000 mL (1,000 mL Intravenous New Bag 8/17/24 0400)   tranexamic Acid 800 mg in sodium chloride 0.9 % 100 mL IVPB (has no administration in time range)   HYDROmorphone (DILAUDID) injection 1 mg (1 mg Intravenous Given 8/17/24 0347)   ondansetron (ZOFRAN) injection 4 mg (4 mg Intravenous Given 8/17/24 0347)       Diagnostic Studies  Results Reviewed       Procedure Component Value Units Date/Time    Comprehensive metabolic panel [[id number]]  (Abnormal) Collected: 08/17/24 0358    Lab Status: Final result Specimen: Blood from Arm, Left Updated: 08/17/24 0423     Sodium 138 mmol/L      Potassium 4.3 mmol/L      Chloride 107 mmol/L      CO2 22 mmol/L      ANION GAP 9 mmol/L      BUN 28 mg/dL      Creatinine 0.99 mg/dL      Glucose 202 mg/dL      Calcium 8.8 mg/dL      AST 18 U/L      ALT 16 U/L      Alkaline Phosphatase 65 U/L      Total Protein 6.9 g/dL      Albumin 3.7 g/dL      Total Bilirubin 0.45 mg/dL      eGFR 67 ml/min/1.73sq m     Narrative:      National Kid[employer] Foundation guidelines for Chronic Kidney Disease (CKD):     Stage 1 with normal or high GFR (GFR > 90 mL/min/1.73 square meters)    Stage 2 Mild CKD (GFR = 60-89 mL/min/1.73 square meters)    Stage 3A Moderate CKD (GFR = 45-59 mL/min/1.73 square meters)    Stage 3B Moderate CKD (GFR = 30-44 mL/min/1.73 square meters)    Stage 4 Severe CKD (GFR = 15-29 mL/min/1.73 square meters)    Stage 5 End Stage CKD (GFR <15 mL/min/1.73 square meters)  Note: GFR calculation is accurate only with a steady state creatinine    Protime-INR [[id number]]  (Normal) Collected: 08/17/24 0358    Lab Status: Final result Specimen: Blood from Arm, Left Updated: 08/17/24 0423     Protime 13.5 seconds      INR 1.01    Narrative:      INR Therapeutic Range    Indication                                             INR Range      Atrial Fibrillation                                                2.0-3.0  Hypercoagulable State                                    2.0.2.3  Left Ventricular Asist Device                            2.0-3.0  Mechanical Heart Valve                                  -    Aortic(with afib, MI, embolism, HF, LA enlargement,    and/or coagulopathy)                                     2.0-3.0 (2.5-3.5)     Mitral                                                             2.5-3.5  Prosthetic/Bioprosthetic Heart Valve               2.0-3.0  Venous thromboembolism (VTE: VT, PE        2.0-3.0    APTT [[id number]]  (Normal) Collected: 08/17/24 0358    Lab Status: Final result Specimen: Blood from Arm, Left Updated: 08/17/24 0423     PTT 29 seconds     CBC and differential [[id number]]  (Abnormal) Collected: 08/17/24 0358    Lab Status: Final result Specimen: Blood from Arm, Left Updated: 08/17/24 0410     WBC 7.89 Thousand/uL      RBC 4.40 Million/uL      Hemoglobin 13.3 g/dL      Hematocrit 41.9 %      MCV 95 fL      MCH 30.2 pg      MCHC 31.7 g/dL      RDW 12.5 %      MPV 11.5 fL      Platelets 233 Thousands/uL      nRBC 0 /100 WBCs      Segmented % 58 %      Immature Grans % 1 %      Lymphocytes % 37 %      Monocytes % 3 %      Eosinophils Relative 1 %      Basophils Relative 0 %      Absolute Neutrophils 4.59 Thousands/µL      Absolute Immature Grans 0.05 Thousand/uL      Absolute Lymphocytes 2.90 Thousands/µL      Absolute Monocytes 0.27 Thousand/µL      Eosinophils Absolute 0.05 Thousand/µL      Basophils Absolute 0.03 Thousands/µL                    XR hip/pelv 2-3 vws left   ED Interpretation by Riaz Jimenes Jr., DO (08/17 0432)   On my interpretation:  Left intertrochanteric displaced hip fracture      XR chest 1 view portable   ED Interpretation by Riaz Jimenes Jr., DO (08/17 [id number])   The CXR was ordered by myself and interpreted by me independently.  On my read, it appears without acute abnormalities:  - The  cardiomediastinal  silhouette  is  unremarkable.    - The  lungs   are  clear.  - No  pleural  effusions.  - No  pneumothorax.  - The  pulmonary  vasculature  is  within  normal  limits.    - The  trachea  is  midline.    - Bony  thorax  is  unremarkable.                           Procedures  CriticalCare Time    Date/Time: 8/17/2024 4:38 AM    Performed by: Riaz Jimenes Jr., DO  Authorized by: Riaz Jimenes Jr., DO    Critical care provider statement:     Critical care time (minutes):  30    Critical care time was exclusive of:  Separately billable procedures and treating other patients and teaching time    Critical care was necessary to treat or prevent imminent or life-threatening deterioration of the following conditions:  Trauma    Critical care was time spent personally by me on the following activities:  Blood draw for specimens, obtaining history from patient or surrogate, development of treatment plan with patient or surrogate, discussions with consultants, evaluation of patient's response to treatment, examination of patient, interpretation of cardiac output measurements, ordering and performing treatments and interventions, ordering and review of laboratory studies, ordering and review of radiographic studies, review of old charts and re-evaluation of patient's condition    I assumed direction of critical care for this patient from another provider in my specialty: no             ED Course  ED Course as of 08/17/24 0438   Sat Aug 17, 2024   0428 Comprehensive metabolic panel(!)  Elevated blood sugar but no secondary metabolic disturbance   0428 CBC and differential(!)  Normal                                               Medical Decision Making  On exam the patient does have severe tenderness with light palpation of her left hip.  Leg is shortened and externally rotated which is concerning for fracture.  Pelvis appears stable and her vital signs are normal.  She has no outward signs of injury to her head, chest or abdomen.  She has no neck or back pain.  Will  start with preop labs, imaging and control pain with IV Dilaudid.  Patient does have some abrasions to her knees that appear in different stages of healing.  Her tetanus is up-to-date.    4:37 AM  X-ray confirms a left intertrochanteric displaced hip fracture.  Patient is neurovascularly intact at this time.  Will start on TXA, 10 mg/kg per protocol, continue with IV pain medications and admit to internal medicine.    Amount and/or Complexity of Data Reviewed  Labs: ordered. Decision-making details documented in ED Course.  Radiology: ordered and independent interpretation performed.    Risk  Prescription drug management.  Decision regarding hospitalization.                 Disposition  Final diagnoses:   Closed intertrochanteric fracture of hip, left, initial encounter (HCC)     Time reflects when diagnosis was documented in both MDM as applicable and the Disposition within this note       Time User Action Codes Description Comment    8/17/2024  4:33 AM Riaz Jimenes Add [S72.142A] Closed intertrochanteric fracture of hip, left, initial encounter (HCC)           ED Disposition       ED Disposition   Admit    Condition   Stable    Date/Time   Sat Aug 17, 2024  4:33 AM    Comment   Case was discussed with SHANE and the patient's admission status was agreed to be Admission Status: inpatient status to the service of Dr. Gaspar .               Follow-up Information    None         Patient's Medications   Discharge Prescriptions    No medications on file       No discharge procedures on file.    PDMP Review       None            ED Provider  Electronically Signed by             Riza Jimenes Jr.,   08/17/24 0438

## 2024-08-17 NOTE — ASSESSMENT & PLAN NOTE
Patient reports 2 glasses's of wine PTA for her and her SO anniversary  Denies daily drinking, history of alcohol abuse/withdrawal  Will start MVI, thiamine, folate  Monitor for withdrawal symptoms

## 2024-08-17 NOTE — OP NOTE
OPERATIVE REPORT  PATIENT NAME: Willa Blair  : 1975  MRN: [id number]  Pt Location:  [facility] OR ROOM 01    Surgery Date: 2024    Surgeons and Role:     * James Godfrey MD - Primary     * Leann Castro PA-C - Assisting     Preop Diagnosis:  Closed displaced fracture of left femoral neck (HCC) [S72.002A]    Post-Op Diagnosis Codes:     * Closed displaced fracture of left femoral neck (HCC) [S72.002A]    Procedure(s):  Left - ARTHROPLASTY HIP TOTAL    Specimens:  ID Type Source Tests Collected by Time Destination   1 : femoral head Tissue Joint, Left Hip TISSUE EXAM James Godfrey MD 2024 1552        Estimated Blood Loss:   Minimal    Drains:  * No LDAs found *    Anesthesia Type:   General     Operative Indications:  Closed displaced fracture of left femoral neck (HCC) [S72.002A]    Prosthesis:  Implant Name Type Inv. Item Serial No.  Lot No. LRB No. Used Action   SHELL ACETABULAR 48MM POROUS SOLID LCK RING PINNACLE - [id number]  SHELL ACETABULAR 48MM POROUS SOLID LCK RING PINNACLE  DEPUY [id number] Left 1 Implanted   SCREW CHRIS 6.5 X 30MM SLF TAP PINNACLE - [id number]  SCREW CHRIS 6.5 X 30MM SLF TAP PINNACLE  DEPUY [id number] Left 1 Implanted   LINER ACTB ULT LNK PE 32 X 48MM NEUTRAL ALTRX - [id number]  LINER ACTB ULT LNK PE 32 X 48MM NEUTRAL ALTRX  DEPUY [id number] Left 1 Implanted   STEM FEM SZ 4 TAPER CMNTLS HI COLLAR ACTIS - [id number]  STEM FEM SZ 4 TAPER CMNTLS HI COLLAR ACTIS  DEPUY [id number] Left 1 Implanted   HEAD FEMORAL 12/14 TPR 32MM +1MM BIOLOX ARTICULEZE - [id number]  HEAD FEMORAL 12/14 TPR 32MM +1MM BIOLOX ARTICULEZE  DEPUY [id number] Left 1 Implanted       Indications: Willa Blair is a 48 y.o. years old female diagnosed with left hip displaced femoral neck fracture. Patient failed conservative treatments and elected to proceed with surgical intervention. The risks and complications are discussed with the patient.The patient consented to the procedure.    Hip Approach:  Posterior    Procedure:  Patient was brought into the OR and anethetized with general anesthesia without any complications. Patient was placed in lateral decubitus position with left up. Patient's left hip and leg were prepped and draped in sterile fashion. A time out was called and identified the left hip was the operating site.    A posterior-lateral incision was made over the left hip. Dissection was then carried down to the Gluteus catracho and fascia dora. The short external rotators was detached from the posterior aspect of the greater trochanter. Bleeders were controlled with electro-Bovie. Care was make sure the Siatic nerve was protected and retracted out of way. An arthrotomy was then done in the posterior capsule in a T-fashion. The hip was then dislocated posteriorly. Femoral neck osteotomy was done about 2 cm proximal to the lesser trochanter. The hip was then repositioned to exposed the acetabulum. The soft tissue in the Acetabular fossa was then cleared. Sequential acetabular reamer was used to enlarge the acetabulum to accommendate a 48 trial, which appeared to have good anterior, posterior, superior, and inferior fit.  The actual prosthesis was then placed in the acetabulum with press fit mechanism.  Care was made sure that the prosthesis its position in a 45 degree  of inclination and 15-20 degree of anteversion. The acetabulum cup was further fixed with a screw. A trial liner was then placed in the acetabulum component.    The attention was then turned to the proximal femur.  The leg was then internally rotated to expose the proximal femur.  A sequential broach system was used to enlarge the femoral canal.  Size 4 broach appeared to be fitting the femoral canal well.  Care was made sure while broaching the femoral canal about 15 degree of anteversion was created.  A size 32 femoral head trial with hi-offset +1 mm neck length was used for trial.  The trial was reduced and range of motion was carried  out.  Patient appeared to have a good stability with the hip in full flexion, internal rotation to more than 50 degree, and adduction of the hip.  The femoral trial and acetabulum liner trial were then removed. The actual acetabular liner was then inserted with a taper lock mechanism into the acetabulum component.  The femoral prosthesis was also inserted with  Press-fit mechanism.  Again, about 15 of anteversion was placed with the femoral component.  The femoral head component was placed onto the femoral stem with taper lock mechanism.  The prosthesis was reduced and range of motion was carried out.  The hip appeared to be stable just like the trial. Wound was thoroughly irrigated with Irricept for 1 minute and then irrigated with NS.  20 cc of Duramorph was injected around the joint capsule. Aquamantys was used to treat the soft tissue around the joint.  The posterior capsule was repaired with #1 Vicryl in an interrupted fashion.  The short external rotator was reattached to the greater trochanter posteriorly.  The gluteus catracho and fascia dora were repair with a #1 Vicryl.  0 and 2-0 Vicryl were used to close the subcutaneous tissue. Incision was painted with betadine and Stratafix was used to close the skin. Steri-Strips was then applied. Sterile bulky dressing was applied over the incision.    The patient tolerated the procedure well without any complications. An abduction pillow was placed between patient's legs. Patient was transferred to recovery room for post-op care. The family was contacted.    There was no qualified resident available to assist.    Mrs. Castro was required in the OR. An assistant was necessary for the surgery given the complexity of the operation, and the need for a skilled surgical assistant for proper retraction of critical soft tissues including ligaments, tendons, and neurovascular structures, as well as adjacent bony structures.  Precise retractor placement and maintenance of  precise retraction is critical, as is skilled positioning of the limb during the different parts of the procedure.    Complications:   None    Patient Disposition:  PACU     SIGNATURE: James Godfrey MD  DATE: August 17, 2024  TIME: 4:09 PM

## 2024-08-17 NOTE — DISCHARGE INSTR - AVS FIRST PAGE
Dr. James Godfrey MD  Department of Orthopaedic Surgery      [address], [address]  [place], [facility] [phone]                                        PATIENT INSTRUCTIONS AFTER TOTAL HIP REPLACEMENT/REVISION    Diet: You may resume your normal diet.  It is important to maintain a healthy, balanced diet.  Include plenty of fluids, as pain medicines tend to cause constipation.    Medications  Pain Medications: A prescription for pain medication has been provided to upon your hospital discharge.  Your goal is reduce your pain medication gradually over the next 4-6 weeks. Take your pain medicine with food to avoid stomach discomfort.  Please allow at least 24-48 hours (Monday through Friday) from the time of your request to the time a will need the prescription.  Constipation:   To avoid constipation, take an over-the-counter stool softener (i.e. docusate sodium) twice daily such as docusate sodium or Senna S twice daily and Miralax laxative once daily while on pain medication. If the combination does not alleviate your symptoms after 3 days, use a rectal suppository such as dulcolax.      Blood Clot Prevention as below:   Recommend Lovenox 40 mg SQ daily or Eliquis 2.5 mg BID x 4 weeks                   Activity  Rest Periods: Gradually increase your activity on a daily basis.  The amount of time you spend out of bed and the number and distance of your walks should gradually increase each day.  Between activities such as walking, meals, exercises, etc., take a rest period for approximately 1 hour in the morning, afternoon and evening. Limit sitting to 1 hour intervals for the next 4 - 6 weeks.  Weight-bearing:  You may put as much weight as is comfortable on your operative leg with activity.  Use a walker or crutches while walking for at least 3 weeks.  At that time, it is advised to use a cane until you are able to apply full weight to the operated leg.  Impact  Loading: Low-impact activities such as golf, stationary bicycling and slow dancing may begin in 6 weeks, while swimming is allowed at 3 weeks after surgery.  All activities involving quick starts and stops, or impact loading, such as tennis, should be avoided to lower your risk of early loosening of the prosthesis.  Bathing:  Your dressing is waterproof and can get wet in the shower.  Driving:  You should not drive until approximately 4 weeks after surgery.  While you are traveling as a passenger for the first 4 weeks following surgery, it is advised that you get out of the car at least hourly and take a short walk.  Returning to work: The decision to return to work will be based on the type of work you do, your physical stamina, and whether you have other medical conditions.  We recommend that you avoid making any major changes in your work or shelter plans until your recovery is complete.  Dislocation Precautions No crossing your legs for 3 months.     Wound Care  Your incision is covered with a Mepilex dressing. It is waterproof. You can leave it on until seen in the office.  Do NOT scrub or rub the incision.  No creams or ointments on the incision for 4 weeks.   Your incision may be warm, itchy, and slightly red for several weeks after surgery.  Excessive redness, soreness, or drainage from the incision area should be reported to our office.     Common Problems  Leg & ankle swelling: You may have some swelling in your operated leg that should gradually decrease.  If swelling occurs, lie down, elevate your legs, and rest.  Pain:  Pain may be a result of over-activity.  When you are in pain, sit or lie down, elevated your legs, and rest.  If the pain does not subside, take the pain medication prescribed for you.  Pain is a protective mechanism that helps to prevent over-usage and should not be ignored.    Return Appointments:    You should have a scheduled appointment for followup.  If you do not already have a  followup appointment scheduled, please call the office at [phone] to schedule an appointment.    Call our office if you have:  Temperature of 101o or higher  Drainage from your incision  Increasing redness around your incision  Increasing  pain around the incision, unrelieved by pain medication  Excessive calf or thigh pain & swelling that does not go away with elevation and rest.     ANTIBIOTIC PROPHYLAXIS AFTER TOTAL JOINT REPLACEMENT  For protection against the remote possibility of blood-borne bacteria, carried from the mouth during a dental procedure, creating an infection in a total joint replacement, a combined task force of American Academy of Orthopaedic Surgeons and the American Dental Association has made the following guideline recommendations:    Following total joint replacement, all patients are advised to take an antibiotic regimen for the following dental procedures FOR FIRST YEAR AFTER SURGERY  Prophylactic cleaning of teeth or implants  Intraligamentary local anesthetic injections  Periodontal procedures  Root canal procedures  Dental extractions  Dental implant procedures  Implantation of avulsed teeth  Initial placement of orthodontic bands    The recommended antibiotic regimen (if not allergic to penicillin) is:  Amoxicillin, Cephalexin (e.g. Keflex), or Cephradine two (2.0) grams orally one (1) hour prior to the dental procedure.    For patients with a penicillin allergy, the recommended antibiotic is:  Clindamycin (e.g. Cleocin) 600 mg orally one hour prior to the dental procedure.    Antibiotic prophylaxis is not warranted for dental procedures for patients with previously placed orthopedic pins, plates or screws.    The above recommendations are considered minimum guidelines.  Your doctor and/or dentist are ultimately responsible for making individual treatment recommendations to you based on their clinical judgment.       Home medications     You will be started on metformin.  This  is 1 tablet daily.  This may cause loose stools.  If this happens over 3 weeks let a family doctor know.  This is for your diabetes    You do not need to take any cholesterol pill or blood pressure pill.    You were sent with oxycodone and Robaxin.  Oxycodone is a pain medication.  Robaxin is a muscle relaxer.  You can take them as needed with the prescription instructions.  They both can be sedating so avoid driving and alcohol with these.    You were sent with Eliquis.  This is a blood thinner to prevent a clot in your leg after the surgery.  Take 1 pill in the morning and 1 at night.  If you notice increased bruising or signs of bleeding let somebody know or return to the emergency room    You were sent with Colace and Senokot.  Please take these as prescribed.  This will help you not be constipated after surgery.  Make sure you stay well-hydrated    You were sent with oral B12 supplement which is a vitamin.  This will be important as you had bariatric surgery    You are given referrals to a new family doctor in [facility] if you do not want to return to your old doctor.  Please follow-up with orthopedics in 2 weeks.  If you notice anything abnormal as stated above or start to become dizzy, lightheaded return to the ED.

## 2024-08-17 NOTE — ASSESSMENT & PLAN NOTE
X-ray demonstrates left intertrochanteric displaced hip fracture.  Without consulted.  TXA per protocol.  Ortho consulted. Npo. Pain control standing Tylenol, as needed Dilaudid.

## 2024-08-17 NOTE — PHYSICAL THERAPY NOTE
PHYSICAL THERAPY NOTE          Patient Name: Willa Blair  Today's Date: 8/17/2024 08/17/24 0742   PT Last Visit   PT Visit Date 08/17/24   Note Type   Note type Cancelled Session;Evaluation   Cancel Reasons Other   Additional Comments PT consult received. Chart reviewed. Pt admitted for acute hip fx. Ortho consult pending. Will defer PT eval at this time until cleared by Ortho.     [place]uba Ashton

## 2024-08-17 NOTE — H&P
"[facility]  H&P  Name: Willa Blair 48 y.o. female I MRN: [id number]  Unit/Bed#: ED-03 I Date of Admission: 8/17/2024   Date of Service: 8/17/2024 I Hospital Day: 0      Assessment & Plan   * Fracture of left hip (HCC)  Assessment & Plan  X-ray demonstrates left intertrochanteric displaced hip fracture.  Without consulted.  TXA per protocol.  Ortho consulted. Npo. Pain control standing Tylenol, as needed Dilaudid.    Primary hypertension  Assessment & Plan  Continue amlodipine    Type 2 diabetes mellitus with obesity  (HCC)  Assessment & Plan  No results found for: \"HGBA1C\"    No results for input(s): \"POCGLU\" in the last 72 hours.    Blood Sugar Average: Last 72 hrs:    Scale insulin  Blood glucose goal 1 40-1 80           VTE Pharmacologic Prophylaxis: VTE Score: 8 High Risk (Score >/= 5) - Pharmacological DVT Prophylaxis Ordered: Holding pending surgical plan. Sequential Compression Devices Ordered.  Code Status: Prior full  Discussion with family: Patient declined call to .     Anticipated Length of Stay: Patient will be admitted on an observation basis with an anticipated length of stay of less than 2 midnights secondary to hip fracture.    Total Time Spent on Date of Encounter in care of patient: 40 mins. This time was spent on one or more of the following: performing physical exam; counseling and coordination of care; obtaining or reviewing history; documenting in the medical record; reviewing/ordering tests, medications or procedures; communicating with other healthcare professionals and discussing with patient's family/caregivers.    Chief Complaint: Hip fracture, fall    History of Present Illness:  Willa Blair is a 48 y.o. female with a PMH of hypertension, diabetes who presents with mechanical fall and to have left hip fracture.  She was in her usual state of health, states that she had 2 glasses of wine when she had a mechanical fall.  She " was able to go up the stairs and into the bathroom when she felt/heard a loud crack and was unable to move her left leg and stand.  Denies head strike.  Denies loss of consciousness.  She reports 10 out of 10 pain.  She is not on blood thinners.    Review of Systems:  Review of Systems    Past Medical and Surgical History:   Past Medical History:   Diagnosis Date    Diabetes mellitus (HCC)     Hypertension        Past Surgical History:   Procedure Laterality Date     SECTION      FINGER SURGERY      GASTRIC BYPASS LAPAROSCOPIC      in [facility], 2023       Meds/Allergies:  Prior to Admission medications    Medication Sig Start Date End Date Taking? Authorizing Provider   amLODIPine (NORVASC) 5 mg tablet Take 1 tablet (5 mg total) by mouth daily  Patient not taking: Reported on 3/29/2022  1/1/22   Abhishek Sandoval DO   dextromethorphan-guaiFENesin (ROBITUSSIN DM)  mg/5 mL syrup Take 5 mL by mouth 3 (three) times a day as needed for cough  Patient not taking: Reported on 3/29/2022  1/1/22   Abhishek Sandoval DO   famotidine (PEPCID) 20 mg tablet Take 1 tablet (20 mg total) by mouth 2 (two) times a day for 7 days 1/10/24 1/17/24  Gil Aguilar PA-C   insulin degludec (Tresiba FlexTouch) 100 units/mL injection pen Inject 30 Units under the skin daily  Patient not taking: Reported on 3/29/2022  1/1/22   Abhishek Sandoval DO   meclizine (ANTIVERT) 25 mg tablet Take 1 tablet (25 mg total) by mouth 3 (three) times a day as needed for dizziness 1/10/24   Gil Aguilar PA-C   ondansetron (ZOFRAN-ODT) 4 mg disintegrating tablet Take 1 tablet (4 mg total) by mouth every 6 (six) hours as needed for nausea or vomiting 1/10/24   Gil Aguilar PA-C   pravastatin (PRAVACHOL) 20 mg tablet Take 1 tablet (20 mg total) by mouth daily  Patient not taking: Reported on 3/29/2022  1/1/22   Abhishek Sandoval DO   Semaglutide (Rybelsus) 7 MG TABS Take 1 tablet by mouth in the morning  Patient not taking:  Reported on 3/29/2022  1/1/22   Abhishek Sandoval DO   sitaGLIPtin (JANUVIA) 100 mg tablet Take 1 tablet (100 mg total) by mouth daily  Patient not taking: Reported on 3/29/2022  1/1/22   Abhishek Sandoval DO     I have reveiwed home medications using records provided by ED.    Allergies:   Allergies   Allergen Reactions    Asa [Aspirin] Other (See Comments)     Throat closes       Social History:  Marital Status: Single   Occupation: Unknown  Patient Pre-hospital Living Situation: Home  Patient Pre-hospital Level of Mobility: walks  Patient Pre-hospital Diet Restrictions: None  Substance Use History:   Social History     Substance and Sexual Activity   Alcohol Use Never     Social History     Tobacco Use   Smoking Status Never   Smokeless Tobacco Never     Social History     Substance and Sexual Activity   Drug Use Never       Family History:  Family History   Problem Relation Age of Onset    Hypertension Mother     Diabetes Mother     Hypertension Father     Diabetes Father        Physical Exam:     Vitals:   Blood Pressure: 134/81 (08/17/24 0320)  Pulse: 87 (08/17/24 0320)  Temperature: 98.6 °F (37 °C) (08/17/24 0320)  Temp Source: Oral (08/17/24 0320)  Respirations: 18 (08/17/24 0320)  Weight - Scale: 79.9 kg (176 lb 2.4 oz) (08/17/24 0320)  SpO2: 96 % (08/17/24 0320)    Physical Exam  Vitals and nursing note reviewed.   Constitutional:       General: She is in acute distress.      Appearance: She is well-developed. She is not ill-appearing.   HENT:      Head: Normocephalic and atraumatic.   Eyes:      General: No scleral icterus.     Conjunctiva/sclera: Conjunctivae normal.   Cardiovascular:      Rate and Rhythm: Normal rate and regular rhythm.      Pulses: Normal pulses.      Heart sounds: Normal heart sounds. No murmur heard.  Pulmonary:      Effort: Pulmonary effort is normal. No respiratory distress.      Breath sounds: Normal breath sounds. No wheezing or rales.   Abdominal:      General: Abdomen is flat.  "There is no distension.      Palpations: Abdomen is soft.      Tenderness: There is no abdominal tenderness.   Musculoskeletal:         General: No swelling.      Cervical back: Neck supple.      Comments: Tender left hip, no swelling noted   Skin:     General: Skin is warm and dry.      Capillary Refill: Capillary refill takes less than 2 seconds.   Neurological:      Mental Status: She is alert and oriented to person, place, and time.   Psychiatric:         Mood and Affect: Mood normal.          Additional Data:     Lab Results:  Results from last 7 days   Lab Units 08/17/24  0358   WBC Thousand/uL 7.89   HEMOGLOBIN g/dL 13.3   HEMATOCRIT % 41.9   PLATELETS Thousands/uL 233   SEGS PCT % 58   LYMPHO PCT % 37   MONO PCT % 3*   EOS PCT % 1     Results from last 7 days   Lab Units 08/17/24  0358   SODIUM mmol/L 138   POTASSIUM mmol/L 4.3   CHLORIDE mmol/L 107   CO2 mmol/L 22   BUN mg/dL 28*   CREATININE mg/dL 0.99   ANION GAP mmol/L 9   CALCIUM mg/dL 8.8   ALBUMIN g/dL 3.7   TOTAL BILIRUBIN mg/dL 0.45   ALK PHOS U/L 65   ALT U/L 16   AST U/L 18   GLUCOSE RANDOM mg/dL 202*     Results from last 7 days   Lab Units 08/17/24  0358   INR  1.01         No results found for: \"HGBA1C\"        Lines/Drains:  Invasive Devices       Peripheral Intravenous Line  Duration             Peripheral IV 08/17/24 Left;Ventral (anterior) Forearm <1 day                        Imaging: Reviewed radiology reports from this admission including: Hip x-ray  XR hip/pelv 2-3 vws left   ED Interpretation by Riaz Jimenes Jr., DO (08/17 1513)   On my interpretation:  Left intertrochanteric displaced hip fracture      XR chest 1 view portable   ED Interpretation by Riaz Jimenes Jr., DO (08/17 2279)   The CXR was ordered by myself and interpreted by me independently.  On my read, it appears without acute abnormalities:  - The  cardiomediastinal  silhouette  is  unremarkable.    - The  lungs  are  clear.  - No  pleural  effusions.  - No  " pneumothorax.  - The  pulmonary  vasculature  is  within  normal  limits.    - The  trachea  is  midline.    - Bony  thorax  is  unremarkable.                    EKG and Other Studies Reviewed on Admission:   EKG: NSR. HR 70.    ** Please Note: This note has been constructed using a voice recognition system. **

## 2024-08-17 NOTE — PROGRESS NOTES
[facility]  Progress Note  Name: Willa Blair I  MRN: [id number]  Unit/Bed#: E2 -01 I Date of Admission: 8/17/2024   Date of Service: 8/17/2024 I Hospital Day: 0    Assessment & Plan   * Fracture of left hip (HCC)  Assessment & Plan  After mechanical falls in setting of alcohol intoxication, no head strike   Reviewed EMS report  X-ray displaced left hip femoral neck displaced fracture  Orthopedics is consulted, planning for total left hip replacement today  Leg is neurovascularly intact, continue NWB  PT OT postoperatively once cleared by orthopedics  DVT prophylaxis postoperatively  Start multimodal pain regimen.  Patient has no allergies to narcotics.  She is narcotic naïve, confirmed via PDMP  Maintain NPO until post op  See preoperative clearance note per attending provider    Alcohol use  Assessment & Plan  Patient reports 2 glasses's of wine PTA for her and her SO anniversary  Denies daily drinking, history of alcohol abuse/withdrawal  Will start MVI, thiamine, folate  Monitor for withdrawal symptoms    Primary hypertension  Assessment & Plan  She notes she has not been taking medications at home  Previous medication is amlodipine 5 mg daily  Her morning BP is acceptable, suspect some elevations in setting of discomfort  Monitor perioperatively, IV hydralazine as needed    Type 2 diabetes mellitus with obesity  (HCC)  Assessment & Plan  She notes she is not taking diabetic medications.  Previous history notes patient being on Tresiba 30 units nightly as well as Januvia and oral semaglutide  Her sugars here are currently well-controlled  Update A1c  Continue with sign scale insulin and Accu-Cheks, diabetic diet once tolerating p.o.  Monitor sugars and titrate insulin based off trends         VTE Pharmacologic Prophylaxis: VTE Score: 8 High Risk (Score >/= 5) - Pharmacological DVT Prophylaxis Ordered: enoxaparin (Lovenox). Sequential Compression Devices Ordered. Hold  today    Mobility:   JH-HLM Achieved: 2: Bed activities/Dependent transfer  JH-HLM Goal NOT achieved. Continue with multidisciplinary rounding and encourage appropriate mobility to improve upon JH-HLM goals.    Patient Centered Rounds: I performed bedside rounds with nursing staff today.   Discussions with Specialists or Other Care Team Provider: Orthopedics, Attending    Education and Discussions with Family / Patient: Updated  (significant other) at bedside.    Total Time Spent on Date of Encounter in care of patient: 30 mins. This time was spent on one or more of the following: performing physical exam; counseling and coordination of care; obtaining or reviewing history; documenting in the medical record; reviewing/ordering tests, medications or procedures; communicating with other healthcare professionals and discussing with patient's family/caregivers.    Current Length of Stay: 0 day(s)  Current Patient Status: Observation   Certification Statement: The patient will continue to require additional inpatient hospital stay due to surgery  Discharge Plan: Anticipate discharge in 48-72 hrs to discharge location to be determined pending rehab evaluations.    Code Status: Level 1 - Full Code    Subjective:   Patient seen and examined. Having pain in her left hip. Denies numbness tingling.     Denies chest pain, shortness of breath or abdominal pain.     She notes drinking for an anniversary, besides this does not daily drink.    Only allergy to medication is aspirin.     No chest pain on ambulation.      Objective:     Vitals:   Temp (24hrs), Av.3 °F (36.8 °C), Min:98 °F (36.7 °C), Max:98.6 °F (37 °C)    Temp:  [98 °F (36.7 °C)-98.6 °F (37 °C)] 98.2 °F (36.8 °C)  HR:  [77-91] 77  Resp:  [18-22] 22  BP: (134-176)/(75-81) 156/76  SpO2:  [93 %-96 %] 93 %  Body mass index is 32.51 kg/m².     Input and Output Summary (last 24 hours):     Intake/Output Summary (Last 24 hours) at 2024 1141  Last data  filed at 8/17/2024 0732  Gross per 24 hour   Intake 1100 ml   Output 400 ml   Net 700 ml       Physical Exam:   Physical Exam  Vitals and nursing note reviewed.   Constitutional:       General: She is not in acute distress.     Appearance: She is well-developed. She is not toxic-appearing.   Cardiovascular:      Rate and Rhythm: Normal rate and regular rhythm.      Heart sounds: No murmur heard.  Pulmonary:      Effort: Pulmonary effort is normal. No respiratory distress.      Breath sounds: Normal breath sounds.   Abdominal:      General: Bowel sounds are normal.      Palpations: Abdomen is soft.      Tenderness: There is no abdominal tenderness.   Musculoskeletal:         General: Tenderness (left hip) present.      Right lower leg: No edema.      Left lower leg: No edema.      Comments: Lower extremities nontender. DP pulses intact. Light sensation intact   Skin:     General: Skin is warm and dry.   Neurological:      Mental Status: She is alert and oriented to person, place, and time. Mental status is at baseline.      Motor: No tremor or abnormal muscle tone.   Psychiatric:         Mood and Affect: Mood normal.         Behavior: Behavior normal.        Additional Data:     Labs:  Results from last 7 days   Lab Units 08/17/24  0938 08/17/24  0358   WBC Thousand/uL  --  7.89   HEMOGLOBIN g/dL  --  13.3   HEMATOCRIT %  --  41.9   PLATELETS Thousands/uL 211 233   SEGS PCT %  --  58   LYMPHO PCT %  --  37   MONO PCT %  --  3*   EOS PCT %  --  1     Results from last 7 days   Lab Units 08/17/24  0358   SODIUM mmol/L 138   POTASSIUM mmol/L 4.3   CHLORIDE mmol/L 107   CO2 mmol/L 22   BUN mg/dL 28*   CREATININE mg/dL 0.99   ANION GAP mmol/L 9   CALCIUM mg/dL 8.8   ALBUMIN g/dL 3.7   TOTAL BILIRUBIN mg/dL 0.45   ALK PHOS U/L 65   ALT U/L 16   AST U/L 18   GLUCOSE RANDOM mg/dL 202*     Results from last 7 days   Lab Units 08/17/24  0358   INR  1.01     Results from last 7 days   Lab Units 08/17/24  1118 08/17/24  0729    POC GLUCOSE mg/dl 122 132     Lines/Drains:  Invasive Devices       Peripheral Intravenous Line  Duration             Peripheral IV 08/17/24 Left;Ventral (anterior) Forearm <1 day                  Imaging: Reviewed radiology reports from this admission including: chest xray and xray(s)    Recent Cultures (last 7 days):     Last 24 Hours Medication List:   Current Facility-Administered Medications   Medication Dose Route Frequency Provider Last Rate    acetaminophen  975 mg Oral Q8H [facility] Devonte Louise PA-C      amLODIPine  5 mg Oral Daily Joel Gerardo Nash, DO      cefazolin  2,000 mg Intravenous Once James Godfrey MD      famotidine  20 mg Oral BID Joel Gerardo Nash, DO      folic acid  1 mg Oral Daily Devonte Louise PA-C      hydrALAZINE  10 mg Intravenous Q6H PRN Pedro Storey, DO      HYDROmorphone  0.2 mg Intravenous Q2H PRN Devonte Louise PA-C      insulin lispro  1-5 Units Subcutaneous 4x Daily (AC & HS) Devonte Louise PA-C      multivitamin-minerals  1 tablet Oral Daily Devonte Louise PA-C      ondansetron  4 mg Oral Q6H PRN Joel Gerardo Nash, DO      oxyCODONE  2.5 mg Oral Q4H PRN Devonte Louise PA-C      Or    oxyCODONE  5 mg Oral Q4H PRN Devonte Louise PA-C      pravastatin  20 mg Oral Daily With Dinner Joel Gerardo Nash, DO      thiamine  100 mg Oral Daily Devonte Louise PA-C      tranexamic acid  1,000 mg Intravenous On Call To OR James Godfrey MD          Today, Patient Was Seen By: Devonte Louise PA-C    **Please Note: This note may have been constructed using a voice recognition system.**

## 2024-08-17 NOTE — OCCUPATIONAL THERAPY NOTE
Occupational Therapy Cancel Note:    Patient Name: Willa Blair  Today's Date: 8/17/2024    OT consult received. Chart reviewed. Pt admitted w/ L intertrochanteric displaced hip fracture and pending ortho consult. Will cx and await recommendations of ortho prior to completing OT eval.    Neetu Buenrostro, OTR/L

## 2024-08-17 NOTE — CONSULTS
Consultation - Orthopedics   Willa Blair 48 y.o. female MRN: [id number]  Unit/Bed#: E2 -01 Encounter: [id number]      Assessment & Plan     Assessment:  Left hip femoral neck displaced fracture    Plan:  NPO  Bed rest  Pain control  Have long discussion with patient due to amount of displacement with her left femoral neck fracture fixation may not be successful with risk of AVN, nonunion, or failure fixation.  I recommended that total hip arthroplasty with associated risks.  Patient agreed to proceed with left total hip replacement.  Discussed with patient surgical risks and complications including but not limited to infection, persistent pain, nerve and vessel injury, blood loss, complications associated with anesthesia, DVT, exposure to COVID virus, problem with prosthesis, leg length discrepancy, hip dislocation, etc.  Patient understands the risks and complication and consented to the surgery.    History of Present Illness   Physician Requesting Consult: Pedro Storey DO  Reason for Consult / Principal Problem: Left hip pain after fall  HPI: Willa Blair is a 48 y.o. year old female who presents with left hip pain.  Patient fell last night and was able to walk up into a bathroom and felt a crack in her left hip and has been unable to weight-bear.  She denied loss of consciousness.  She is complaining of pain in her left groin and buttock.    Inpatient consult to Orthopedic Surgery  Consult performed by: James Godfrey MD  Consult ordered by: Joel Cao DO          Review of Systems   Constitutional: Negative.    HENT: Negative.     Eyes: Negative.    Respiratory: Negative.     Cardiovascular: Negative.    Gastrointestinal: Negative.    Endocrine: Negative.    Genitourinary: Negative.    Musculoskeletal:  Positive for arthralgias (Left hip) and gait problem (Unable to weight-bear).   Skin: Negative.    Allergic/Immunologic: Negative.    Hematological: Negative.     Psychiatric/Behavioral: Negative.         Historical Information   Past Medical History:   Diagnosis Date    Diabetes mellitus (HCC)     Hypertension      Past Surgical History:   Procedure Laterality Date     SECTION      FINGER SURGERY      GASTRIC BYPASS LAPAROSCOPIC      in [facility], 2023     Social History   Social History     Substance and Sexual Activity   Alcohol Use Never     Social History     Substance and Sexual Activity   Drug Use Never     E-Cigarette/Vaping    E-Cigarette Use Never User      E-Cigarette/Vaping Substances     Social History     Tobacco Use   Smoking Status Never   Smokeless Tobacco Never     Family History: non-contributory    Meds/Allergies   all current active meds have been reviewed  Allergies   Allergen Reactions    Asa [Aspirin] Other (See Comments)     Throat closes       Objective   Vitals: Blood pressure 156/76, pulse 77, temperature 98.2 °F (36.8 °C), temperature source Temporal, resp. rate 22, weight 75.5 kg (166 lb 7.2 oz), SpO2 93%.,Body mass index is 32.51 kg/m².      Intake/Output Summary (Last 24 hours) at 2024 1000  Last data filed at 2024 0732  Gross per 24 hour   Intake 1100 ml   Output 400 ml   Net 700 ml     I/O last 24 hours:  In: 1100 [IV Piggyback:1100]  Out: 400 [Urine:400]    Invasive Devices       Peripheral Intravenous Line  Duration             Peripheral IV 24 Left;Ventral (anterior) Forearm <1 day                    Physical Exam  Constitutional:       Appearance: Normal appearance. She is well-developed.   HENT:      Head: Normocephalic and atraumatic.      Right Ear: External ear normal.      Left Ear: External ear normal.   Eyes:      Extraocular Movements: Extraocular movements intact.      Conjunctiva/sclera: Conjunctivae normal.   Cardiovascular:      Rate and Rhythm: Normal rate.   Pulmonary:      Effort: Pulmonary effort is normal.   Abdominal:      General: Abdomen is flat.   Musculoskeletal:      Cervical back: Neck  supple.   Skin:     General: Skin is warm.   Neurological:      General: No focal deficit present.      Mental Status: She is alert and oriented to person, place, and time.   Psychiatric:         Mood and Affect: Mood normal.         Behavior: Behavior normal.       Left Hip Exam     Tenderness   Left hip tenderness location: diffused.    Other   Erythema: absent  Scars: absent  Sensation: normal  Pulse: present    Comments:  Range of motion and strength not tested due to known fracture  Attempted logroll increased pain in the groin  Left leg is shortened and externally rotated            Lab Results: I have personally reviewed pertinent lab results.  CBC:   Lab Results   Component Value Date    WBC 7.89 08/17/2024    HGB 13.3 08/17/2024    HCT 41.9 08/17/2024    MCV 95 08/17/2024     08/17/2024    RBC 4.40 08/17/2024    MCH 30.2 08/17/2024    MCHC 31.7 08/17/2024    RDW 12.5 08/17/2024    MPV 11.1 08/17/2024    NRBC 0 08/17/2024     CMP:   Lab Results   Component Value Date    SODIUM 138 08/17/2024     08/17/2024    CO2 22 08/17/2024    BUN 28 (H) 08/17/2024    CREATININE 0.99 08/17/2024    CALCIUM 8.8 08/17/2024    AST 18 08/17/2024    ALT 16 08/17/2024    ALKPHOS 65 08/17/2024    EGFR 67 08/17/2024     PT/INR:   Lab Results   Component Value Date    INR 1.01 08/17/2024     Imaging Studies: I have personally reviewed pertinent films in PACS and left hip x-ray show displaced femoral neck fracture  VTE Prophylaxis: Sequential compression device (Venodyne)  and Reason for no pharmacologic prophylaxis pending surgery    Code Status: Level 1 - Full Code  Advance Directive and Living Will:      Power of :    POLST:

## 2024-08-17 NOTE — ANESTHESIA POSTPROCEDURE EVALUATION
Post-Op Assessment Note    CV Status:  Stable  Pain Score: 0    Pain management: adequate       Mental Status:  Sleepy and arousable   Hydration Status:  Stable   PONV Controlled:  None   Airway Patency:  Patent  Airway: intubated     Post Op Vitals Reviewed: Yes    No anethesia notable event occurred.    Staff: CRNA               /51 (08/17/24 1616)    Temp 97.5 °F (36.4 °C) (08/17/24 1616)    Pulse 80 (08/17/24 1616)   Resp 16 (08/17/24 1616)    SpO2 94 % (08/17/24 1616)

## 2024-08-17 NOTE — ASSESSMENT & PLAN NOTE
She notes she is not taking diabetic medications.  Previous history notes patient being on Tresiba 30 units nightly as well as Januvia and oral semaglutide  Her sugars here are currently well-controlled  Update A1c  Continue with sign scale insulin and Accu-Cheks, diabetic diet once tolerating p.o.  Monitor sugars and titrate insulin based off trends

## 2024-08-17 NOTE — ASSESSMENT & PLAN NOTE
"No results found for: \"HGBA1C\"    No results for input(s): \"POCGLU\" in the last 72 hours.    Blood Sugar Average: Last 72 hrs:    Scale insulin  Blood glucose goal 1 40-1 80  "

## 2024-08-18 LAB
ANION GAP SERPL CALCULATED.3IONS-SCNC: 6 MMOL/L (ref 4–13)
BUN SERPL-MCNC: 22 MG/DL (ref 5–25)
CALCIUM SERPL-MCNC: 9.1 MG/DL (ref 8.4–10.2)
CHLORIDE SERPL-SCNC: 102 MMOL/L (ref 96–108)
CO2 SERPL-SCNC: 27 MMOL/L (ref 21–32)
CREAT SERPL-MCNC: 0.83 MG/DL (ref 0.6–1.3)
ERYTHROCYTE [DISTWIDTH] IN BLOOD BY AUTOMATED COUNT: 12.5 % (ref 11.6–15.1)
GFR SERPL CREATININE-BSD FRML MDRD: 83 ML/MIN/1.73SQ M
GLUCOSE SERPL-MCNC: 131 MG/DL (ref 65–140)
GLUCOSE SERPL-MCNC: 187 MG/DL (ref 65–140)
GLUCOSE SERPL-MCNC: 194 MG/DL (ref 65–140)
GLUCOSE SERPL-MCNC: 214 MG/DL (ref 65–140)
GLUCOSE SERPL-MCNC: 236 MG/DL (ref 65–140)
HCT VFR BLD AUTO: 40 % (ref 34.8–46.1)
HGB BLD-MCNC: 13 G/DL (ref 11.5–15.4)
MCH RBC QN AUTO: 30.9 PG (ref 26.8–34.3)
MCHC RBC AUTO-ENTMCNC: 32.5 G/DL (ref 31.4–37.4)
MCV RBC AUTO: 95 FL (ref 82–98)
PLATELET # BLD AUTO: 225 THOUSANDS/UL (ref 149–390)
PMV BLD AUTO: 12 FL (ref 8.9–12.7)
POTASSIUM SERPL-SCNC: 4.9 MMOL/L (ref 3.5–5.3)
RBC # BLD AUTO: 4.21 MILLION/UL (ref 3.81–5.12)
SODIUM SERPL-SCNC: 135 MMOL/L (ref 135–147)
WBC # BLD AUTO: 14.89 THOUSAND/UL (ref 4.31–10.16)

## 2024-08-18 PROCEDURE — 80048 BASIC METABOLIC PNL TOTAL CA: CPT | Performed by: PHYSICIAN ASSISTANT

## 2024-08-18 PROCEDURE — 85027 COMPLETE CBC AUTOMATED: CPT | Performed by: PHYSICIAN ASSISTANT

## 2024-08-18 PROCEDURE — 99024 POSTOP FOLLOW-UP VISIT: CPT | Performed by: PHYSICIAN ASSISTANT

## 2024-08-18 PROCEDURE — 97167 OT EVAL HIGH COMPLEX 60 MIN: CPT

## 2024-08-18 PROCEDURE — 99233 SBSQ HOSP IP/OBS HIGH 50: CPT | Performed by: INTERNAL MEDICINE

## 2024-08-18 PROCEDURE — 97163 PT EVAL HIGH COMPLEX 45 MIN: CPT

## 2024-08-18 PROCEDURE — 82948 REAGENT STRIP/BLOOD GLUCOSE: CPT

## 2024-08-18 RX ADMIN — OXYCODONE HYDROCHLORIDE 5 MG: 5 TABLET ORAL at 13:59

## 2024-08-18 RX ADMIN — ACETAMINOPHEN 975 MG: 325 TABLET ORAL at 22:07

## 2024-08-18 RX ADMIN — Medication 1 TABLET: at 08:25

## 2024-08-18 RX ADMIN — ACETAMINOPHEN 975 MG: 325 TABLET ORAL at 14:00

## 2024-08-18 RX ADMIN — FAMOTIDINE 20 MG: 20 TABLET, FILM COATED ORAL at 17:31

## 2024-08-18 RX ADMIN — THIAMINE HCL TAB 100 MG 100 MG: 100 TAB at 08:25

## 2024-08-18 RX ADMIN — DOCUSATE SODIUM 100 MG: 100 CAPSULE, LIQUID FILLED ORAL at 17:31

## 2024-08-18 RX ADMIN — OXYCODONE HYDROCHLORIDE 5 MG: 5 TABLET ORAL at 20:43

## 2024-08-18 RX ADMIN — CEFAZOLIN SODIUM 2000 MG: 2 SOLUTION INTRAVENOUS at 05:38

## 2024-08-18 RX ADMIN — HYDROMORPHONE HYDROCHLORIDE 0.2 MG: 0.2 INJECTION, SOLUTION INTRAMUSCULAR; INTRAVENOUS; SUBCUTANEOUS at 16:26

## 2024-08-18 RX ADMIN — INSULIN LISPRO 1 UNITS: 100 INJECTION, SOLUTION INTRAVENOUS; SUBCUTANEOUS at 22:07

## 2024-08-18 RX ADMIN — AMLODIPINE BESYLATE 5 MG: 5 TABLET ORAL at 08:25

## 2024-08-18 RX ADMIN — ENOXAPARIN SODIUM 40 MG: 40 INJECTION SUBCUTANEOUS at 08:25

## 2024-08-18 RX ADMIN — OXYCODONE HYDROCHLORIDE 5 MG: 5 TABLET ORAL at 06:28

## 2024-08-18 RX ADMIN — PRAVASTATIN SODIUM 20 MG: 20 TABLET ORAL at 16:27

## 2024-08-18 RX ADMIN — SENNOSIDES 8.6 MG: 8.6 TABLET, FILM COATED ORAL at 22:07

## 2024-08-18 RX ADMIN — ACETAMINOPHEN 975 MG: 325 TABLET ORAL at 05:37

## 2024-08-18 RX ADMIN — INSULIN LISPRO 1 UNITS: 100 INJECTION, SOLUTION INTRAVENOUS; SUBCUTANEOUS at 07:52

## 2024-08-18 RX ADMIN — DOCUSATE SODIUM 100 MG: 100 CAPSULE, LIQUID FILLED ORAL at 08:25

## 2024-08-18 RX ADMIN — FOLIC ACID 1 MG: 1 TABLET ORAL at 08:25

## 2024-08-18 RX ADMIN — INSULIN LISPRO 1 UNITS: 100 INJECTION, SOLUTION INTRAVENOUS; SUBCUTANEOUS at 11:34

## 2024-08-18 RX ADMIN — FAMOTIDINE 20 MG: 20 TABLET, FILM COATED ORAL at 08:25

## 2024-08-18 NOTE — PLAN OF CARE
Problem: OCCUPATIONAL THERAPY ADULT  Goal: Performs self-care activities at highest level of function for planned discharge setting.  See evaluation for individualized goals.  Description: Treatment Interventions: ADL retraining, Functional transfer training, Endurance training, Patient/family training, Equipment evaluation/education, Compensatory technique education, Continued evaluation, Activityengagement          See flowsheet documentation for full assessment, interventions and recommendations.   Note: Limitation: Decreased ADL status, Decreased endurance, Decreased self-care trans, Decreased high-level ADLs  Prognosis: Good  Assessment: Pt is a 48 y.o. female seen for OT evaluation s/p adm to [facility] on [facility]/17/2024 w/ Fracture of left hip (HCC) . Pt s/p left JOSSE- posterolateral approach by Dr. Godfrey on 8/17/24. WBAT L LE. +Posterior hip precautions. Comorbidities affecting pt’s functional performance include a significant PMH of DM, HTN. Pt with active OT orders and activity orders for Activity as tolerated. Pt lives with spouse in a 2nd floor apt with FOS to enter. Spouse works, (+) home alone. At baseline, pt was I w/ ADLs, IADLs, and functional transfers/mobility w/o use of AD. FT employed. (+) . (+) fall PTA. Upon evaluation, pt currently requires Supervision for UB ADLs, Max-Mod A for LB ADLs, Mod A for toileting, Min A for bed mobility, and Min A for functional mobility/transfers 2* the following deficits impacting occupational performance: decreased balance, decreased activity tolerance, limited functional reach, increased pain, orthopedic restrictions, and dizziness. These impairments, as well at pt’s personal factors of: CAS home environment, limited home support, difficulty performing ADLs, difficulty performing IADLs, difficulty performing transfers/mobility, fall risk , functional decline , new use of AD for functional transfers/mobility, and Inability to perform current job  functions  limit pt’s ability to safely engage in all baseline areas of occupation. Pt to continue to benefit from continued acute OT services during hospital stay to address defined deficits and to maximize level of functional independence in the following Occupational Performance areas: bathing/shower, toilet hygiene, dressing, health maintenance, functional mobility, community mobility, clothing management, cleaning, meal prep, and household maintenance. The patient's raw score on the -PAC Daily Activity Inpatient Short Form is 17. A raw score of less than 19 suggests the patient may benefit from discharge to post-acute rehabilitation services. Please refer to the recommendation of the Occupational Therapist for safe discharge planning. OT will continue to follow pt 3-5x/wk to address the following goals to  w/in 10-14 days:     Rehab Resource Intensity Level, OT: I (Maximum Resource Intensity) (STR vs HHOT pending progress and stair trial. Evaluation limited 2* c/o dizziness w/ activity)

## 2024-08-18 NOTE — PROGRESS NOTES
[facility]  Progress Note  Name: Willa Blair I  MRN: [id number]  Unit/Bed#: E2 -01 I Date of Admission: 8/17/2024   Date of Service: 8/18/2024 I Hospital Day: 0    Assessment & Plan   * Fracture of left hip (HCC)  Assessment & Plan  After mechanical falls in setting of alcohol intoxication, no head strike   POD1 s/p left JSOSE- posterolateral approach by Dr. Godfrey for femoral neck fracture   Lovenox DVT prophylaxis, 40 mg x 30 days  PT OT recommending level 1 resource intensity  Continue pain control    Alcohol use  Assessment & Plan  Patient reports 2 glasses's of wine PTA for her and her SO anniversary  Denies daily drinking, history of alcohol abuse/withdrawal  Will start MVI, thiamine, folate  Monitor for withdrawal symptoms    Primary hypertension  Assessment & Plan  She notes she has not been taking medications at home  Previous medication is amlodipine 5 mg daily  Will need refill at discharge    Type 2 diabetes mellitus with obesity  (HCC)  Assessment & Plan  She notes she is not taking diabetic medications.  Previous history notes patient being on Tresiba 30 units nightly as well as Januvia and oral semaglutide  Her sugars here are currently well-controlled  A1c 7.7  Can be discharged on metformin at discharge given low A1c                 Hospital Course:     48-year-old female patient presenting with fall at home resulting in left femur fracture.  She is postoperative day #1 from left total hip arthroplasty    Assessment:      Principal Problem:    Fracture of left hip (HCC)  Active Problems:    Type 2 diabetes mellitus with obesity  (HCC)    Primary hypertension    Alcohol use      Plan:    Postoperative care  Management consult for rehab placement       VTE Pharmacologic Prophylaxis:   Pharmacologic: Enoxaparin (Lovenox)  Mechanical VTE Prophylaxis in Place: Yes    AM-PAC Basic Mobility:  Basic Mobility Inpatient Raw Score: 17    -[facility] Achieved: 5: Stand (1 or  more minutes)  -HLM Goal: 5: Stand one or more mins    HLM Goal listed above. Continue with multidisciplinary rounding and encourage appropriate mobility to improve upon HLM goals.         Patient Centered Rounds: Case discussed and reviewed with nursing    Discussions with Specialists or Other Care Team Provider: Case management    Education and Discussions with Family / Patient: Patient updating family    Time Spent for Care: 80 minutes.  More than 50% of total time spent on counseling and coordination of care as described above.    Current Length of Stay: 0 day(s)    Current Patient Status: Inpatient   Certification Statement: The patient will continue to require additional inpatient hospital stay due to rehab placement, ambulatory dysfunction    Discharge Plan / Estimated Discharge Date: To be determined based upon rehab eval    Code Status: Level 1 - Full Code      Subjective:   Seen and examined, pain is controlled.  No nausea no vomiting, no abdominal pain    A complete and comprehensive 14 point organ system review has been performed and all other systems are negative other than stated above.    Objective:     Vitals:   Temp (24hrs), Av.7 °F (36.5 °C), Min:97.2 °F (36.2 °C), Max:98.5 °F (36.9 °C)    Temp:  [97.2 °F (36.2 °C)-98.5 °F (36.9 °C)] 98.5 °F (36.9 °C)  HR:  [67-88] 72  Resp:  [12-18] 16  BP: (121-155)/(51-81) 121/72  SpO2:  [93 %-100 %] 97 %  Body mass index is 32.51 kg/m².     Input and Output Summary (last 24 hours):       Intake/Output Summary (Last 24 hours) at 2024 1416  Last data filed at 2024 1356  Gross per 24 hour   Intake 2520 ml   Output 2550 ml   Net -30 ml       Physical Exam:     General: well appearing, no acute distress  HEENT: atraumatic, PERRLA, moist mucosa, normal pharynx, normal tonsils and adenoids, normal tongue, no fluid in sinuses  Neck: Trachea midline, no carotid bruit, no masses  Respiratory: normal chest wall expansion, CTA B, no r/r/w, no  rubs  Cardiovascular: RRR, no m/r/g, Normal S1 and S2  Abdomen: Soft, non-tender, non-distended, normal bowel sounds in all quadrants, no hepatosplenomegaly, no tympany  Rectal: deferred  Musculoskeletal: Moves all   integumentary: warm, dry, and pink, with no rash, purpura, or petechia  Heme/Lymph: no lymphadenopathy, no bruises  Neurological: Cranial Nerves II-XII grossly intact  Psychiatric: cooperative with normal mood, affect, and cognition      Additional Data:     Labs:    Results from last 7 days   Lab Units 08/18/24 0448 08/17/24  0938 08/17/24  0358   WBC Thousand/uL 14.89*  --  7.89   HEMOGLOBIN g/dL 13.0  --  13.3   HEMATOCRIT % 40.0  --  41.9   PLATELETS Thousands/uL 225   < > 233   SEGS PCT %  --   --  58   LYMPHO PCT %  --   --  37   MONO PCT %  --   --  3*   EOS PCT %  --   --  1    < > = values in this interval not displayed.     Results from last 7 days   Lab Units 08/18/24 0448 08/17/24  0358   POTASSIUM mmol/L 4.9 4.3   CHLORIDE mmol/L 102 107   CO2 mmol/L 27 22   BUN mg/dL 22 28*   CREATININE mg/dL 0.83 0.99   CALCIUM mg/dL 9.1 8.8   ALK PHOS U/L  --  65   ALT U/L  --  16   AST U/L  --  18     Results from last 7 days   Lab Units 08/17/24  0358   INR  1.01       * I Have Reviewed All Lab Data Listed Above.  * Additional Pertinent Lab Tests Reviewed: All Labs For Current Hospital Admission Reviewed    Imaging:    Imaging Reports Reviewed Today Include: No new imaging  Imaging Personally Reviewed by Myself Includes: No new imaging    Recent Cultures (last 7 days):           Last 24 Hours Medication List:   Current Facility-Administered Medications   Medication Dose Route Frequency Provider Last Rate    acetaminophen  975 mg Oral Q8H ROSLYN Leann Gloria PA-C      amLODIPine  5 mg Oral Daily Leann Gloria, PA-C      bisacodyl  10 mg Rectal Daily PRN Leann Gloria, PA-C      diphenhydrAMINE  25 mg Oral Q6H PRN Don Adrian PA-C      docusate sodium  100 mg Oral BID Leann Gloria PA-FRITZ      enoxaparin  40 mg  Subcutaneous Q24H ROSLYN Leann Gloria, PA-C      famotidine  20 mg Oral BID Leann Gloria, PA-C      folic acid  1 mg Oral Daily Leann Gloria, PA-C      hydrALAZINE  10 mg Intravenous Q6H PRN Leann Gloria, PA-C      HYDROmorphone  0.2 mg Intravenous Q2H PRN Leann Gloria, PA-C      insulin lispro  1-5 Units Subcutaneous 4x Daily (AC & HS) Leann Gloria, PA-C      lactated ringers  1,000 mL Intravenous Once PRN Leann Gloria, PA-C      And    lactated ringers  1,000 mL Intravenous Once PRN Leann Gloria, PA-C      lactated ringers  100 mL/hr Intravenous Continuous Leann Gloria, PA-C Stopped (08/18/24 0700)    multivitamin-minerals  1 tablet Oral Daily Leann Gloria, PA-C      ondansetron  4 mg Oral Q6H PRN Leann Gloria, PA-C      oxyCODONE  2.5 mg Oral Q4H PRN Leann Gloria, PA-C      Or    oxyCODONE  5 mg Oral Q4H PRN Leann Gloria, PA-C      pravastatin  20 mg Oral Daily With Dinner Leann Gloria, PA-C      senna  1 tablet Oral HS Leann Gloria, PA-C      sodium chloride  1,000 mL Intravenous Once PRN Leann Gloria, PA-C      And    sodium chloride  1,000 mL Intravenous Once PRN Leann Gloria, PA-C      thiamine  100 mg Oral Daily Leann Gloria, PA-C         AM-PAC Basic Mobility:  Basic Mobility Inpatient Raw Score: 17    JH-HLM Achieved: 5: Stand (1 or more minutes)  -HLM Goal: 5: Stand one or more mins    HLM Goal listed above. Continue with multidisciplinary rounding and encourage appropriate mobility to improve upon HLM goals.     Today, Patient Was Seen By: Pedro Storey DO    ** Please Note: This note was completed in part utilizing [employer] software dictation.  Grammatical errors, random word insertions, spelling mistakes, and incomplete sentences may be an occasional consequence of this system secondary to software limitations, ambient noise, and hardware issues.  If you have any questions or concerns about the content, text, or information contained within the body of this dictation, please contact the provider for clarification.  **

## 2024-08-18 NOTE — OCCUPATIONAL THERAPY NOTE
Occupational Therapy Evaluation     Patient Name: Willa Blair  Today's Date: 2024  Problem List  Principal Problem:    Fracture of left hip (HCC)  Active Problems:    Type 2 diabetes mellitus with obesity  (HCC)    Primary hypertension    Alcohol use    Past Medical History  Past Medical History:   Diagnosis Date    Diabetes mellitus (HCC)     Hypertension      Past Surgical History  Past Surgical History:   Procedure Laterality Date     SECTION      FINGER SURGERY      GASTRIC BYPASS LAPAROSCOPIC      in [facility], [phone]   OT Last Visit   OT Visit Date 24   Note Type   Note type Evaluation   Pain Assessment   Pain Assessment Tool 0-10   Pain Score 2   Pain Location/Orientation Orientation: Left;Location: Hip   Patient's Stated Pain Goal No pain   Hospital Pain Intervention(s) Repositioned;Ambulation/increased activity;Emotional support;Rest   Multiple Pain Sites No   Restrictions/Precautions   Weight Bearing Precautions Per Order Yes   LLE Weight Bearing Per Order WBAT   Other Precautions Chair Alarm;Bed Alarm;WBS;THR;Fall Risk;Pain  (+Posterior hip precautions)   Home Living   Type of Home Apartment   Home Layout One level;Stairs to enter with rails  (2nd floor apt- FOS to enter)   Bathroom Shower/Tub Tub/shower unit   Bathroom Toilet Standard   Bathroom Equipment   (Denies DME)   Bathroom Accessibility Accessible   Home Equipment   (Denies DME)   Additional Comments Pt lives with spouse in a 2nd floor apt with FOS to enter. Spouse works, (+) home alone.   Prior Function   Level of [place] Independent with ADLs;Independent with functional mobility;Independent with IADLS   Lives With Spouse   IADLs Independent with driving;Independent with meal prep;Independent with medication management   Falls in the last 6 months 1 to 4  (1 leading to admission)   Vocational Full time employment   Comments At baseline, pt was I w/ ADLs, IADLs, and functional  transfers/mobility w/o use of AD. FT employed. (+) . (+) fall PTA.   Lifestyle   Autonomy At baseline, pt was I w/ ADLs, IADLs, and functional transfers/mobility w/o use of AD. FT employed. (+) . (+) fall PTA.   Reciprocal Relationships Spouse   Service to Others FT employed   ADL   Where Assessed Chair   Eating Assistance 7  Independent   Grooming Assistance 6  Modified Independent   UB Bathing Assistance 5  Supervision/Setup   LB Bathing Assistance 3  Moderate Assistance   UB Dressing Assistance 5  Supervision/Setup   LB Dressing Assistance 2  Maximal Assistance   Toileting Assistance  3  Moderate Assistance   Additional Comments Educated pt on posterior hip precautions and distributed posterior THR packet. Pt will benefit from education on LH AE in future session as pt +home alone- deferred at this time 2* dizziness.   Bed Mobility   Supine to Sit 4  Minimal assistance   Additional items Assist x 1;HOB elevated;Bedrails;Increased time required;Verbal cues   Sit to Supine Unable to assess   Additional Comments Resting SpO2: 98% on 2L O2. +dizziness seated EOB. BP: 94/49. Pt seated OOB in chair with chair alarm activated at end of session. Call bell and phone within reach. All needs met and pt reports no further questions for OT at this time.   Transfers   Sit to Stand 4  Minimal assistance   Additional items Assist x 1;Bedrails;Increased time required;Verbal cues   Stand to Sit 4  Minimal assistance   Additional items Assist x 1;Armrests;Increased time required;Verbal cues   Additional Comments Cues for safe technique and placement of hands and L LE   Functional Mobility   Functional Mobility 4  Minimal assistance   Additional Comments Assist x1; EOB>Bedside chair. Functional mobility limited 2* c/o dizziness w/ activity. BPs seated in chair: 100/52, 106/58. SpO2: 90-91% on RA   Additional items Rolling walker   Balance   Static Sitting Fair +   Dynamic Sitting Fair   Static Standing Fair -   Dynamic  Standing Poor +   Ambulatory Poor +   Activity Tolerance   Activity Tolerance Patient limited by pain;Treatment limited secondary to medical complications (Comment);Other (Comment)  (dizziness)   Medical Staff Made Aware RENNY Brink   Nurse Made Aware yes; JOVANNY Berry   RUE Assessment   RUE Assessment WFL  (4+/5 throughout)   LUE Assessment   LUE Assessment WFL  (4+/5 throughout)   Hand Function   Gross Motor Coordination Functional   Fine Motor Coordination Functional   Sensation   Light Touch No apparent deficits   Proprioception   Proprioception No apparent deficits   Vision-Basic Assessment   Current Vision Wears glasses all the time   Vision - Complex Assessment   Ocular Range of Motion Intact   Acuity Able to read clock/calendar on wall without difficulty;Able to read employee name badge without difficulty   Psychosocial   Psychosocial (WDL) WDL   Perception   Inattention/Neglect Appears intact   Cognition   Overall Cognitive Status WFL   Arousal/Participation Alert;Cooperative   Attention Within functional limits   Orientation Level Oriented X4   Memory Within functional limits   Following Commands Follows all commands and directions without difficulty   Assessment   Limitation Decreased ADL status;Decreased endurance;Decreased self-care trans;Decreased high-level ADLs   Prognosis Good   Assessment Pt is a 48 y.o. female seen for OT evaluation s/p adm to [facility] on 8/17/2024 w/ Fracture of left hip (HCC) . Pt s/p left JOSSE- posterolateral approach by Dr. Godfrey on 8/17/24. WBAT L LE. +Posterior hip precautions. Comorbidities affecting pt’s functional performance include a significant PMH of DM, HTN. Pt with active OT orders and activity orders for Activity as tolerated. Pt lives with spouse in a 2nd floor apt with FOS to enter. Spouse works, (+) home alone. At baseline, pt was I w/ ADLs, IADLs, and functional transfers/mobility w/o use of AD. FT employed. (+) . (+) fall PTA. Upon evaluation, pt  currently requires Supervision for UB ADLs, Max-Mod A for LB ADLs, Mod A for toileting, Min A for bed mobility, and Min A for functional mobility/transfers 2* the following deficits impacting occupational performance: decreased balance, decreased activity tolerance, limited functional reach, increased pain, orthopedic restrictions, and dizziness. These impairments, as well at pt’s personal factors of: CAS home environment, limited home support, difficulty performing ADLs, difficulty performing IADLs, difficulty performing transfers/mobility, fall risk , functional decline , new use of AD for functional transfers/mobility, and Inability to perform current job functions  limit pt’s ability to safely engage in all baseline areas of occupation. Pt to continue to benefit from continued acute OT services during hospital stay to address defined deficits and to maximize level of functional independence in the following Occupational Performance areas: bathing/shower, toilet hygiene, dressing, health maintenance, functional mobility, community mobility, clothing management, cleaning, meal prep, and household maintenance. The patient's raw score on the -PAC Daily Activity Inpatient Short Form is 17. A raw score of less than 19 suggests the patient may benefit from discharge to post-acute rehabilitation services. Please refer to the recommendation of the Occupational Therapist for safe discharge planning. OT will continue to follow pt 3-5x/wk to address the following goals to  w/in 10-14 days:   Goals   Patient Goals For her dizziness to improve   LTG Time Frame 10-14   Long Term Goal Please refer to LTGs listed below   Plan   Treatment Interventions ADL retraining;Functional transfer training;Endurance training;Patient/family training;Equipment evaluation/education;Compensatory technique education;Continued evaluation;Activityengagement   Goal Expiration Date 24   OT Treatment Day 0   OT Frequency 3-5x/wk    Discharge Recommendation   Rehab Resource Intensity Level, OT I (Maximum Resource Intensity)  (STR vs HHOT pending progress and stair trial. Evaluation limited 2* c/o dizziness w/ activity)   AM-PAC Daily Activity Inpatient   Lower Body Dressing 2   Bathing 2   Toileting 2   Upper Body Dressing 3   Grooming 4   Eating 4   Daily Activity Raw Score 17   Daily Activity Standardized Score (Calc for Raw Score >=11) 37.26   AM-PAC Applied Cognition Inpatient   Following a Speech/Presentation 4   Understanding Ordinary Conversation 4   Taking Medications 4   Remembering Where Things Are Placed or Put Away 4   Remembering List of 4-5 Errands 4   Taking Care of Complicated Tasks 4   Applied Cognition Raw Score 24   Applied Cognition Standardized Score 62.21       GOALS    Pt will improve activity tolerance to G for min 30 min txment sessions for increase engagement in functional tasks    Pt will complete bed mobility at a Mod I level w/ G balance/safety demonstrated to decrease caregiver assistance required     Pt will complete UB dressing/self care w/ mod I using adaptive device and DME as needed     Pt will complete LB dressing/self care w/ mod I using adaptive device, LH AE, and DME as needed    Pt will complete toileting w/ mod I w/ G hygiene/thoroughness using DME as needed    Pt will improve functional transfers to Mod I on/off all surfaces using DME as needed w/ G balance/safety     Pt will improve functional mobility during ADL/IADL/leisure tasks to Mod I using DME as needed w/ G balance/safety     Pt will be attentive 100% of the time during ongoing cognitive assessment w/ G participation to assist w/ safe d/c planning/recommendations    Pt will demonstrate G carryover of pt/caregiver education and training as appropriate w/o cues w/ good tolerance to increase safety during functional tasks    Pt will verbalize 3 potential fall hazards and identify appropriate compensatory techniques to decrease fall risk in home  environment     Pt will increase standing tolerance to 8-10 mins with Fair+ dynamic standing balance to increase safety during participation in ADLs     Pt will demonstrate 100% adherence to 3/3 Posterior hip precautions during all functional activities w/o cues from therapist       Neetu Buenrostro OTR/L

## 2024-08-18 NOTE — ASSESSMENT & PLAN NOTE
She notes she is not taking diabetic medications.  Previous history notes patient being on Tresiba 30 units nightly as well as Januvia and oral semaglutide  Her sugars here are currently well-controlled  A1c 7.7  Can be discharged on metformin at discharge given low A1c

## 2024-08-18 NOTE — ASSESSMENT & PLAN NOTE
After mechanical falls in setting of alcohol intoxication, no head strike   POD1 s/p left JOSSE- posterolateral approach by Dr. Godfrey for femoral neck fracture   Lovenox DVT prophylaxis, 40 mg x 30 days  PT OT recommending level 1 resource intensity  Continue pain control

## 2024-08-18 NOTE — ASSESSMENT & PLAN NOTE
She notes she has not been taking medications at home  Previous medication is amlodipine 5 mg daily  Will need refill at discharge

## 2024-08-18 NOTE — PLAN OF CARE
Problem: PHYSICAL THERAPY ADULT  Goal: Performs mobility at highest level of function for planned discharge setting.  See evaluation for individualized goals.  Description: Treatment/Interventions: Functional transfer training, LE strengthening/ROM, Elevations, Therapeutic exercise, Endurance training, Patient/family training, Bed mobility, Gait training, Spoke to nursing, OT  Equipment Recommended: Walker       See flowsheet documentation for full assessment, interventions and recommendations.  Note: Prognosis: Good  Problem List: Decreased strength, Decreased range of motion, Decreased endurance, Impaired balance, Decreased mobility, Pain, Orthopedic restrictions  Assessment: Pt. 48 y.o.female presented after a mechanical fall at home. Pt admitted for Fracture of left hip ([facility]) w/ Closed intertrochanteric fracture of hip, left, initial encounter ([facility]) (S72.091T). S/p  L THR on 8/17 . Pt referred to PT for mobility assessment & D/C planning w/ orders of Activity as tolerated, WBAT LLE & (+) posterior hip precautions . Please see above for information re: home set-up & PLOF as well as objective findings during PT assessment. PTA, pt reports being I w/o AD. On eval, pt functioning below baseline hence will continue skilled PT to improve function & safety. Pt require minAx1 for most functional mobility w/ RW + cues for techniques & safety. Gait deviations as above but no gross LOB noted. Dec amb tolerance 2* to weakness, fatigue & dizziness. (+) dizziness upon sitting at EOB w/ BP 94/49. Pt's dizziness resolving while resting hence able to complete transfers & brief amb. However, pt reports of inc dizziness immediately after amb hence elevated BLE in recliner w/ /52. Dizziness resolved after resting & BLE elevation. /58 at end session while sitting in recliner w/ BLE elevated. RN made aware.  The patient's AM-PAC Basic Mobility Inpatient Short Form Raw Score is 17. A Raw score of greater than 16 suggests  the patient may benefit from discharge to home. Please also refer to the recommendation of the Physical Therapist for safe discharge planning. From PT standpoint, will recommend Level I (maximun resource intensity) rehab services at D/C, pending progress. No SOB reported t/o session. SpO2 91% at RA. Nsg staff most recent vital signs as follows: /66 (BP Location: Right arm)   Pulse 67   Temp 97.5 °F (36.4 °C) (Temporal)   Resp 16   Wt 75.5 kg (166 lb 7.2 oz)   SpO2 100%   BMI 32.51 kg/m² . At end of session, pt OOB in chair in stable condition, call bell & phone in reach, chair alarm activated, all lines intact. Fall precautions reinforced w/ good understanding. CM to follow. Nsg staff to continue to mobilized pt (OOB in chair for all meals & ambulate in room) as tolerated to prevent further decline in function. Will also recommend Restorative for daily amb &/or daily OOB in chair as appropriate. Nsg notified. Co-eval was necessary to complete this PT eval for the pt's best interest given pt's medical acuity & complexity.    Barriers to Discharge: Inaccessible home environment, Decreased caregiver support  Barriers to Discharge Comments: (+) stairs; (+) times home alone    Rehab Resource Intensity Level, PT: I (Maximum Resource Intensity) (pending progress)    See flowsheet documentation for full assessment.

## 2024-08-18 NOTE — PROGRESS NOTES
"Willa Blair  48 y.o.  female  MR#: [id number]  8/18/2024    Post-op days: 1  Extremity: left hip    Subjective: Patient seen and examined this morning.  Sitting up comfortably in a chair.  No issues overnight.  She states the pain in her left hip is much improved compared to prior to surgery.  She complains of mild pain with pressure sensation around the hip.  She denies any chest pain, shortness of breath, fevers or chills.    Vitals:   Vitals:    08/18/24 0700   BP: 150/66   Pulse: 67   Resp: 16   Temp: 97.5 °F (36.4 °C)   SpO2: 100%       Exam:   A&O x 3 NAD  Left hip:  Mepilex dressing clean, dry and intact  Thigh and calf compartments are soft and compressible  Actively moving ankle and toes  Sensation intact to light touch  DP 2+    X-rays:  Pelvis: Well aligned prosthesis of the left hip with no evidence of loosening    Labs:   WBC   Recent Labs     08/17/24  0358 08/18/24 0448   WBC 7.89 14.89*     H/H   Recent Labs     08/17/24  0358 08/18/24  0448   HGB 13.3 13.0   /  Recent Labs     08/17/24 0358 08/18/24  0448   HCT 41.9 40.0     Sed Rate No results for input(s): \"SEDRATE\" in the last 72 hours.  CRP No results for input(s): \"CRP\" in the last 72 hours.    Assessment:   POD1 s/p left JOSSE- posterolateral approach by Dr. Godfrey for femoral neck fracture    Plan:   Weightbearing as tolerated to left lower extremity with assistance  PT/OT: Posterior hip precautions  Pain control as needed  DVT prophylaxis: Lovenox, mechanical.  Patient has allergy to aspirin. Recommend either Lovenox or Eliquis 2.5 mg twice daily x 4 weeks at discharge.  Leukocytosis most likely reactive.  Encourage incentive spirometry.  Will continue to assess for ABLA  DC planning.  Ortho stable for discharge once cleared by primary team and PT/OT has made their discharge recommendations.  Follow-up as outpatient with Dr. Godfrey in 10 to 14 days.  "

## 2024-08-18 NOTE — PLAN OF CARE
Problem: PAIN - ADULT  Goal: Verbalizes/displays adequate comfort level or baseline comfort level  Description: Interventions:  - Encourage patient to monitor pain and request assistance  - Assess pain using appropriate pain scale  - Administer analgesics based on type and severity of pain and evaluate response  - Implement non-pharmacological measures as appropriate and evaluate response  - Consider cultural and social influences on pain and pain management  - Notify physician/advanced practitioner if interventions unsuccessful or patient reports new pain  Outcome: Progressing     Problem: SAFETY ADULT  Goal: Patient will remain free of falls  Description: INTERVENTIONS:  - Educate patient/family on patient safety including physical limitations  - Instruct patient to call for assistance with activity   - Consult OT/PT to assist with strengthening/mobility   - Keep Call bell within reach  - Keep bed low and locked with side rails adjusted as appropriate  - Keep care items and personal belongings within reach  - Initiate and maintain comfort rounds  - Make Fall Risk Sign visible to staff  - Offer Toileting every 2 Hours, in advance of need  - Initiate/Maintain bed alarm  - Obtain necessary fall risk management equipment: walker  Problem: MUSCULOSKELETAL - ADULT  Goal: Maintain or return mobility to safest level of function  Description: INTERVENTIONS:  - Assess patient's ability to carry out ADLs; assess patient's baseline for ADL function and identify physical deficits which impact ability to perform ADLs (bathing, care of mouth/teeth, toileting, grooming, dressing, etc.)  - Assess/evaluate cause of self-care deficits   - Assess range of motion  - Assess patient's mobility  - Assess patient's need for assistive devices and provide as appropriate  - Encourage maximum independence but intervene and supervise when necessary  - Involve family in performance of ADLs  - Assess for home care needs following discharge   -  Consider OT consult to assist with ADL evaluation and planning for discharge  - Provide patient education as appropriate  Outcome: Progressing     Problem: Prexisting or High Potential for Compromised Skin Integrity  Goal: Skin integrity is maintained or improved  Description: INTERVENTIONS:  - Identify patients at risk for skin breakdown  - Assess and monitor skin integrity  - Assess and monitor nutrition and hydration status  - Monitor labs   - Assess for incontinence   - Turn and reposition patient  - Assist with mobility/ambulation  - Relieve pressure over bony prominences  - Avoid friction and shearing  - Provide appropriate hygiene as needed including keeping skin clean and dry  - Evaluate need for skin moisturizer/barrier cream  - Collaborate with interdisciplinary team   - Patient/family teaching  - Consider wound care consult   Outcome: Progressing     - Apply yellow socks and bracelet for high fall risk patients  - Consider moving patient to room near nurses station  Outcome: Progressing

## 2024-08-18 NOTE — PLAN OF CARE
Problem: PAIN - ADULT  Goal: Verbalizes/displays adequate comfort level or baseline comfort level  Description: Interventions:  - Encourage patient to monitor pain and request assistance  - Assess pain using appropriate pain scale  - Administer analgesics based on type and severity of pain and evaluate response  - Implement non-pharmacological measures as appropriate and evaluate response  - Consider cultural and social influences on pain and pain management  - Notify physician/advanced practitioner if interventions unsuccessful or patient reports new pain  Outcome: Progressing     Problem: SAFETY ADULT  Goal: Patient will remain free of falls  Description: INTERVENTIONS:  - Educate patient/family on patient safety including physical limitations  - Instruct patient to call for assistance with activity   - Consult OT/PT to assist with strengthening/mobility   - Keep Call bell within reach  - Keep bed low and locked with side rails adjusted as appropriate  - Keep care items and personal belongings within reach  - Initiate and maintain comfort rounds  - Make Fall Risk Sign visible to staff  - Offer Toileting every 2 Hours, in advance of need  - Initiate/Maintain bed alarm  - Obtain necessary fall risk management equipment:   - Apply yellow socks and bracelet for high fall risk patients  - Consider moving patient to room near nurses station  Outcome: Progressing  Goal: Maintain or return to baseline ADL function  Description: INTERVENTIONS:  -  Assess patient's ability to carry out ADLs; assess patient's baseline for ADL function and identify physical deficits which impact ability to perform ADLs (bathing, care of mouth/teeth, toileting, grooming, dressing, etc.)  - Assess/evaluate cause of self-care deficits   - Assess range of motion  - Assess patient's mobility; develop plan if impaired  - Assess patient's need for assistive devices and provide as appropriate  - Encourage maximum independence but intervene and  supervise when necessary  - Involve family in performance of ADLs  - Assess for home care needs following discharge   - Consider OT consult to assist with ADL evaluation and planning for discharge  - Provide patient education as appropriate  Outcome: Progressing  Goal: Maintains/Returns to pre admission functional level  Description: INTERVENTIONS:  - Perform AM-PAC 6 Click Basic Mobility/ Daily Activity assessment daily.  - Set and communicate daily mobility goal to care team and patient/family/caregiver.   - Collaborate with rehabilitation services on mobility goals if consulted  - Perform Range of Motion 3 times a day.  - Reposition patient every 2 hours.  - Dangle patient 3 times a day  - Stand patient 3 times a day  - Ambulate patient 3 times a day  - Out of bed to chair 3 times a day   - Out of bed for meals 3 times a day  - Out of bed for toileting  - Record patient progress and toleration of activity level   Outcome: Progressing     Problem: DISCHARGE PLANNING  Goal: Discharge to home or other facility with appropriate resources  Description: INTERVENTIONS:  - Identify barriers to discharge w/patient and caregiver  - Arrange for needed discharge resources and transportation as appropriate  - Identify discharge learning needs (meds, wound care, etc.)  - Arrange for interpretive services to assist at discharge as needed  - Refer to Case Management Department for coordinating discharge planning if the patient needs post-hospital services based on physician/advanced practitioner order or complex needs related to functional status, cognitive ability, or social support system  Outcome: Progressing     Problem: Knowledge Deficit  Goal: Patient/family/caregiver demonstrates understanding of disease process, treatment plan, medications, and discharge instructions  Description: Complete learning assessment and assess knowledge base.  Interventions:  - Provide teaching at level of understanding  - Provide teaching via  preferred learning methods  Outcome: Progressing     Problem: MUSCULOSKELETAL - ADULT  Goal: Maintain or return mobility to safest level of function  Description: INTERVENTIONS:  - Assess patient's ability to carry out ADLs; assess patient's baseline for ADL function and identify physical deficits which impact ability to perform ADLs (bathing, care of mouth/teeth, toileting, grooming, dressing, etc.)  - Assess/evaluate cause of self-care deficits   - Assess range of motion  - Assess patient's mobility  - Assess patient's need for assistive devices and provide as appropriate  - Encourage maximum independence but intervene and supervise when necessary  - Involve family in performance of ADLs  - Assess for home care needs following discharge   - Consider OT consult to assist with ADL evaluation and planning for discharge  - Provide patient education as appropriate  Outcome: Progressing  Goal: Maintain proper alignment of affected body part  Description: INTERVENTIONS:  - Support, maintain and protect limb and body alignment  - Provide patient/ family with appropriate education  Outcome: Progressing     Problem: Prexisting or High Potential for Compromised Skin Integrity  Goal: Skin integrity is maintained or improved  Description: INTERVENTIONS:  - Identify patients at risk for skin breakdown  - Assess and monitor skin integrity  - Assess and monitor nutrition and hydration status  - Monitor labs   - Assess for incontinence   - Turn and reposition patient  - Assist with mobility/ambulation  - Relieve pressure over bony prominences  - Avoid friction and shearing  - Provide appropriate hygiene as needed including keeping skin clean and dry  - Evaluate need for skin moisturizer/barrier cream  - Collaborate with interdisciplinary team   - Patient/family teaching  - Consider wound care consult   Outcome: Progressing

## 2024-08-18 NOTE — UTILIZATION REVIEW
Initial Clinical Review  OBSERVATION 8/17 0437  CHANGED TO INPATIENT 8/18 1407. AMBULATORY DYSFUNCTION, DIZZINESS POD 1 Total HIP ARTHROPLASTY for  left hip displaced femoral neck fracture     Admission: Date/Time/Statement:   Admission Orders (From admission, onward)       Ordered        08/18/24 1407  INPATIENT ADMISSION  Once                          Orders Placed This Encounter   Procedures    INPATIENT ADMISSION     Standing Status:   Standing     Number of Occurrences:   1     Order Specific Question:   Level of Care     Answer:   Med Surg [16]     Order Specific Question:   Bed Type     Answer:   Neyda [4]     Order Specific Question:   Estimated length of stay     Answer:   More than 2 Midnights     Order Specific Question:   Certification     Answer:   I certify that inpatient services are medically necessary for this patient for a duration of greater than two midnights. See H&P and MD Progress Notes for additional information about the patient's course of treatment.     ED Arrival Information       Expected   -    Arrival   8/17/2024 03:17    Acuity   Urgent              Means of arrival   Ambulance    Escorted by   [facility] ([facility])    Service   Hospitalist    Admission type   Emergency              Arrival complaint   Hip Pain             Chief Complaint   Patient presents with    Fall     Pt reports multiple falls after couple glasses of wine, pt reports after first fall she was able to walk up the steps, the second fall occurred in the bathroom states she heard a crack on her left hip. Denies headstrike and thinners.        Initial Presentation: 48 y.o. female presents to ED from home via EMS after fall.  X-ray demonstrates left intertrochanteric displaced hip fracture. Denies head strike , denies LOC, not on AC. Admitted as Observation to med surg for hip fracture. Plan consult orthopedic surgery Make NPO, pain management Tylenol, dilaudid as needed. Continue home med amlodipine  Monitor blood glucose, coverage with RISS      8/17 Ortho consult  left femoral neck fracture fixation may not be successful with risk of AVN, nonunion, or failure fixation.   Recommend  total hip arthroplasty with associated risks.  Patient agreed to proceed with left total hip replacement.  Exam: diffuse left hip tenderness, attenmpted legroll increased pain in groin, left lef is shortened and externally rotated.     I ntake/Output Summary (Last 24 hours) at 8/17/2024 1000  Last data filed at 8/17/2024 0732      Gross per 24 hour   Intake 1100 ml   Output 400 ml   Net 700 ml     8/17 Internal medicine: presents with left femur fracture after a fall.  Scheduled for operative management today.  She has been cleared for surgical intervention     8/17 To OR     Procedure    Left - ARTHROPLASTY HIP TOTAL  Hip Approach: Posterior/lateral   Anesthesia  general       Date: 8/18   Changed to Inpatient  Post operative Day 1 Left total hip arthroplasty.  Dizziness upon sitting at edge of bed, BP 94/49.  Unsteady gait, + dizziness immediately after ambulation. /52 with BLE elevated in recliner  Patient reports history of alcohol abuse, withdrawal. Will start MVI, Thiamine folate, will monitor for withdrawal symptoms, has not been taking any medication at home. Continue Norvasc for HTN, A1c is 7.7, will plan for DC on Metformin.     Certification Statement: The patient will continue to require additional inpatient hospital stay due to rehab placement, ambulatory dysfunction    Ortho exam 8/18    Left hip: Mepilex dressing clean, dry and intact. Thigh and calf compartments are soft and compressible  Actively moving ankle and toes. Sensation intact to light touch DP 2+      8/19 INPATIENT DAY 2   Feels well, POD 2 left total hip for femoral neck fracture Reports pain at left hip and groin only with palpation. All numbness and tingling has resolved Exam Alert, oriented, mepilex dressing C/D/I Thigh, claf compartments soft,  compressible, moves ankle and toes, DP +2, sensation intact     ED Triage Vitals [08/17/24 0320]   Temperature Pulse Respirations Blood Pressure SpO2 Pain Score   98.6 °F (37 °C) 87 18 134/81 96 % 10 - Worst Possible Pain     Weight (last 2 days)       Date/Time Weight    08/17/24 0948 75.5 (166.45)    08/17/24 0320 79.9 (176.15)            Vital Signs (last 3 days)       Date/Time Temp Pulse Resp BP MAP (mmHg) SpO2 Calculated FIO2 (%) - Nasal Cannula Nasal Cannula O2 Flow Rate (L/min) O2 Device Cardiac (WDL) Patient Position - Orthostatic VS [place] Coma Scale Score Pain    08/19/24 1001 -- -- -- -- -- -- -- -- -- -- -- -- 10 - Worst Possible Pain    08/19/24 0950 -- -- -- -- -- -- -- -- -- -- -- -- 10 - Worst Possible Pain    08/19/24 0712 97.3 °F (36.3 °C) 65 18 108/51 60 98 % -- -- None (Room air) -- Lying -- --    08/19/24 0431 -- -- -- -- -- -- -- -- -- -- -- -- 8    08/18/24 2300 98.7 °F (37.1 °C) 50 18 133/64 -- 99 % -- -- None (Room air) -- Lying -- --    08/18/24 2035 -- -- -- -- -- -- -- -- -- -- -- 15 10 - Worst Possible Pain    08/18/24 1849 98.3 °F (36.8 °C) 67 18 142/65 98 98 % -- -- None (Room air) -- Lying -- --    08/18/24 1626 -- -- -- -- -- -- -- -- -- -- -- -- 10 - Worst Possible Pain    08/18/24 1543 97.1 °F (36.2 °C) 65 18 121/59 70 96 % -- -- None (Room air) -- Lying -- --    08/18/24 1359 -- -- -- -- -- -- -- -- -- -- -- -- 10 - Worst Possible Pain    08/18/24 1107 98.5 °F (36.9 °C) 72 16 121/72 87 97 % -- -- None (Room air) -- Sitting -- --    08/18/24 0730 -- -- -- -- -- -- -- -- -- -- -- 15 2 08/18/24 0700 97.5 °F (36.4 °C) 67 16 150/66 -- 100 % -- -- None (Room air) -- -- -- --    08/18/24 0537 -- -- -- -- -- -- -- -- -- -- -- -- 4    08/17/24 2209 97.2 °F (36.2 °C) 67 16 142/66 79 98 % -- -- None (Room air) -- Lying -- --    08/17/24 2206 -- -- -- -- -- -- -- -- -- -- -- -- 8    08/17/24 2023 -- -- -- -- -- -- -- -- -- -- -- -- 10 - Worst Possible Pain    08/17/24 1948 -- -- -- -- --  -- -- -- -- -- -- 15 --    08/17/24 1930 97.8 °F (36.6 °C) 72 18 151/64 76 98 % 28 2 L/min Nasal cannula -- Lying -- --    08/17/24 1836 -- -- -- -- -- -- -- -- -- -- -- 15 No Pain    08/17/24 1725 97.7 °F (36.5 °C) 86 14 136/81 90 93 % 28 2 L/min Nasal cannula -- Lying -- --    08/17/24 1700 -- 82 12 155/60 98 98 % 28 2 L/min Nasal cannula -- -- -- No Pain    08/17/24 1646 97.8 °F (36.6 °C) 87 14 136/54 80 99 % 28 2 L/min Nasal cannula -- -- -- No Pain    08/17/24 1630 -- 88 12 131/52 84 98 % 28 2 L/min Nasal cannula -- -- -- No Pain    08/17/24 1616 97.5 °F (36.4 °C) 80 16 131/51 77 94 % 28 2 L/min Nasal cannula WDL -- -- No Pain    08/17/24 0943 -- -- -- -- -- -- -- -- -- -- -- -- 10 - Worst Possible Pain    08/17/24 0902 -- -- -- 156/76 88 -- -- -- -- -- Lying -- --    08/17/24 0732 98.2 °F (36.8 °C) 77 22 176/75 95 93 % -- -- None (Room air) -- Lying -- --    08/17/24 0643 -- -- -- -- -- -- -- -- -- -- -- -- 10 - Worst Possible Pain    08/17/24 0617 98 °F (36.7 °C) 91 18 159/78 -- 94 % -- -- None (Room air) -- Lying 15 --    08/17/24 0505 -- -- -- -- -- -- -- -- -- -- -- -- 10 - Worst Possible Pain    08/17/24 0320 98.6 °F (37 °C) 87 18 134/81 -- 96 % -- -- None (Room air) -- Lying -- 10 - Worst Possible Pain              Pertinent Labs/Diagnostic Test Results:   Radiology:  XR pelvis ap only 1 or 2 vw   Final Interpretation by Darío Wilkerson MD (08/19 0756)      Unremarkable total left hip arthroplasty.         Computerized Assisted Algorithm (CAA) may have been used to analyze all applicable images.         XR hip/pelv 2-3 vws left   ED Interpretation by Riaz Jimenes Jr., DO (08/17 0432)   On my interpretation:  Left intertrochanteric displaced hip fracture      Final Interpretation by Arash Miranda MD (08/18 1038)      Acute left femoral neck fracture.         Computerized Assisted Algorithm (CAA) may have been used to analyze all applicable images.            XR chest 1 view portable   ED  Interpretation by Riaz Jimenes Jr., DO (08/17 7883)   The CXR was ordered by myself and interpreted by me independently.  On my read, it appears without acute abnormalities:  - The  cardiomediastinal  silhouette  is  unremarkable.    - The  lungs  are  clear.  - No  pleural  effusions.  - No  pneumothorax.  - The  pulmonary  vasculature  is  within  normal  limits.    - The  trachea  is  midline.    - Bony  thorax  is  unremarkable.                Final Interpretation by Riaz Penny MD (08/17 7196)      No acute cardiopulmonary disease.      Findings are stable              Cardiology:  ECG 12 lead   Final Result by Ernie Block DO (08/17 2232)   Sinus rhythm with marked sinus arrhythmia   Otherwise normal ECG   When compared with ECG of 10-ANGI-2024 19:42,   No significant change was found   Confirmed by Ernie Block ([id number]) on 8/17/2024 10:32:40 PM        GI:  No orders to display           Results from last 7 days   Lab Units 08/19/24  1002 08/18/24  0448 08/17/24  0938 08/17/24  0358   WBC Thousand/uL 14.01* 14.89*  --  7.89   HEMOGLOBIN g/dL 12.3 13.0  --  13.3   HEMATOCRIT % 38.3 40.0  --  41.9   PLATELETS Thousands/uL 213 225 211 233   TOTAL NEUT ABS Thousands/µL  --   --   --  4.59         Results from last 7 days   Lab Units 08/18/24  0448 08/17/24  0358   SODIUM mmol/L 135 138   POTASSIUM mmol/L 4.9 4.3   CHLORIDE mmol/L 102 107   CO2 mmol/L 27 22   ANION GAP mmol/L 6 9   BUN mg/dL 22 28*   CREATININE mg/dL 0.83 0.99   EGFR ml/min/1.73sq m 83 67   CALCIUM mg/dL 9.1 8.8     Results from last 7 days   Lab Units 08/17/24  0358   AST U/L 18   ALT U/L 16   ALK PHOS U/L 65   TOTAL PROTEIN g/dL 6.9   ALBUMIN g/dL 3.7   TOTAL BILIRUBIN mg/dL 0.45     Results from last 7 days   Lab Units 08/19/24  1121 08/19/24  0635 08/18/24  2104 08/18/24  1547 08/18/24  1110 08/18/24  0634 08/17/24  2108 08/17/24  1631 08/17/24  1118 08/17/24  0729   POC GLUCOSE mg/dl 164* 155* 214* 131 194* 187* 216*  "182* 122 132     Results from last 7 days   Lab Units 08/18/24  0448 08/17/24  0358   GLUCOSE RANDOM mg/dL 236* 202*         Results from last 7 days   Lab Units 08/17/24  0938   HEMOGLOBIN A1C % 7.7*   EAG mg/dl 174     No results found for: \"BETA-HYDROXYBUTYRATE\"                           Results from last 7 days   Lab Units 08/17/24  0358   PROTIME seconds 13.5   INR  1.01   PTT seconds 29             ED Treatment-Medication Administration from 08/17/2024 0316 to 08/17/2024 0613         Date/Time Order Dose Route Action     08/17/2024 0400 lactated ringers bolus 1,000 mL 1,000 mL Intravenous New Bag     08/17/2024 0347 HYDROmorphone (DILAUDID) injection 1 mg 1 mg Intravenous Given     08/17/2024 0347 ondansetron (ZOFRAN) injection 4 mg 4 mg Intravenous Given     08/17/2024 0508 tranexamic Acid 800 mg in sodium chloride 0.9 % 100 mL IVPB 800 mg Intravenous New Bag     08/17/2024 0505 fentaNYL injection 50 mcg 50 mcg Intravenous Given            Past Medical History:   Diagnosis Date    Diabetes mellitus ([facility])     Hypertension      Present on Admission:   Type 2 diabetes mellitus with obesity  ([facility])   Primary hypertension      Admitting Diagnosis: Hip pain [M25.559]  Closed intertrochanteric fracture of hip, left, initial encounter ([facility]) [S72.416A]  Age/Sex: 48 y.o. female  Admission Orders:  Scheduled Medications:  acetaminophen, 975 mg, Oral, Q8H ROSLYN  amLODIPine, 5 mg, Oral, Daily  docusate sodium, 100 mg, Oral, BID  enoxaparin, 40 mg, Subcutaneous, Q24H ROSLYN  famotidine, 20 mg, Oral, BID  folic acid, 1 mg, Oral, Daily  insulin lispro, 1-5 Units, Subcutaneous, 4x Daily (AC & HS)  multivitamin-minerals, 1 tablet, Oral, Daily  pravastatin, 20 mg, Oral, Daily With Dinner  senna, 1 tablet, Oral, HS  thiamine, 100 mg, Oral, Daily      Continuous IV Infusions:       PRN Meds:  bisacodyl, 10 mg, Rectal, Daily PRN  diphenhydrAMINE, 25 mg, Oral, Q6H PRN  hydrALAZINE, 10 mg, Intravenous, Q6H PRN  HYDROmorphone, 0.2 mg, " Intravenous, Q2H PRN  x 1  8/17, x 1  8/18   lactated ringers, 1,000 mL, Intravenous, Once PRN   And  lactated ringers, 1,000 mL, Intravenous, Once PRN  ondansetron, 4 mg, Oral, Q6H PRN  oxyCODONE, 2.5 mg, Oral, Q4H PRN   Or  oxyCODONE, 5 mg, Oral, Q4H PRN  x 1  8/17, x 2  8/18   sodium chloride, 1,000 mL, Intravenous, Once PRN   And  sodium chloride, 1,000 mL, Intravenous, Once PRN    IP CONSULT TO ORTHOPEDIC SURGERY   Activity begin POD 0 OOB to chair TID for meal, ambulate in room, progressing to ambulate in [place]    WBAT LLE    POC glucose qac and HS    Incentive spirometry     Abduction pillow    VS q4h x 36 h    [facility] [place]     Network Utilization Review Department  ATTENTION: Please call with any questions or concerns to [phone] and carefully listen to the prompts so that you are directed to the right person. All voicemails are confidential.   For Discharge needs, contact Care Management DC Support Team at [phone] opt. 2  Send all requests for admission clinical reviews, approved or denied determinations and any other requests to dedicated fax number below belonging to the campus where the patient is receiving treatment. List of dedicated fax numbers for the Facilities:  FACILITY NAME UR FAX NUMBER   ADMISSION DENIALS (Administrative/Medical Necessity) [phone]   DISCHARGE SUPPORT TEAM (NETWORK) [phone]   PARENT CHILD HEALTH (Maternity/NICU/Pediatrics) [phone]   [facility] [phone]   [facility] [phone]   [facility] [phone]   General acute hospital [phone]   [facility] [phone]   [facility] [phone]   [facility] [phone]   [facility] [phone]   [facility] [phone]   [facility] [phone]    [facility] [phone]   [facility] [phone]

## 2024-08-18 NOTE — CASE MANAGEMENT
Case Management Discharge Planning Note    Patient name Willa Blair  Location [facility] /E2 -* MRN [id number]  : 1975 Date 2024       Current Admission Date: 2024  Current Admission Diagnosis:Fracture of left hip (HCC)   Patient Active Problem List    Diagnosis Date Noted Date Diagnosed    Fracture of left hip (HCC) 2024     Alcohol use 2024     Morbidly obese (HCC) 2022     Type 2 diabetes mellitus with obesity  (HCC) 2021     Primary hypertension 2021     Elevated troponin 2021       LOS (days): 0  Geometric Mean LOS (GMLOS) (days):   Days to GMLOS:     OBJECTIVE:            Current admission status: Inpatient   Preferred Pharmacy:   PATIENT/FAMILY REPORTS NO PREFERRED PHARMACY  No address on file      [id number] - [place] - [address]  [address] [place] [address] [address]  [facility] [address]  Phone: [phone] Fax: [phone]    [id number] - [place] - [address] [facility]  [address] [place] [address]  Phone: [phone] Fax: [phone]    [id number] [place]  [address] [facility]  [address] [place] [address]  Phone: [phone] Fax: [phone]    Primary Care Provider: No primary care provider on file.    Primary Insurance:   Secondary Insurance:     DISCHARGE DETAILS:    Discharge planning discussed with:: Pt  Freedom of Choice: Yes  Comments - Freedom of Choice: Pt agreeable to STR and blanket referral which was sent via [employer].  CM contacted family/caregiver?: No- see comments (Pt declined, speaking on phone with daughter during CM visit.)  Were Treatment Team discharge recommendations reviewed with patient/caregiver?: Yes  Did patient/caregiver verbalize understanding of patient care needs?: N/A- going to facility                      Other Referral/Resources/Interventions Provided:  Interventions: Short Term Rehab, SNF  Referral  "Comments: Pt reports to have [employer] insurance and was able to produce an insurance card. Card taken to ED reg for uploading. Per reg, pt with note \"do not bill insurance.\" Reg unable to upload insurnace info at thsi time. Advised to f/u with billing on Monday.         Treatment Team Recommendation: Short Term Rehab, SNF                                                                   "

## 2024-08-18 NOTE — PHYSICAL THERAPY NOTE
PT EVALUATION    Pt. Name: Willa Blair  Pt. Age: 48 y.o.  MRN: [id number]  LENGTH OF STAY: 0      Admitting Diagnoses:   Hip pain [M25.559]  Closed intertrochanteric fracture of hip, left, initial encounter ([facility]) [S72.142A]    Past Medical History:   Diagnosis Date    Diabetes mellitus ([facility])     Hypertension        Past Surgical History:   Procedure Laterality Date     SECTION      FINGER SURGERY      GASTRIC BYPASS LAPAROSCOPIC      in [facility], 2023       Imaging Studies:  XR hip/pelv 2-3 vws left   ED Interpretation by Riaz Jimenes Jr., DO ([id number])   On my interpretation:  Left intertrochanteric displaced hip fracture      Final Result by Arash Miranda MD ([id number])      Acute left femoral neck fracture.         Computerized Assisted Algorithm (CAA) may have been used to analyze all applicable images.            Workstation performed: [id number]         XR chest 1 view portable   ED Interpretation by Riaz Jimenes Jr., DO ([id number])   The CXR was ordered by myself and interpreted by me independently.  On my read, it appears without acute abnormalities:  - The  cardiomediastinal  silhouette  is  unremarkable.    - The  lungs  are  clear.  - No  pleural  effusions.  - No  pneumothorax.  - The  pulmonary  vasculature  is  within  normal  limits.    - The  trachea  is  midline.    - Bony  thorax  is  unremarkable.                Final Result by Riaz Penny MD ( [id number])      No acute cardiopulmonary disease.      Findings are stable      Workstation performed: [id number]         XR pelvis ap only 1 or 2 vw    (Results Pending)         24 08   PT Last Visit   PT Visit Date 24   Note Type   Note type Evaluation   Pain Assessment   Pain Score 2   Pain Location/Orientation Orientation: Left;Location: Hip   Hospital Pain Intervention(s) Medication (See MAR);Repositioned;Ambulation/increased activity;Emotional support;Rest   Restrictions/Precautions    Weight Bearing Precautions Per Order Yes   LLE Weight Bearing Per Order WBAT   Other Precautions Fall Risk;Pain;WBS;THR;Chair Alarm;Bed Alarm  (posterior hip precautions)   Home Living   Type of Home Apartment  (2nd floor apartment)   Home Layout One level;Stairs to enter with rails  (FOS to enter 2nd floor apartment)   Bathroom Shower/Tub Tub/shower unit   Bathroom Toilet Standard   Bathroom Equipment Other (Comment)  (no DME)   Home Equipment Other (Comment)  (no DME)   Additional Comments Not on home O2 at baseline   Prior Function   Level of [place] Independent with functional mobility;Independent with ADLs;Independent with IADLS  (w/o AD)   Lives With Spouse  (who works)   Receives Help From Family   Falls in the last 6 months   (1x, leading to this admission)   Vocational Full time employment   Comments (+) ; (+) times home alone   General   Family/Caregiver Present No   Cognition   Overall Cognitive Status WFL   Arousal/Participation Alert   Orientation Level Oriented X4   Following Commands Follows all commands and directions without difficulty   Comments pleasant & cooperative   Subjective   Subjective Pt agreeable to PT/OT evals.   RUE Assessment   RUE Assessment   (refer to OT)   LUE Assessment   LUE Assessment   (refer to OT)   RLE Assessment   RLE Assessment WFL  (4/5 grossly)   LLE Assessment   LLE Assessment X  (3+/5 grossly except hip limited to posterior hip precautions)   Coordination   Movements are Fluid and Coordinated 1   Sensation WFL   Bed Mobility   Supine to Sit 4  Minimal assistance   Additional items Assist x 1;HOB elevated;Bedrails;Increased time required;Verbal cues;LE management   Additional Comments cues for techniques & safety; (+) dizziness upon sitting EOB w/ BP 94/49; dizziness resolving at rest   Transfers   Sit to Stand 4  Minimal assistance   Additional items Assist x 1;Increased time required;Verbal cues;Bedrails   Stand to Sit 4  Minimal assistance   Additional  items Assist x 1;Armrests;Increased time required;Verbal cues   Additional Comments cues for techniques & safety   Ambulation/Elevation   Gait pattern Antalgic;Wide KASSIDY;Decreased foot clearance;Decreased L stance;Short stride;Step to;Excessively slow   Gait Assistance 4  Minimal assist   Additional items Assist x 1;Verbal cues;Tactile cues   Assistive Device Rolling walker   Distance 5'x1   Ambulation/Elevation Additional Comments unsteady gait but no gross LOB noted; (+) dizziness immediately after amb w/ /52 w/ BLE elevated in recliner   Balance   Static Sitting Good   Dynamic Sitting Fair +   Static Standing Fair -  (w/ RW)   Dynamic Standing Poor +  (w/ RW)   Ambulatory Poor +  (w/ RW)   Endurance Deficit   Endurance Deficit Yes   Endurance Deficit Description pain; dizziness   Activity Tolerance   Activity Tolerance Patient limited by fatigue;Patient limited by pain;Treatment limited secondary to medical complications (Comment)   Medical Staff Made Aware OTR Neetu   Nurse Made Aware yes, Kristi   Assessment   Prognosis Good   Problem List Decreased strength;Decreased range of motion;Decreased endurance;Impaired balance;Decreased mobility;Pain;Orthopedic restrictions   Assessment Pt. 48 y.o.female presented after a mechanical fall at home. Pt admitted for Fracture of left hip ([facility]) w/ Closed intertrochanteric fracture of hip, left, initial encounter ([facility]) (S72.857A). S/p  L THR on 8/17 . Pt referred to PT for mobility assessment & D/C planning w/ orders of Activity as tolerated, WBAT LLE & (+) posterior hip precautions . Please see above for information re: home set-up & PLOF as well as objective findings during PT assessment. PTA, pt reports being I w/o AD. On eval, pt functioning below baseline hence will continue skilled PT to improve function & safety. Pt require minAx1 for most functional mobility w/ RW + cues for techniques & safety. Gait deviations as above but no gross LOB noted. Dec amb tolerance  2* to weakness, fatigue & dizziness. (+) dizziness upon sitting at EOB w/ BP 94/49. Pt's dizziness resolving while resting hence able to complete transfers & brief amb. However, pt reports of inc dizziness immediately after amb hence elevated BLE in recliner w/ /52. Dizziness resolved after resting & BLE elevation. /58 at end session while sitting in recliner w/ BLE elevated. RN made aware.  The patient's AM-PAC Basic Mobility Inpatient Short Form Raw Score is 17. A Raw score of greater than 16 suggests the patient may benefit from discharge to home. Please also refer to the recommendation of the Physical Therapist for safe discharge planning. From PT standpoint, will recommend Level I (maximun resource intensity) rehab services at D/C, pending progress. No SOB reported t/o session. SpO2 91% at RA. Nsg staff most recent vital signs as follows: /66 (BP Location: Right arm)   Pulse 67   Temp 97.5 °F (36.4 °C) (Temporal)   Resp 16   Wt 75.5 kg (166 lb 7.2 oz)   SpO2 100%   BMI 32.51 kg/m² . At end of session, pt OOB in chair in stable condition, call bell & phone in reach, chair alarm activated, all lines intact. Fall precautions reinforced w/ good understanding. CM to follow. Nsg staff to continue to mobilized pt (OOB in chair for all meals & ambulate in room) as tolerated to prevent further decline in function. Will also recommend Restorative for daily amb &/or daily OOB in chair as appropriate. Nsg notified. Co-eval was necessary to complete this PT eval for the pt's best interest given pt's medical acuity & complexity.   Barriers to Discharge Inaccessible home environment;Decreased caregiver support   Barriers to Discharge Comments (+) stairs; (+) times home alone   Goals   Patient Goals to get better   STG Expiration Date 08/28/24   Short Term Goal #1 Goals to be met in 10 days; pt will be able to: 1) inc strength & balance by 1/2 grade to improve overall functional mobility & dec fall  risk; 2) inc bed mobility to modified I for pt to be able to get in/OOB safely w/ proper techniques 100% of the time, to dec caregiver burden & safely function at home; 3) inc transfers to modified I for pt to transition safely from one surface to another w/o % of the time, to dec caregiver burden & safely function at home; 4) inc amb w/ RW approx. >150' w/ S for pt to ambulate household distances w/o any % of the time, to dec caregiver burden & safely function at home; 5) negotiate stairs w/ S for inc safety during stair mgt inside/outside of home & dec caregiver burden; 6) pt/caregiver ed   PT Treatment Day 0   Plan   Treatment/Interventions Functional transfer training;LE strengthening/ROM;Elevations;Therapeutic exercise;Endurance training;Patient/family training;Bed mobility;Gait training;Spoke to nursing;OT   PT Frequency 4-6x/wk   Discharge Recommendation   Rehab Resource Intensity Level, PT I (Maximum Resource Intensity)  (pending progress)   Equipment Recommended Walker   Walker Package Recommended Wheeled walker   AM-PAC Basic Mobility Inpatient   Turning in Flat Bed Without Bedrails 3   Lying on Back to Sitting on Edge of Flat Bed Without Bedrails 3   Moving Bed to Chair 3   Standing Up From Chair Using Arms 3   Walk in Room 3   Climb 3-5 Stairs With Railing 2   Basic Mobility Inpatient Raw Score 17   Basic Mobility Standardized Score 39.67   [facility] Highest Level Of Mobility   -[facility] Goal 5: Stand one or more mins   -HL Achieved 5: Stand (1 or more minutes)   End of Consult   Patient Position at End of Consult Bedside chair;Bed/Chair alarm activated;All needs within reach   End of Consult Comments Pt in stable condition. All needs in reach.   Hx/personal factors: co-morbidities, inaccessible home, dec caregiver support, mutliple lines, use of AD, pain, total hip precautions, h/o of falls, fall risk, and assist w/ ADL's  Examination: dec mobility, dec balance, dec endurance, dec amb,  risk for falls, pain  Clinical: unpredictable (ongoing medical status, abnormal lab values, and risk for falls)  Complexity: high    [place]

## 2024-08-19 LAB
ERYTHROCYTE [DISTWIDTH] IN BLOOD BY AUTOMATED COUNT: 12.8 % (ref 11.6–15.1)
GLUCOSE SERPL-MCNC: 145 MG/DL (ref 65–140)
GLUCOSE SERPL-MCNC: 155 MG/DL (ref 65–140)
GLUCOSE SERPL-MCNC: 164 MG/DL (ref 65–140)
GLUCOSE SERPL-MCNC: 241 MG/DL (ref 65–140)
HCT VFR BLD AUTO: 38.3 % (ref 34.8–46.1)
HGB BLD-MCNC: 12.3 G/DL (ref 11.5–15.4)
MCH RBC QN AUTO: 30 PG (ref 26.8–34.3)
MCHC RBC AUTO-ENTMCNC: 32.1 G/DL (ref 31.4–37.4)
MCV RBC AUTO: 93 FL (ref 82–98)
PLATELET # BLD AUTO: 213 THOUSANDS/UL (ref 149–390)
PMV BLD AUTO: 11.3 FL (ref 8.9–12.7)
RBC # BLD AUTO: 4.1 MILLION/UL (ref 3.81–5.12)
WBC # BLD AUTO: 14.01 THOUSAND/UL (ref 4.31–10.16)

## 2024-08-19 PROCEDURE — 97110 THERAPEUTIC EXERCISES: CPT

## 2024-08-19 PROCEDURE — 99024 POSTOP FOLLOW-UP VISIT: CPT | Performed by: PHYSICIAN ASSISTANT

## 2024-08-19 PROCEDURE — 82948 REAGENT STRIP/BLOOD GLUCOSE: CPT

## 2024-08-19 PROCEDURE — 97530 THERAPEUTIC ACTIVITIES: CPT

## 2024-08-19 PROCEDURE — 99232 SBSQ HOSP IP/OBS MODERATE 35: CPT | Performed by: STUDENT IN AN ORGANIZED HEALTH CARE EDUCATION/TRAINING PROGRAM

## 2024-08-19 PROCEDURE — 85027 COMPLETE CBC AUTOMATED: CPT | Performed by: PHYSICIAN ASSISTANT

## 2024-08-19 RX ORDER — OXYCODONE HYDROCHLORIDE 10 MG/1
10 TABLET ORAL EVERY 4 HOURS PRN
Status: DISCONTINUED | OUTPATIENT
Start: 2024-08-19 | End: 2024-08-20

## 2024-08-19 RX ORDER — OXYCODONE HYDROCHLORIDE 5 MG/1
5 TABLET ORAL EVERY 4 HOURS PRN
Status: DISCONTINUED | OUTPATIENT
Start: 2024-08-19 | End: 2024-08-20

## 2024-08-19 RX ADMIN — FOLIC ACID 1 MG: 1 TABLET ORAL at 09:55

## 2024-08-19 RX ADMIN — DOCUSATE SODIUM 100 MG: 100 CAPSULE, LIQUID FILLED ORAL at 16:46

## 2024-08-19 RX ADMIN — ACETAMINOPHEN 975 MG: 325 TABLET ORAL at 21:31

## 2024-08-19 RX ADMIN — OXYCODONE HYDROCHLORIDE 5 MG: 5 TABLET ORAL at 04:31

## 2024-08-19 RX ADMIN — OXYCODONE HYDROCHLORIDE 10 MG: 10 TABLET ORAL at 10:01

## 2024-08-19 RX ADMIN — Medication 1 TABLET: at 09:55

## 2024-08-19 RX ADMIN — ACETAMINOPHEN 975 MG: 325 TABLET ORAL at 05:57

## 2024-08-19 RX ADMIN — OXYCODONE HYDROCHLORIDE 10 MG: 10 TABLET ORAL at 21:31

## 2024-08-19 RX ADMIN — INSULIN LISPRO 1 UNITS: 100 INJECTION, SOLUTION INTRAVENOUS; SUBCUTANEOUS at 09:55

## 2024-08-19 RX ADMIN — THIAMINE HCL TAB 100 MG 100 MG: 100 TAB at 09:55

## 2024-08-19 RX ADMIN — INSULIN LISPRO 2 UNITS: 100 INJECTION, SOLUTION INTRAVENOUS; SUBCUTANEOUS at 16:41

## 2024-08-19 RX ADMIN — INSULIN LISPRO 1 UNITS: 100 INJECTION, SOLUTION INTRAVENOUS; SUBCUTANEOUS at 11:57

## 2024-08-19 RX ADMIN — DIPHENHYDRAMINE HYDROCHLORIDE 25 MG: 25 TABLET ORAL at 16:52

## 2024-08-19 RX ADMIN — ENOXAPARIN SODIUM 40 MG: 40 INJECTION SUBCUTANEOUS at 09:55

## 2024-08-19 RX ADMIN — SENNOSIDES 8.6 MG: 8.6 TABLET, FILM COATED ORAL at 21:31

## 2024-08-19 RX ADMIN — DOCUSATE SODIUM 100 MG: 100 CAPSULE, LIQUID FILLED ORAL at 09:55

## 2024-08-19 RX ADMIN — OXYCODONE HYDROCHLORIDE 10 MG: 10 TABLET ORAL at 14:00

## 2024-08-19 RX ADMIN — FAMOTIDINE 20 MG: 20 TABLET, FILM COATED ORAL at 09:55

## 2024-08-19 RX ADMIN — PRAVASTATIN SODIUM 20 MG: 20 TABLET ORAL at 16:46

## 2024-08-19 RX ADMIN — ACETAMINOPHEN 975 MG: 325 TABLET ORAL at 14:00

## 2024-08-19 RX ADMIN — FAMOTIDINE 20 MG: 20 TABLET, FILM COATED ORAL at 16:52

## 2024-08-19 NOTE — PLAN OF CARE
Problem: PAIN - ADULT  Goal: Verbalizes/displays adequate comfort level or baseline comfort level  Description: Interventions:  - Encourage patient to monitor pain and request assistance  - Assess pain using appropriate pain scale  - Administer analgesics based on type and severity of pain and evaluate response  - Implement non-pharmacological measures as appropriate and evaluate response  - Consider cultural and social influences on pain and pain management  - Notify physician/advanced practitioner if interventions unsuccessful or patient reports new pain  Outcome: Progressing     Problem: SAFETY ADULT  Goal: Patient will remain free of falls  Description: INTERVENTIONS:  - Educate patient/family on patient safety including physical limitations  - Instruct patient to call for assistance with activity   - Consult OT/PT to assist with strengthening/mobility   - Keep Call bell within reach  - Keep bed low and locked with side rails adjusted as appropriate  - Keep care items and personal belongings within reach  - Initiate and maintain comfort rounds  - Make Fall Risk Sign visible to staff  - Offer Toileting every 2 Hours, in advance of need  - Initiate/Maintain bed alarm  - Obtain necessary fall risk management equipment: gripper socks and call bell  - Apply yellow socks and bracelet for high fall risk patients  - Consider moving patient to room near nurses station  Outcome: Progressing  Goal: Maintain or return to baseline ADL function  Description: INTERVENTIONS:  -  Assess patient's ability to carry out ADLs; assess patient's baseline for ADL function and identify physical deficits which impact ability to perform ADLs (bathing, care of mouth/teeth, toileting, grooming, dressing, etc.)  - Assess/evaluate cause of self-care deficits   - Assess range of motion  - Assess patient's mobility; develop plan if impaired  - Assess patient's need for assistive devices and provide as appropriate  - Encourage maximum  independence but intervene and supervise when necessary  - Involve family in performance of ADLs  - Assess for home care needs following discharge   - Consider OT consult to assist with ADL evaluation and planning for discharge  - Provide patient education as appropriate  Outcome: Progressing  Goal: Maintains/Returns to pre admission functional level  Description: INTERVENTIONS:  - Perform AM-PAC 6 Click Basic Mobility/ Daily Activity assessment daily.  - Set and communicate daily mobility goal to care team and patient/family/caregiver.   - Collaborate with rehabilitation services on mobility goals if consulted  - Perform Range of Motion 3 times a day.  - Reposition patient every 2 hours.  - Dangle patient 3 times a day  - Stand patient 3 times a day  - Ambulate patient 3 times a day  - Out of bed to chair 3 times a day   - Out of bed for meals 3 times a day  - Out of bed for toileting  - Record patient progress and toleration of activity level   Outcome: Progressing     Problem: DISCHARGE PLANNING  Goal: Discharge to home or other facility with appropriate resources  Description: INTERVENTIONS:  - Identify barriers to discharge w/patient and caregiver  - Arrange for needed discharge resources and transportation as appropriate  - Identify discharge learning needs (meds, wound care, etc.)  - Arrange for interpretive services to assist at discharge as needed  - Refer to Case Management Department for coordinating discharge planning if the patient needs post-hospital services based on physician/advanced practitioner order or complex needs related to functional status, cognitive ability, or social support system  Outcome: Progressing     Problem: Knowledge Deficit  Goal: Patient/family/caregiver demonstrates understanding of disease process, treatment plan, medications, and discharge instructions  Description: Complete learning assessment and assess knowledge base.  Interventions:  - Provide teaching at level of  understanding  - Provide teaching via preferred learning methods  Outcome: Progressing     Problem: MUSCULOSKELETAL - ADULT  Goal: Maintain or return mobility to safest level of function  Description: INTERVENTIONS:  - Assess patient's ability to carry out ADLs; assess patient's baseline for ADL function and identify physical deficits which impact ability to perform ADLs (bathing, care of mouth/teeth, toileting, grooming, dressing, etc.)  - Assess/evaluate cause of self-care deficits   - Assess range of motion  - Assess patient's mobility  - Assess patient's need for assistive devices and provide as appropriate  - Encourage maximum independence but intervene and supervise when necessary  - Involve family in performance of ADLs  - Assess for home care needs following discharge   - Consider OT consult to assist with ADL evaluation and planning for discharge  - Provide patient education as appropriate  Outcome: Progressing  Goal: Maintain proper alignment of affected body part  Description: INTERVENTIONS:  - Support, maintain and protect limb and body alignment  - Provide patient/ family with appropriate education  Outcome: Progressing     Problem: Prexisting or High Potential for Compromised Skin Integrity  Goal: Skin integrity is maintained or improved  Description: INTERVENTIONS:  - Identify patients at risk for skin breakdown  - Assess and monitor skin integrity  - Assess and monitor nutrition and hydration status  - Monitor labs   - Assess for incontinence   - Turn and reposition patient  - Assist with mobility/ambulation  - Relieve pressure over bony prominences  - Avoid friction and shearing  - Provide appropriate hygiene as needed including keeping skin clean and dry  - Evaluate need for skin moisturizer/barrier cream  - Collaborate with interdisciplinary team   - Patient/family teaching  - Consider wound care consult   Outcome: Progressing

## 2024-08-19 NOTE — ASSESSMENT & PLAN NOTE
She notes she has not been taking medications at home  Previous medication is amlodipine 5 mg daily  Holding with soft BP, may need to discontinue on discharge

## 2024-08-19 NOTE — PLAN OF CARE
Problem: PAIN - ADULT  Goal: Verbalizes/displays adequate comfort level or baseline comfort level  Description: Interventions:  - Encourage patient to monitor pain and request assistance  - Assess pain using appropriate pain scale  - Administer analgesics based on type and severity of pain and evaluate response  - Implement non-pharmacological measures as appropriate and evaluate response  - Consider cultural and social influences on pain and pain management  - Notify physician/advanced practitioner if interventions unsuccessful or patient reports new pain  Outcome: Progressing     Problem: SAFETY ADULT  Goal: Patient will remain free of falls  Description: INTERVENTIONS:  - Educate patient/family on patient safety including physical limitations  - Instruct patient to call for assistance with activity   - Consult OT/PT to assist with strengthening/mobility   - Keep Call bell within reach  - Keep bed low and locked with side rails adjusted as appropriate  - Keep care items and personal belongings within reach  - Initiate and maintain comfort rounds  - Make Fall Risk Sign visible to staff  - Offer Toileting every 2 Hours, in advance of need  - Initiate/Maintain bed alarm  - Apply yellow socks and bracelet for high fall risk patients  - Consider moving patient to room near nurses station  Outcome: Progressing  Goal: Maintain or return to baseline ADL function  Description: INTERVENTIONS:  -  Assess patient's ability to carry out ADLs; assess patient's baseline for ADL function and identify physical deficits which impact ability to perform ADLs (bathing, care of mouth/teeth, toileting, grooming, dressing, etc.)  - Assess/evaluate cause of self-care deficits   - Assess range of motion  - Assess patient's mobility; develop plan if impaired  - Assess patient's need for assistive devices and provide as appropriate  - Encourage maximum independence but intervene and supervise when necessary  - Involve family in performance  of ADLs  - Assess for home care needs following discharge   - Consider OT consult to assist with ADL evaluation and planning for discharge  - Provide patient education as appropriate  Outcome: Progressing  Goal: Maintains/Returns to pre admission functional level  Description: INTERVENTIONS:  - Perform AM-PAC 6 Click Basic Mobility/ Daily Activity assessment daily.  - Set and communicate daily mobility goal to care team and patient/family/caregiver.   - Collaborate with rehabilitation services on mobility goals if consulted  - Perform Range of Motion 3 times a day.  - Reposition patient every 2 hours.  - Dangle patient 3 times a day  - Stand patient 3 times a day  - Ambulate patient 3 times a day  - Out of bed to chair 3 times a day   - Out of bed for meals 3 times a day  - Out of bed for toileting  - Record patient progress and toleration of activity level   Outcome: Progressing     Problem: DISCHARGE PLANNING  Goal: Discharge to home or other facility with appropriate resources  Description: INTERVENTIONS:  - Identify barriers to discharge w/patient and caregiver  - Arrange for needed discharge resources and transportation as appropriate  - Identify discharge learning needs (meds, wound care, etc.)  - Arrange for interpretive services to assist at discharge as needed  - Refer to Case Management Department for coordinating discharge planning if the patient needs post-hospital services based on physician/advanced practitioner order or complex needs related to functional status, cognitive ability, or social support system  Outcome: Progressing     Problem: Knowledge Deficit  Goal: Patient/family/caregiver demonstrates understanding of disease process, treatment plan, medications, and discharge instructions  Description: Complete learning assessment and assess knowledge base.  Interventions:  - Provide teaching at level of understanding  - Provide teaching via preferred learning methods  Outcome: Progressing      Problem: MUSCULOSKELETAL - ADULT  Goal: Maintain or return mobility to safest level of function  Description: INTERVENTIONS:  - Assess patient's ability to carry out ADLs; assess patient's baseline for ADL function and identify physical deficits which impact ability to perform ADLs (bathing, care of mouth/teeth, toileting, grooming, dressing, etc.)  - Assess/evaluate cause of self-care deficits   - Assess range of motion  - Assess patient's mobility  - Assess patient's need for assistive devices and provide as appropriate  - Encourage maximum independence but intervene and supervise when necessary  - Involve family in performance of ADLs  - Assess for home care needs following discharge   - Consider OT consult to assist with ADL evaluation and planning for discharge  - Provide patient education as appropriate  Outcome: Progressing  Goal: Maintain proper alignment of affected body part  Description: INTERVENTIONS:  - Support, maintain and protect limb and body alignment  - Provide patient/ family with appropriate education  Outcome: Progressing     Problem: Prexisting or High Potential for Compromised Skin Integrity  Goal: Skin integrity is maintained or improved  Description: INTERVENTIONS:  - Identify patients at risk for skin breakdown  - Assess and monitor skin integrity  - Assess and monitor nutrition and hydration status  - Monitor labs   - Assess for incontinence   - Turn and reposition patient  - Assist with mobility/ambulation  - Relieve pressure over bony prominences  - Avoid friction and shearing  - Provide appropriate hygiene as needed including keeping skin clean and dry  - Evaluate need for skin moisturizer/barrier cream  - Collaborate with interdisciplinary team   - Patient/family teaching  - Consider wound care consult   Outcome: Progressing

## 2024-08-19 NOTE — UTILIZATION REVIEW
NOTIFICATION OF INPATIENT ADMISSION   AUTHORIZATION REQUEST   SERVICING FACILITY:   [place] [address]  Tax ID: [id number]  NPI: [id number] ATTENDING PROVIDER:  Attending Name and NPI#: Sam Cyr Md [[id number]]  Address: [address] [place] [address]  Phone: [phone]     ADMISSION INFORMATION:  Place of Service: Inpatient [facility]  Place of Service Code: 21  Inpatient Admission Date/Time: 8/18/24  2:07 PM  Discharge Date/Time: No discharge date for patient encounter.  Admitting Diagnosis Code/Description:  Hip pain [M25.559]  Closed intertrochanteric fracture of hip, left, initial encounter ([facility]) [S72.142A]     UTILIZATION REVIEW CONTACT:  Suzie Galeas Utilization   Network Utilization Review Department  Phone: [phone]  Fax [phone]  Email: [email or web address]  Contact for approvals/pending authorizations, clinical reviews, and discharge.     PHYSICIAN ADVISORY SERVICES:  Medical Necessity Denial & Aysw-fk-Utke Review  Phone: [phone]  Fax: [phone]  Email: [email or web address]     DISCHARGE SUPPORT TEAM:  For Patients Discharge Needs & Updates  Phone: [phone] opt. 2 Fax: [phone]  Email: [email or web address]

## 2024-08-19 NOTE — PROGRESS NOTES
[facility]  Progress Note  Name: Willa Blair I  MRN: [id number]  Unit/Bed#: E2 -01 I Date of Admission: 2024   Date of Service: 2024 I Hospital Day: 1    Assessment & Plan   Alcohol use  Assessment & Plan  Denies daily drinking, history of alcohol abuse/withdrawal  Continue MVI, thiamine, folate  Monitor for withdrawal symptoms    Primary hypertension  Assessment & Plan  She notes she has not been taking medications at home  Previous medication is amlodipine 5 mg daily  Holding with soft BP, may need to discontinue on discharge    Type 2 diabetes mellitus with obesity  (HCC)  Assessment & Plan  She notes she is not taking diabetic medications.  Previous history notes patient being on Tresiba 30 units nightly as well as Januvia and oral semaglutide  Her sugars here are currently well-controlled  A1c 7.7  Can be discharged on metformin at discharge given low A1c    * Fracture of left hip (HCC)  Assessment & Plan  After mechanical falls in setting of alcohol intoxication, no head strike   POD1 s/p left JOSSE- posterolateral approach by Dr. Godfrey for femoral neck fracture   Lovenox DVT prophylaxis, 40 mg x 30 days  PT OT recommending level 1 resource intensity  Continue pain control  Case mgmt following, planning for STR             VTE Pharmacologic Prophylaxis:   Pharmacologic: Enoxaparin (Lovenox)  Mechanical VTE Prophylaxis in Place: Yes      Current Length of Stay: 1 day(s)    Current Patient Status: Inpatient   Certification Statement: The patient will continue to require additional inpatient hospital stay due to pending placement    Discharge Plan: pending    Code Status: Level 1 - Full Code      Subjective:   Pain better controlled today. Denies any fevers, chills, CP or SOB. Tolerating diet.     Objective:     Vitals:   Temp (24hrs), Av.1 °F (36.7 °C), Min:97.3 °F (36.3 °C), Max:98.7 °F (37.1 °C)    Temp:  [97.3 °F (36.3 °C)-98.7 °F (37.1 °C)] 98 °F (36.7  °C)  HR:  [50-67] 60  Resp:  [18] 18  BP: ()/(42-65) 90/42  SpO2:  [96 %-99 %] 96 %  Body mass index is 32.51 kg/m².     Input and Output Summary (last 24 hours):       Intake/Output Summary (Last 24 hours) at 8/19/2024 1552  Last data filed at 8/19/2024 1300  Gross per 24 hour   Intake 600 ml   Output 675 ml   Net -75 ml       Physical Exam:     Physical Exam  Vitals and nursing note reviewed.   HENT:      Head: Normocephalic.   Eyes:      Conjunctiva/sclera: Conjunctivae normal.   Cardiovascular:      Rate and Rhythm: Normal rate.   Pulmonary:      Effort: Pulmonary effort is normal. No respiratory distress.   Abdominal:      General: Bowel sounds are normal.      Palpations: Abdomen is soft.   Musculoskeletal:         General: No swelling.      Right lower leg: No edema.      Left lower leg: No edema.   Skin:     General: Skin is warm and dry.   Neurological:      General: No focal deficit present.      Mental Status: She is alert. Mental status is at baseline.         Additional Data:     Labs:    Results from last 7 days   Lab Units 08/19/24  1002 08/17/24  0938 08/17/24  0358   WBC Thousand/uL 14.01*   < > 7.89   HEMOGLOBIN g/dL 12.3   < > 13.3   HEMATOCRIT % 38.3   < > 41.9   PLATELETS Thousands/uL 213   < > 233   SEGS PCT %  --   --  58   LYMPHO PCT %  --   --  37   MONO PCT %  --   --  3*   EOS PCT %  --   --  1    < > = values in this interval not displayed.     Results from last 7 days   Lab Units 08/18/24  0448 08/17/24  0358   SODIUM mmol/L 135 138   POTASSIUM mmol/L 4.9 4.3   CHLORIDE mmol/L 102 107   CO2 mmol/L 27 22   BUN mg/dL 22 28*   CREATININE mg/dL 0.83 0.99   ANION GAP mmol/L 6 9   CALCIUM mg/dL 9.1 8.8   ALBUMIN g/dL  --  3.7   TOTAL BILIRUBIN mg/dL  --  0.45   ALK PHOS U/L  --  65   ALT U/L  --  16   AST U/L  --  18   GLUCOSE RANDOM mg/dL 236* 202*     Results from last 7 days   Lab Units 08/17/24  0358   INR  1.01     Results from last 7 days   Lab Units 08/19/24  1543 08/19/24  1121  08/19/24  0635 08/18/24  2104 08/18/24  1547 08/18/24  1110 08/18/24  0634 08/17/24  2108 08/17/24  1631 08/17/24  1118 08/17/24  0729   POC GLUCOSE mg/dl 241* 164* 155* 214* 131 194* 187* 216* 182* 122 132     Results from last 7 days   Lab Units 08/17/24  0938   HEMOGLOBIN A1C % 7.7*               * I Have Reviewed All Lab Data Listed Above.  * Additional Pertinent Lab Tests Reviewed: All Labs For Current Hospital Admission Reviewed    Mobility:  Basic Mobility Inpatient Raw Score: 18  -[facility] Goal: 6: Walk 10 steps or more  -[facility] Achieved: 3: Sit at edge of bed    Lines:     Invasive Devices       Peripheral Intravenous Line  Duration             Peripheral IV 08/18/24 Right Antecubital <1 day                       Imaging:    Imaging Reports Reviewed Today Include:     XR pelvis ap only 1 or 2 vw    Result Date: 8/19/2024  Impression: Unremarkable total left hip arthroplasty. Computerized Assisted Algorithm (CAA) may have been used to analyze all applicable images. Workstation performed: [id number]     XR hip/pelv 2-3 vws left    Result Date: 8/18/2024  Impression: Acute left femoral neck fracture. Computerized Assisted Algorithm (CAA) may have been used to analyze all applicable images. Workstation performed: [id number]     XR chest 1 view portable    Result Date: 8/17/2024  Impression: No acute cardiopulmonary disease. Findings are stable Workstation performed: [id number]        Recent Cultures (last 7 days):           Last 24 Hours Medication List:   Current Facility-Administered Medications   Medication Dose Route Frequency Provider Last Rate    acetaminophen  975 mg Oral Q8H ROSLYN Leann Gloria, PA-C      bisacodyl  10 mg Rectal Daily PRN Leannpoornima Castro, PA-C      diphenhydrAMINE  25 mg Oral Q6H PRN Don Adrian PA-C      docusate sodium  100 mg Oral BID Leann Gloria, PA-FRITZ      enoxaparin  40 mg Subcutaneous Q24H ROSLYN Leann Gloria, PA-C      famotidine  20 mg Oral BID Leann Gloria, PA-C      folic acid  1 mg Oral Daily  Leann Castro PA-C      hydrALAZINE  10 mg Intravenous Q6H PRN DANIA Koenig-FRITZ      insulin lispro  1-5 Units Subcutaneous 4x Daily (AC & HS) DANIA Koenig-C      multivitamin-minerals  1 tablet Oral Daily Leann Castro, PA-FRITZ      ondansetron  4 mg Oral Q6H PRN Leann Castro PA-C      oxyCODONE  5 mg Oral Q4H PRN Sam Cyr MD      Or    oxyCODONE  10 mg Oral Q4H PRN Sam Cyr MD      pravastatin  20 mg Oral Daily With Dinner Leann Castro PA-C      senna  1 tablet Oral HS Leann Castro PA-C      thiamine  100 mg Oral Daily Leann Castro PA-C          Today, Patient Was Seen By: Sam Cyr MD    ** Please Note: Dictation voice to text software may have been used in the creation of this document. **

## 2024-08-19 NOTE — ASSESSMENT & PLAN NOTE
Denies daily drinking, history of alcohol abuse/withdrawal  Continue MVI, thiamine, folate  Monitor for withdrawal symptoms

## 2024-08-19 NOTE — PLAN OF CARE
Problem: PHYSICAL THERAPY ADULT  Goal: Performs mobility at highest level of function for planned discharge setting.  See evaluation for individualized goals.  Description: Treatment/Interventions: Functional transfer training, LE strengthening/ROM, Elevations, Therapeutic exercise, Endurance training, Patient/family training, Bed mobility, Gait training, Spoke to nursing, OT  Equipment Recommended: Walker       See flowsheet documentation for full assessment, interventions and recommendations.  Outcome: Progressing  Note: Prognosis: Good  Problem List: Decreased strength, Decreased range of motion, Decreased endurance, Decreased mobility, Obesity, Orthopedic restrictions, Pain, Decreased skin integrity  Assessment: Pt. limited in her mobility this session due to orthostatic hypotension. Pt. able to perform supine and seated LE Lazarus AROM of RLE and AAROM of LLE. Pt. is very anxious about her pain. Pt. reported dizziness and feeling warm once seated at the EOB. BP readings as follows in sitting 86/54, post LE Lazarus in sitting 92/51 and as she sat longer pt. was feeling wors and unable to get the BP readings with multiple trials. Soon after laying down BP read 81/58 and after few minutes in supine 99/46. RN was notified of all BP readings and patient's symptoms. Due to drop in BP and symptoms patient was not ambulated. Pt. was given ice pack for L hip at the end of the session. Will continue to follow pt. per PT POC.  Barriers to Discharge: None  Barriers to Discharge Comments: (+) stairs; (+) times home alone  Rehab Resource Intensity Level, PT: I (Maximum Resource Intensity) (vs Home with family support and PT pending progress)    See flowsheet documentation for full assessment.

## 2024-08-19 NOTE — ASSESSMENT & PLAN NOTE
After mechanical falls in setting of alcohol intoxication, no head strike   POD1 s/p left JOSSE- posterolateral approach by Dr. Godfrey for femoral neck fracture   Lovenox DVT prophylaxis, 40 mg x 30 days  PT OT recommending level 1 resource intensity  Continue pain control  Case mgmt following, planning for STR

## 2024-08-19 NOTE — CASE MANAGEMENT
Case Management Assessment    Patient name Willa Blair  Location [facility] 2 /E2 -* MRN [id number]  : 1975 Date 2024       Current Admission Date: 2024  Current Admission Diagnosis:Fracture of left hip (HCC)   Patient Active Problem List    Diagnosis Date Noted Date Diagnosed    Fracture of left hip (HCC) 2024     Alcohol use 2024     Morbidly obese (HCC) 2022     Type 2 diabetes mellitus with obesity  (HCC) 2021     Primary hypertension 2021     Elevated troponin 2021       LOS (days): 1  Geometric Mean LOS (GMLOS) (days):   Days to GMLOS:     OBJECTIVE:    Risk of Unplanned Readmission Score: 9.62         Current admission status: Inpatient       Preferred Pharmacy:   PATIENT/FAMILY REPORTS NO PREFERRED PHARMACY  No address on file      [id number] - [place] - [address]  [address] [place] [address] [address]  [facility] [address]  Phone: [phone] Fax: [phone]    [id number] - [place] - [address] [facility]  [address] [place] [address]  Phone: [phone] Fax: [phone]    [facility] [id number]  [place] - [address] [facility]  [address] [facility] [address]  Phone: [phone] Fax: [phone]    Primary Care Provider: No primary care provider on file.    Primary Insurance: [employer]  Secondary Insurance:     ASSESSMENT:  Active Health Care Proxies       Vinnie Graves [facility] Care Representative - Spouse   Primary Phone: [phone] (Mobile)                 Advance Directives  Does patient have a Health Care POA?: No  Was patient offered paperwork?: Yes (declined)  Does patient currently have a Health Care decision maker?: Yes, please see Health Care Proxy section  Does patient have Advance Directives?: No  Was patient offered paperwork?: Yes (declined)  Primary Contact: Vinnie Graves (spouse) [phone]              Patient  Information  Admitted from:: Home  Mental Status: Alert  During Assessment patient was accompanied by: Not accompanied during assessment  Assessment information provided by:: Patient  Primary Caregiver: Self  Support Systems: Family members, Spouse/significant other, Friends/neighbors  County of Residence: [place]  What city do you live in?: [place]  Home entry access options. Select all that apply.: Stairs  Number of steps to enter home.: 3  Do the steps have railings?: Yes  Type of Current Residence: Apartment  Floor Level: 2 (FOS to 2nd floor)  Upon entering residence, is there a bedroom on the main floor (no further steps)?: Yes  Upon entering residence, is there a bathroom on the main floor (no further steps)?: Yes  Living Arrangements: Lives w/ Spouse/significant other    Activities of Daily Living Prior to Admission  Functional Status: Independent  Completes ADLs independently?: Yes  Ambulates independently?: Yes  Does patient use assisted devices?: No  Does patient currently own DME?: No  Does patient have a history of Outpatient Therapy (PT/OT)?: No  Does the patient have a history of Short-Term Rehab?: No  Does patient have a history of HHC?: No  Does patient currently have HHC?: No         Patient Information Continued  Income Source: Employed  Does patient have prescription coverage?: Yes  Does patient receive dialysis treatments?: No  Does patient have a history of substance abuse?: Yes  Historical substance use preference: Alcohol/ETOH  Is patient currently in treatment for substance abuse?: N/A - sober (only drinks occassionally reports no heavy drinking)  Does patient have a history of Mental Health Diagnosis?: No         Means of Transportation  Means of Transport to [facility]:: Drives Self      Social Determinants of Health (SDOH)      Flowsheet Row Most Recent Value   Housing Stability    In the last 12 months, was there a time when you were not able to pay the mortgage or rent on time? N   In the  past 12 months, how many times have you moved where you were living? 0   At any time in the past 12 months, were you homeless or living in a shelter (including now)? N   Transportation Needs    In the past 12 months, has lack of transportation kept you from medical appointments or from getting medications? no   In the past 12 months, has lack of transportation kept you from meetings, work, or from getting things needed for daily living? No   Food Insecurity    Within the past 12 months, you worried that your food would run out before you got the money to buy more. Never true   Within the past 12 months, the food you bought just didn't last and you didn't have money to get more. Never true   Utilities    In the past 12 months has the electric, gas, oil, or water company threatened to shut off services in your home? No

## 2024-08-19 NOTE — PROGRESS NOTES
"Willa Blair  48 y.o.  female  MR#: [id number]  8/19/2024    Post-op days: 2  Extremity: left hip    Subjective: 48-year-old female postop day 2 status post left total hip arthroplasty performed by lateral approach for femoral neck fracture performed by Dr. Godfrey.  Patient was seen and examined at bedside.  Patient reports that she is overall feeling well this morning.  She states that as long as she does not touch the left hip she denies having any pain.  Patient states that she does have pain within the left groin but states it is to palpation.  Patient reports that yesterday she felt as though there were ants on the bottom of the left foot but today all numbness and tingling has resolved.  She states that she feels well overall denying any fevers chills or sweats.    Vitals:   Vitals:    08/19/24 0712   BP: 108/51   Pulse: 65   Resp: 18   Temp: (!) 97.3 °F (36.3 °C)   SpO2: 98%       Exam:   A&O x 3 NAD  Left hip:  Mepilex dressing clean, dry and intact  Thigh and calf compartments are soft and compressible  Actively moving ankle and toes  Sensation intact to light touch  DP 2+      Labs:   WBC   Recent Labs     08/17/24  0358 08/18/24  0448   WBC 7.89 14.89*     H/H   Recent Labs     08/17/24  0358 08/18/24  0448   HGB 13.3 13.0   /  Recent Labs     08/17/24  0358 08/18/24 0448   HCT 41.9 40.0     Sed Rate No results for input(s): \"SEDRATE\" in the last 72 hours.  CRP No results for input(s): \"CRP\" in the last 72 hours.    Assessment:   POD2 s/p left JOSSE- posterolateral approach by Dr. Godfrey for femoral neck fracture    Plan:   Weightbearing as tolerated to left lower extremity with assistance  PT/OT: Posterior hip precautions  Pain control as needed  DVT prophylaxis: Lovenox, mechanical.  Patient has allergy to aspirin. Recommend either Lovenox or Eliquis 2.5 mg twice daily x 4 weeks at discharge.  Leukocytosis on yesterday's labs, most likely reactive, this mornings blood work is pending.  Encourage " incentive spirometry.  Will continue to assess for ABLA, blood work pending, Hgb 13.0 yesterday   DC planning.  Ortho stable for discharge once cleared by primary team and PT/OT has made their discharge recommendations.  Follow-up as outpatient with Dr. Godfrey in 10 to 14 days.    Tammy Ayon PA-C

## 2024-08-19 NOTE — RESTORATIVE TECHNICIAN NOTE
Restorative Technician Note      Patient Name: Willa Blair     Restorative Tech Visit Date: 08/19/24  Note Type: Mobility  Patient Position Upon Consult: Seated edge of bed  Mobility / Activity Provided: Assisted PTA getting pt back to bed  Activity Performed: Stood; Repositioned; Dangled  Patient Position at End of Consult: All needs within reach; Supine

## 2024-08-20 LAB
ANION GAP SERPL CALCULATED.3IONS-SCNC: 5 MMOL/L (ref 4–13)
BUN SERPL-MCNC: 25 MG/DL (ref 5–25)
CALCIUM SERPL-MCNC: 8.7 MG/DL (ref 8.4–10.2)
CHLORIDE SERPL-SCNC: 102 MMOL/L (ref 96–108)
CO2 SERPL-SCNC: 30 MMOL/L (ref 21–32)
CREAT SERPL-MCNC: 1.01 MG/DL (ref 0.6–1.3)
ERYTHROCYTE [DISTWIDTH] IN BLOOD BY AUTOMATED COUNT: 12.9 % (ref 11.6–15.1)
GFR SERPL CREATININE-BSD FRML MDRD: 65 ML/MIN/1.73SQ M
GLUCOSE SERPL-MCNC: 123 MG/DL (ref 65–140)
GLUCOSE SERPL-MCNC: 136 MG/DL (ref 65–140)
GLUCOSE SERPL-MCNC: 160 MG/DL (ref 65–140)
GLUCOSE SERPL-MCNC: 225 MG/DL (ref 65–140)
HCT VFR BLD AUTO: 37.4 % (ref 34.8–46.1)
HGB BLD-MCNC: 12 G/DL (ref 11.5–15.4)
MCH RBC QN AUTO: 30.9 PG (ref 26.8–34.3)
MCHC RBC AUTO-ENTMCNC: 32.1 G/DL (ref 31.4–37.4)
MCV RBC AUTO: 96 FL (ref 82–98)
PLATELET # BLD AUTO: 206 THOUSANDS/UL (ref 149–390)
PMV BLD AUTO: 12.6 FL (ref 8.9–12.7)
POTASSIUM SERPL-SCNC: 4 MMOL/L (ref 3.5–5.3)
RBC # BLD AUTO: 3.88 MILLION/UL (ref 3.81–5.12)
SODIUM SERPL-SCNC: 137 MMOL/L (ref 135–147)
VIT B12 SERPL-MCNC: 271 PG/ML (ref 180–914)
WBC # BLD AUTO: 11.23 THOUSAND/UL (ref 4.31–10.16)

## 2024-08-20 PROCEDURE — 97530 THERAPEUTIC ACTIVITIES: CPT

## 2024-08-20 PROCEDURE — 80048 BASIC METABOLIC PNL TOTAL CA: CPT | Performed by: STUDENT IN AN ORGANIZED HEALTH CARE EDUCATION/TRAINING PROGRAM

## 2024-08-20 PROCEDURE — 99232 SBSQ HOSP IP/OBS MODERATE 35: CPT

## 2024-08-20 PROCEDURE — 97110 THERAPEUTIC EXERCISES: CPT

## 2024-08-20 PROCEDURE — 82607 VITAMIN B-12: CPT

## 2024-08-20 PROCEDURE — 99024 POSTOP FOLLOW-UP VISIT: CPT | Performed by: PHYSICIAN ASSISTANT

## 2024-08-20 PROCEDURE — 97116 GAIT TRAINING THERAPY: CPT

## 2024-08-20 PROCEDURE — 85027 COMPLETE CBC AUTOMATED: CPT | Performed by: STUDENT IN AN ORGANIZED HEALTH CARE EDUCATION/TRAINING PROGRAM

## 2024-08-20 PROCEDURE — 82948 REAGENT STRIP/BLOOD GLUCOSE: CPT

## 2024-08-20 RX ORDER — SODIUM CHLORIDE 9 MG/ML
100 INJECTION, SOLUTION INTRAVENOUS CONTINUOUS
Status: DISPENSED | OUTPATIENT
Start: 2024-08-20 | End: 2024-08-20

## 2024-08-20 RX ORDER — OXYCODONE HYDROCHLORIDE 5 MG/1
5 TABLET ORAL EVERY 4 HOURS PRN
Status: DISCONTINUED | OUTPATIENT
Start: 2024-08-20 | End: 2024-08-21

## 2024-08-20 RX ADMIN — SODIUM CHLORIDE 100 ML/HR: 0.9 INJECTION, SOLUTION INTRAVENOUS at 11:39

## 2024-08-20 RX ADMIN — DIPHENHYDRAMINE HYDROCHLORIDE 25 MG: 25 TABLET ORAL at 21:38

## 2024-08-20 RX ADMIN — OXYCODONE HYDROCHLORIDE 5 MG: 5 TABLET ORAL at 11:41

## 2024-08-20 RX ADMIN — PRAVASTATIN SODIUM 20 MG: 20 TABLET ORAL at 16:15

## 2024-08-20 RX ADMIN — INSULIN LISPRO 1 UNITS: 100 INJECTION, SOLUTION INTRAVENOUS; SUBCUTANEOUS at 11:39

## 2024-08-20 RX ADMIN — DOCUSATE SODIUM 100 MG: 100 CAPSULE, LIQUID FILLED ORAL at 17:41

## 2024-08-20 RX ADMIN — FAMOTIDINE 20 MG: 20 TABLET, FILM COATED ORAL at 09:12

## 2024-08-20 RX ADMIN — THIAMINE HCL TAB 100 MG 100 MG: 100 TAB at 09:12

## 2024-08-20 RX ADMIN — OXYCODONE HYDROCHLORIDE 10 MG: 10 TABLET ORAL at 07:34

## 2024-08-20 RX ADMIN — ENOXAPARIN SODIUM 40 MG: 40 INJECTION SUBCUTANEOUS at 09:12

## 2024-08-20 RX ADMIN — DOCUSATE SODIUM 100 MG: 100 CAPSULE, LIQUID FILLED ORAL at 09:12

## 2024-08-20 RX ADMIN — ACETAMINOPHEN 975 MG: 325 TABLET ORAL at 14:10

## 2024-08-20 RX ADMIN — DIPHENHYDRAMINE HYDROCHLORIDE 25 MG: 25 TABLET ORAL at 14:14

## 2024-08-20 RX ADMIN — FAMOTIDINE 20 MG: 20 TABLET, FILM COATED ORAL at 17:41

## 2024-08-20 RX ADMIN — DIPHENHYDRAMINE HYDROCHLORIDE 25 MG: 25 TABLET ORAL at 03:50

## 2024-08-20 RX ADMIN — CYANOCOBALAMIN TAB 500 MCG 500 MCG: 500 TAB at 16:15

## 2024-08-20 RX ADMIN — OXYCODONE HYDROCHLORIDE 5 MG: 5 TABLET ORAL at 21:37

## 2024-08-20 RX ADMIN — Medication 7.5 MG: at 16:14

## 2024-08-20 RX ADMIN — ACETAMINOPHEN 975 MG: 325 TABLET ORAL at 21:38

## 2024-08-20 RX ADMIN — FOLIC ACID 1 MG: 1 TABLET ORAL at 09:12

## 2024-08-20 RX ADMIN — Medication 1 TABLET: at 09:12

## 2024-08-20 RX ADMIN — INSULIN LISPRO 2 UNITS: 100 INJECTION, SOLUTION INTRAVENOUS; SUBCUTANEOUS at 21:38

## 2024-08-20 NOTE — PLAN OF CARE
Problem: PHYSICAL THERAPY ADULT  Goal: Performs mobility at highest level of function for planned discharge setting.  See evaluation for individualized goals.  Description: Treatment/Interventions: Functional transfer training, LE strengthening/ROM, Elevations, Therapeutic exercise, Endurance training, Patient/family training, Bed mobility, Gait training, Spoke to nursing, OT  Equipment Recommended: Walker       See flowsheet documentation for full assessment, interventions and recommendations.  Outcome: Progressing  Note: Prognosis: Good  Problem List: Decreased strength, Decreased range of motion, Decreased endurance, Decreased mobility, Pain, Orthopedic restrictions, Obesity, Impaired judgement  Assessment: Pt. progressing well in her mobility and needed decreased A for all mobility. However pt. is limitied in her mobility due to + orthostatic hypotension. BP readings as follows in supine 136/63, seated 113/58 where patient had no symptoms and post ambulation in standing 74/47 and pt. with symptoms of dizziness. pt. seated on recliner with ice pack on L hip and LEs elevated. RN and PA was informed of orthostatic readings. Pt. reported she is doing well today and pain in well controlled. Pt. needed no hands on A for trasnfers however CGA provided for safety due to orthosatic hypotnesion history. Pt. able to perform pericare in sitting with DS. Pt. is progressing well and possible change to level 3 pending continued progress in mobility with stair negotiation and medical clearance for orthostatic hypotension. Will continue to follow epr PT POC.  Barriers to Discharge: None  Barriers to Discharge Comments: (+) stairs; (+) times home alone  Rehab Resource Intensity Level, PT: III (Minimum Resource Intensity) (pending progress with stair negotiation, inc. amb. distance and medical clearance for orthostatic hypotension)    See flowsheet documentation for full assessment.

## 2024-08-20 NOTE — PROGRESS NOTES
"Willa Blair  48 y.o.  female  MR#: [id number]  8/20/2024    Post-op days: 3  Extremity: left hip    Subjective: 48-year-old female postop day 3 status post left total hip arthroplasty performed by lateral approach for femoral neck fracture performed by Dr. Godfrey.  Patient was seen and examined at bedside.  Patient reports that she is feeling well overall this morning.  Patient denies any fevers chills or sweats or feeling sick.  She does note having pain about the left hip with ambulation during physical therapy.  She also is noting a burning sensation about the posterior aspect of the left hip.  Patient denies the burning sensation radiating down the leg.  No numbness or tingling of the left lower extremity.  Patient denies bowel movement but states that she is passing gas.    Vitals:   Vitals:    08/20/24 0743   BP: 138/64   Pulse: 69   Resp: 20   Temp: 98.4 °F (36.9 °C)   SpO2: 95%       Exam:   A&O x 3 NAD  Left hip:  Mepilex dressing clean, dry and intact  Thigh and calf compartments are soft and compressible  Actively moving ankle and toes  Sensation intact to light touch  DP 2+      Labs:   WBC   Recent Labs     08/18/24  0448 08/19/24  1002 08/20/24  0516   WBC 14.89* 14.01* 11.23*     H/H   Recent Labs     08/18/24  0448 08/19/24  1002 08/20/24  0516   HGB 13.0 12.3 12.0   /  Recent Labs     08/18/24 0448 08/19/24  1002 08/20/24  0516   HCT 40.0 38.3 37.4     Sed Rate No results for input(s): \"SEDRATE\" in the last 72 hours.  CRP No results for input(s): \"CRP\" in the last 72 hours.    Assessment:   POD3 s/p left JOSSE- posterolateral approach by Dr. Godfrey for femoral neck fracture    Plan:   Weightbearing as tolerated to left lower extremity with assistance  PT/OT: Posterior hip precautions  Pain control as needed  DVT prophylaxis: Lovenox, mechanical.  Patient has allergy to aspirin. Recommend either Lovenox or Eliquis 2.5 mg twice daily x 4 weeks at discharge.  Leukocytosis resolving from 14.01 to " 11.23. Likely reactive due to recent surgery  Hgb stable this morning at 12.0  DC planning.  Ortho stable for discharge once cleared by primary team and PT/OT has made their discharge recommendations.  Follow-up as outpatient with Dr. Godfrey in 10 to 14 days.  Orthopedics signing off, please reach out with any questions or concerns     Tammy Ayon PA-C

## 2024-08-20 NOTE — PHYSICAL THERAPY NOTE
Physical Therapy Treatment Note     08/20/24 1047   PT Last Visit   PT Visit Date 08/20/24   Note Type   Note Type Treatment   Pain Assessment   Pain Assessment Tool 0-10   Pain Score 2   Pain Location/Orientation Orientation: Left;Location: Hip   Precautions   Total Hip Replacement ADduction;Internal rotation;Flexion   Restrictions/Precautions   Weight Bearing Precautions Per Order Yes   LLE Weight Bearing Per Order WBAT   Other Precautions Chair Alarm;Bed Alarm;WBS;Fall Risk;THR;Pain  (+ Orthostatic hypotension)   General   Chart Reviewed Yes   Family/Caregiver Present No   Subjective   Subjective Pt. agreeable to PT   Bed Mobility   Supine to Sit 5  Supervision   Additional items HOB elevated;Bedrails   Transfers   Sit to Stand 5  Supervision   Stand to Sit 5  Supervision   Stand pivot 5  Supervision   Additional items Increased time required   Toilet transfer 5  Supervision   Additional items Assist x 1;Increased time required;Standard toilet   Ambulation/Elevation   Gait pattern Improper Weight shift;Decreased foot clearance;Decreased L stance;Short stride;Decreased toe off;Decreased heel strike   Gait Assistance   (CGA)   Additional items Assist x 1;Verbal cues   Assistive Device Rolling walker   Distance 12ft x 2   Balance   Static Sitting Good   Dynamic Sitting Fair   Static Standing Fair   Dynamic Standing Fair -   Ambulatory Fair -   Endurance Deficit   Endurance Deficit Yes   Endurance Deficit Description + orthostatic hypotension   Activity Tolerance   Activity Tolerance Patient tolerated treatment well;Treatment limited secondary to medical complications (Comment)   Nurse Made Aware Yes   Exercises   THR Sitting;Bilateral;AROM;20 reps   Assessment   Prognosis Good   Problem List Decreased strength;Decreased range of motion;Decreased endurance;Decreased mobility;Pain;Orthopedic restrictions;Obesity;Impaired judgement   Assessment Pt. progressing well in her mobility and needed decreased A for all  mobility. However pt. is limitied in her mobility due to + orthostatic hypotension. BP readings as follows in supine 136/63, seated 113/58 where patient had no symptoms and post ambulation in standing 74/47 and pt. with symptoms of dizziness. pt. seated on recliner with ice pack on L hip and LEs elevated. RN and PA was informed of orthostatic readings. Pt. reported she is doing well today and pain in well controlled. Pt. needed no hands on A for trasnfers however CGA provided for safety due to orthosatic hypotnesion history. Pt. able to perform pericare in sitting with DS. Pt. is progressing well and possible change to level 3 pending continued progress in mobility with stair negotiation and medical clearance for orthostatic hypotension. Will continue to follow epr PT POC.   Barriers to Discharge None   Goals   Patient Goals None reported   STG Expiration Date 08/28/24   PT Treatment Day 2   Plan   Treatment/Interventions Functional transfer training;LE strengthening/ROM;Therapeutic exercise;Patient/family training;Bed mobility;Equipment eval/education;Gait training;Spoke to nursing;Spoke to case management;Spoke to MD   Progress Slow progress, medical status limitations   PT Frequency 4-6x/wk   Discharge Recommendation   Rehab Resource Intensity Level, PT III (Minimum Resource Intensity)  (pending progress with stair negotiation, inc. amb. distance and medical clearance for orthostatic hypotension)   Equipment Recommended Walker   Walker Package Recommended Wheeled walker   AM-PAC Basic Mobility Inpatient   Turning in Flat Bed Without Bedrails 3   Lying on Back to Sitting on Edge of Flat Bed Without Bedrails 3   Moving Bed to Chair 4   Standing Up From Chair Using Arms 4   Walk in Room 4   Climb 3-5 Stairs With Railing 3   Basic Mobility Inpatient Raw Score 21   Basic Mobility Standardized Score 45.55   [facility] Highest Level Of Mobility   -HLM Goal 6: Walk 10 steps or more   -HLM Achieved 6: Walk 10 steps  or more   End of Consult   Patient Position at End of Consult All needs within reach;Bed/Chair alarm activated;Bedside chair           Aliza Vizcaino PTA    An AM-PAC basic mobility standardized score less than 42.9 suggest the patient may benefit from discharge to post-acute rehab services.

## 2024-08-20 NOTE — ASSESSMENT & PLAN NOTE
Patient with history of laparoscopic sleeve gastrectomy [place] 6/26/2023  A1c is improved  Will check B12 levels  Avoid NSAIDs

## 2024-08-20 NOTE — PLAN OF CARE
Problem: METABOLIC, FLUID AND ELECTROLYTES - ADULT  Goal: Fluid balance maintained  Description: INTERVENTIONS:  - Monitor labs   - Monitor I/O and WT  - Instruct patient on fluid and nutrition as appropriate  - Assess for signs & symptoms of volume excess or deficit  Outcome: Progressing  Goal: Glucose maintained within target range  Description: INTERVENTIONS:  - Monitor Blood Glucose as ordered  - Assess for signs and symptoms of hyperglycemia and hypoglycemia  - Administer ordered medications to maintain glucose within target range  - Assess nutritional intake and initiate nutrition service referral as needed  Outcome: Progressing     Problem: MUSCULOSKELETAL - ADULT  Goal: Maintain or return mobility to safest level of function  Description: INTERVENTIONS:  - Assess patient's ability to carry out ADLs; assess patient's baseline for ADL function and identify physical deficits which impact ability to perform ADLs (bathing, care of mouth/teeth, toileting, grooming, dressing, etc.)  - Assess/evaluate cause of self-care deficits   - Assess range of motion  - Assess patient's mobility  - Assess patient's need for assistive devices and provide as appropriate  - Encourage maximum independence but intervene and supervise when necessary  - Involve family in performance of ADLs  - Assess for home care needs following discharge   - Consider OT consult to assist with ADL evaluation and planning for discharge  - Provide patient education as appropriate  Outcome: Progressing  Goal: Maintain proper alignment of affected body part  Description: INTERVENTIONS:  - Support, maintain and protect limb and body alignment  - Provide patient/ family with appropriate education  Outcome: Progressing     Problem: SAFETY ADULT  Goal: Patient will remain free of falls  Description: INTERVENTIONS:  - Educate patient/family on patient safety including physical limitations  - Instruct patient to call for assistance with activity   - Consult  OT/PT to assist with strengthening/mobility   - Keep Call bell within reach  - Keep bed low and locked with side rails adjusted as appropriate  - Keep care items and personal belongings within reach  - Initiate and maintain comfort rounds  - Make Fall Risk Sign visible to staff  - Offer Toileting every 2 Hours, in advance of need  - Initiate/Maintain bed alarm  - Obtain necessary fall risk management equipment: walker  - Apply yellow socks and bracelet for high fall risk patients  - Consider moving patient to room near nurses station  Outcome: Progressing     Problem: PAIN - ADULT  Goal: Verbalizes/displays adequate comfort level or baseline comfort level  Description: Interventions:  - Encourage patient to monitor pain and request assistance  - Assess pain using appropriate pain scale  - Administer analgesics based on type and severity of pain and evaluate response  - Implement non-pharmacological measures as appropriate and evaluate response  - Consider cultural and social influences on pain and pain management  - Notify physician/advanced practitioner if interventions unsuccessful or patient reports new pain  Outcome: Progressing     Problem: DISCHARGE PLANNING  Goal: Discharge to home or other facility with appropriate resources  Description: INTERVENTIONS:  - Identify barriers to discharge w/patient and caregiver  - Arrange for needed discharge resources and transportation as appropriate  - Identify discharge learning needs (meds, wound care, etc.)  - Arrange for interpretive services to assist at discharge as needed  - Refer to Case Management Department for coordinating discharge planning if the patient needs post-hospital services based on physician/advanced practitioner order or complex needs related to functional status, cognitive ability, or social support system  Outcome: Progressing

## 2024-08-20 NOTE — PROGRESS NOTES
Critical access hospital  Progress Note  Name: Willa Blair I  MRN: [id number]  Unit/Bed#: E2 -01 I Date of Admission: 8/17/2024   Date of Service: 8/20/2024 I Hospital Day: 2    Assessment & Plan   * Fracture of left hip (HCC)  Assessment & Plan  After mechanical falls in setting of alcohol intoxication, no head strike   Status post total left hip arthroplasty posterolateral approach by Dr. Godfrey for femoral neck fracture on 8/17/2024  Allergy to aspirin.  Continue Lovenox for DVT prophylaxis x 4 weeks  CM following, planning for discharge to rehab  Continue multimodal pain control  Continue bowel regimen to avoid OIC  Patient was orthostatic yesterday with therapy, repeat orthostatics today.  Please compression stockings, encouraged liberal hydration    H/O bariatric surgery  Assessment & Plan  Patient with history of laparoscopic sleeve gastrectomy [place] 6/26/2023  A1c is improved  Will check B12 levels  Avoid NSAIDs    Orthostatic hypotension  Assessment & Plan  Noted to be orthostatic with therapy yesterday  Repeat orthostatics today  Patient has not been hydrating enough.  Encourage liberal hydration today.  Place compression stockings  Avoid hypotension, her amlodipine is currently being held  Her pain is better controlled, will make adjustments to pain regimen    Alcohol use  Assessment & Plan  This is primary etiology for patient's mechanical fall  Denies daily drinking, history of alcohol abuse/withdrawal  Continue MVI, thiamine folate.  No evidence withdrawal    Primary hypertension  Assessment & Plan  Previously was on amlodipine 5 mg daily, not taking PTA  Patient was orthostatic yesterday and her amlodipine has been stopped  A.m. BP well-controlled.  Monitor    Type 2 diabetes mellitus with obesity  (HCC)  Assessment & Plan  Previous history notes patient being on Tresiba 30 units nightly as well as Januvia and oral semaglutide  Her A1c here at 7.7%, sugars are  well-controlled  Plan to start metformin on discharge  Continue SSI for time being      VTE Pharmacologic Prophylaxis: VTE Score: 8 High Risk (Score >/= 5) - Pharmacological DVT Prophylaxis Ordered: enoxaparin (Lovenox). Sequential Compression Devices Ordered.    Mobility:   Basic Mobility Inpatient Raw Score: 18  JH-HLM Goal: 6: Walk 10 steps or more  JH-HLM Achieved: 3: Sit at edge of bed  JH-HLM Goal achieved. Continue to encourage appropriate mobility.    Patient Centered Rounds: I performed bedside rounds with nursing staff today.   Discussions with Specialists or Other Care Team Provider: Ortho, PT, CM    Total Time Spent on Date of Encounter in care of patient: 30 mins. This time was spent on one or more of the following: performing physical exam; counseling and coordination of care; obtaining or reviewing history; documenting in the medical record; reviewing/ordering tests, medications or procedures; communicating with other healthcare professionals and discussing with patient's family/caregivers.    Current Length of Stay: 2 day(s)  Current Patient Status: Inpatient   Certification Statement: The patient will continue to require additional inpatient hospital stay due to rehab, orthostatics  Discharge Plan: Anticipate discharge in 24-48 hrs to rehab facility.    Code Status: Level 1 - Full Code    Subjective:   Patient seen and examined lying in bed.  She reports she feels well.  She has some soreness in her leg but her pain is significantly improved.  Denies any numbness or tingling of the operative extremity.  Note she has not a bowel movement in 5 days but this is normal for her.  She is passing gas and denies any abdominal pain.  She is refusing wanting to try MiraLAX.  She denies any fevers, chills, chest pain or shortness of breath.  She does note when she was moving around the bed yesterday she got dizzy but denies any current dizziness.  She is agreeable and would like to go to rehab as she will not  have much support at home.    Objective:     Vitals:   Temp (24hrs), Av °F (36.7 °C), Min:97.6 °F (36.4 °C), Max:98.4 °F (36.9 °C)    Temp:  [97.6 °F (36.4 °C)-98.4 °F (36.9 °C)] 98.4 °F (36.9 °C)  HR:  [60-89] 69  Resp:  [18-20] 20  BP: ()/(42-80) 138/64  SpO2:  [93 %-96 %] 95 %  Body mass index is 32.51 kg/m².     Input and Output Summary (last 24 hours):     Intake/Output Summary (Last 24 hours) at 2024 0854  Last data filed at 2024 0733  Gross per 24 hour   Intake 240 ml   Output 600 ml   Net -360 ml       Physical Exam:   Physical Exam  Vitals and nursing note reviewed.   Constitutional:       General: She is not in acute distress.     Appearance: She is well-developed. She is obese. She is not toxic-appearing.   Cardiovascular:      Rate and Rhythm: Normal rate and regular rhythm.      Heart sounds: No murmur heard.  Pulmonary:      Effort: Pulmonary effort is normal. No respiratory distress.      Breath sounds: Normal breath sounds.   Abdominal:      General: Bowel sounds are normal.      Palpations: Abdomen is soft.      Tenderness: There is no abdominal tenderness.   Musculoskeletal:         General: Tenderness (right hip) present.      Right lower leg: No edema.      Left lower leg: No edema.   Skin:     General: Skin is warm and dry.   Neurological:      Mental Status: She is alert and oriented to person, place, and time. Mental status is at baseline.   Psychiatric:         Mood and Affect: Mood normal.         Behavior: Behavior normal.        Additional Data:     Labs:  Results from last 7 days   Lab Units 24  0516 24  0938 24  0358   WBC Thousand/uL 11.23*   < > 7.89   HEMOGLOBIN g/dL 12.0   < > 13.3   HEMATOCRIT % 37.4   < > 41.9   PLATELETS Thousands/uL 206   < > 233   SEGS PCT %  --   --  58   LYMPHO PCT %  --   --  37   MONO PCT %  --   --  3*   EOS PCT %  --   --  1    < > = values in this interval not displayed.     Results from last 7 days   Lab Units  08/20/24  0516 08/18/24  0448 08/17/24  0358   SODIUM mmol/L 137   < > 138   POTASSIUM mmol/L 4.0   < > 4.3   CHLORIDE mmol/L 102   < > 107   CO2 mmol/L 30   < > 22   BUN mg/dL 25   < > 28*   CREATININE mg/dL 1.01   < > 0.99   ANION GAP mmol/L 5   < > 9   CALCIUM mg/dL 8.7   < > 8.8   ALBUMIN g/dL  --   --  3.7   TOTAL BILIRUBIN mg/dL  --   --  0.45   ALK PHOS U/L  --   --  65   ALT U/L  --   --  16   AST U/L  --   --  18   GLUCOSE RANDOM mg/dL 136   < > 202*    < > = values in this interval not displayed.     Results from last 7 days   Lab Units 08/17/24  0358   INR  1.01     Results from last 7 days   Lab Units 08/19/24  2124 08/19/24  1543 08/19/24  1121 08/19/24  0635 08/18/24  2104 08/18/24  1547 08/18/24  1110 08/18/24  0634 08/17/24  2108 08/17/24  1631 08/17/24  1118 08/17/24  0729   POC GLUCOSE mg/dl 145* 241* 164* 155* 214* 131 194* 187* 216* 182* 122 132     Results from last 7 days   Lab Units 08/17/24  0938   HEMOGLOBIN A1C % 7.7*     Lines/Drains:  Invasive Devices       Peripheral Intravenous Line  Duration             Peripheral IV 08/18/24 Right Antecubital 1 day                  Imaging: Reviewed radiology reports from this admission including: procedure reports    Recent Cultures (last 7 days):     Last 24 Hours Medication List:   Current Facility-Administered Medications   Medication Dose Route Frequency Provider Last Rate    acetaminophen  975 mg Oral Q8H ROSLYN Leann Gloria, PA-C      bisacodyl  10 mg Rectal Daily PRN Leann Gloria, PA-C      diphenhydrAMINE  25 mg Oral Q6H PRN Don Adrian PA-C      docusate sodium  100 mg Oral BID Leann Gloria, PA-C      enoxaparin  40 mg Subcutaneous Q24H ROSLYN Leann Gloria, PA-C      famotidine  20 mg Oral BID Leann Gloria, PA-C      folic acid  1 mg Oral Daily Leann Gloria, PA-C      hydrALAZINE  10 mg Intravenous Q6H PRN Leann Castro PA-C      insulin lispro  1-5 Units Subcutaneous 4x Daily (AC & HS) Leann Castro PA-C      multivitamin-minerals  1 tablet Oral Daily Leann  JOSIE Castro      ondansetron  4 mg Oral Q6H PRN Leann Castro PA-C      oxyCODONE  5 mg Oral Q4H PRN Devonte Louise PA-C      Or    oxyCODONE  7.5 mg Oral Q6H PRN Devonte Louise PA-C      pravastatin  20 mg Oral Daily With Dinner Leann Castro PA-C      senna  1 tablet Oral HS Leann Castro PA-C      thiamine  100 mg Oral Daily Leann Castro PA-C          Today, Patient Was Seen By: Devonte Louise PA-C    **Please Note: This note may have been constructed using a voice recognition system.**

## 2024-08-20 NOTE — CASE MANAGEMENT
Case Management Discharge Planning Note    Patient name Willa Blair  Location [facility] /E2 -* MRN [id number]  : 1975 Date 2024       Current Admission Date: 2024  Current Admission Diagnosis:Fracture of left hip (HCC)   Patient Active Problem List    Diagnosis Date Noted Date Diagnosed    Orthostatic hypotension 2024     H/O bariatric surgery 2024     Fracture of left hip (HCC) 2024     Alcohol use 2024     Morbidly obese (HCC) 2022     Type 2 diabetes mellitus with obesity  (HCC) 2021     Primary hypertension 2021     Elevated troponin 2021       LOS (days): 2  Geometric Mean LOS (GMLOS) (days):   Days to GMLOS:     OBJECTIVE:  Risk of Unplanned Readmission Score: 9.47         Current admission status: Inpatient   Preferred Pharmacy:   PATIENT/FAMILY REPORTS NO PREFERRED PHARMACY  No address on file      [id number] - [place] - [address] [place] [address]  Phone: [phone] Fax: [phone]    [id number] - [place]  [address] [address]  Phone: [phone] Fax: [phone]    [id number]  [facility] [address] [facility]  [address] [facility] [address]  Phone: [phone] Fax: [phone]    Primary Care Provider: No primary care provider on file.    Primary Insurance: [employer]  Secondary Insurance:     DISCHARGE DETAILS:    Discharge planning discussed with:: Patient  Freedom of Choice: Yes  Comments - Freedom of Choice: Reviewed accepting facility, [facility], pt stated that was too far. CM explained the only option would be to extend the search further to which pt declined. Agreeable to home health. Referrals placed  CM contacted family/caregiver?: No- see comments (declined)  Were Treatment Team discharge recommendations reviewed with patient/caregiver?:  Yes  Did patient/caregiver verbalize understanding of patient care needs?: Yes            Requested Home Health Care         Is the patient interested in HHC at discharge?: Yes  Home Health Discipline requested:: Occupational Therapy, Physical Therapy  Home Health Agency Name:: Other  Home Health Follow-Up Provider:: PCP  Homebound Criteria Met:: Uses an Assist Device (i.e. cane, walker, etc)  Supporting Clincal Findings:: Fatigues Easliy in Short Distances, Limited Endurance    DME Referral Provided  Referral made for DME?: Yes  DME referral completed for the following items:: Walker  DME Supplier Name:: [employer]    Other Referral/Resources/Interventions Provided:  Interventions: DME, HHC         Treatment Team Recommendation: Home with Home Health Care  Discharge Destination Plan:: Home with Home Health Care

## 2024-08-20 NOTE — RESTORATIVE TECHNICIAN NOTE
Restorative Technician Note      Patient Name: Willa Blair     Restorative Tech Visit Date: 08/20/24  Note Type: Mobility  Patient Position Upon Consult: Supine  Activity Performed: Ambulated  Assistive Device: Roller walker  Patient Position at End of Consult: Bedside chair; All needs within reach; Bed/Chair alarm activated            NS

## 2024-08-20 NOTE — ASSESSMENT & PLAN NOTE
Previously was on amlodipine 5 mg daily, not taking PTA  Patient was orthostatic yesterday and her amlodipine has been stopped  A.m. BP well-controlled.  Monitor

## 2024-08-20 NOTE — ASSESSMENT & PLAN NOTE
After mechanical falls in setting of alcohol intoxication, no head strike   Status post total left hip arthroplasty posterolateral approach by Dr. Godfrey for femoral neck fracture on 8/17/2024  Allergy to aspirin.  Continue Lovenox for DVT prophylaxis x 4 weeks  CM following, planning for discharge to rehab  Continue multimodal pain control  Continue bowel regimen to avoid OIC  Patient was orthostatic yesterday with therapy, repeat orthostatics today.  Please compression stockings, encouraged liberal hydration

## 2024-08-20 NOTE — ASSESSMENT & PLAN NOTE
This is primary etiology for patient's mechanical fall  Denies daily drinking, history of alcohol abuse/withdrawal  Continue MVI, thiamine folate.  No evidence withdrawal

## 2024-08-20 NOTE — ASSESSMENT & PLAN NOTE
Noted to be orthostatic with therapy yesterday  Repeat orthostatics today  Patient has not been hydrating enough.  Encourage liberal hydration today.  Place compression stockings  Avoid hypotension, her amlodipine is currently being held  Her pain is better controlled, will make adjustments to pain regimen

## 2024-08-20 NOTE — ASSESSMENT & PLAN NOTE
Patient with history of laparoscopic sleeve gastrectomy [place] 6/26/2023  A1c is improved  Resume oral B12 supplementation, levels at 271  Avoid NSAIDs

## 2024-08-20 NOTE — ASSESSMENT & PLAN NOTE
Previous history notes patient being on Tresiba 30 units nightly as well as Januvia and oral semaglutide  Her A1c here at 7.7%, sugars are well-controlled  Plan to start metformin on discharge  Continue SSI for time being

## 2024-08-21 LAB
ANION GAP SERPL CALCULATED.3IONS-SCNC: 6 MMOL/L (ref 4–13)
BUN SERPL-MCNC: 23 MG/DL (ref 5–25)
CALCIUM SERPL-MCNC: 8.3 MG/DL (ref 8.4–10.2)
CHLORIDE SERPL-SCNC: 104 MMOL/L (ref 96–108)
CO2 SERPL-SCNC: 26 MMOL/L (ref 21–32)
CREAT SERPL-MCNC: 0.81 MG/DL (ref 0.6–1.3)
DME PARACHUTE DELIVERY DATE REQUESTED: NORMAL
DME PARACHUTE DELIVERY DATE REQUESTED: NORMAL
DME PARACHUTE ITEM DESCRIPTION: NORMAL
DME PARACHUTE ITEM DESCRIPTION: NORMAL
DME PARACHUTE ORDER STATUS: NORMAL
DME PARACHUTE ORDER STATUS: NORMAL
DME PARACHUTE SUPPLIER NAME: NORMAL
DME PARACHUTE SUPPLIER NAME: NORMAL
DME PARACHUTE SUPPLIER PHONE: NORMAL
DME PARACHUTE SUPPLIER PHONE: NORMAL
ERYTHROCYTE [DISTWIDTH] IN BLOOD BY AUTOMATED COUNT: 12.7 % (ref 11.6–15.1)
GFR SERPL CREATININE-BSD FRML MDRD: 85 ML/MIN/1.73SQ M
GLUCOSE SERPL-MCNC: 110 MG/DL (ref 65–140)
GLUCOSE SERPL-MCNC: 115 MG/DL (ref 65–140)
GLUCOSE SERPL-MCNC: 175 MG/DL (ref 65–140)
GLUCOSE SERPL-MCNC: 219 MG/DL (ref 65–140)
GLUCOSE SERPL-MCNC: 239 MG/DL (ref 65–140)
HCT VFR BLD AUTO: 35.7 % (ref 34.8–46.1)
HGB BLD-MCNC: 11.2 G/DL (ref 11.5–15.4)
MCH RBC QN AUTO: 29.9 PG (ref 26.8–34.3)
MCHC RBC AUTO-ENTMCNC: 31.4 G/DL (ref 31.4–37.4)
MCV RBC AUTO: 95 FL (ref 82–98)
PLATELET # BLD AUTO: 225 THOUSANDS/UL (ref 149–390)
PMV BLD AUTO: 11.9 FL (ref 8.9–12.7)
POTASSIUM SERPL-SCNC: 4.2 MMOL/L (ref 3.5–5.3)
RBC # BLD AUTO: 3.75 MILLION/UL (ref 3.81–5.12)
SODIUM SERPL-SCNC: 136 MMOL/L (ref 135–147)
WBC # BLD AUTO: 10.23 THOUSAND/UL (ref 4.31–10.16)

## 2024-08-21 PROCEDURE — 97530 THERAPEUTIC ACTIVITIES: CPT

## 2024-08-21 PROCEDURE — 82948 REAGENT STRIP/BLOOD GLUCOSE: CPT

## 2024-08-21 PROCEDURE — 97535 SELF CARE MNGMENT TRAINING: CPT

## 2024-08-21 PROCEDURE — 97110 THERAPEUTIC EXERCISES: CPT

## 2024-08-21 PROCEDURE — 85027 COMPLETE CBC AUTOMATED: CPT

## 2024-08-21 PROCEDURE — 97116 GAIT TRAINING THERAPY: CPT

## 2024-08-21 PROCEDURE — 80048 BASIC METABOLIC PNL TOTAL CA: CPT

## 2024-08-21 PROCEDURE — 99232 SBSQ HOSP IP/OBS MODERATE 35: CPT

## 2024-08-21 RX ORDER — SODIUM CHLORIDE 9 MG/ML
100 INJECTION, SOLUTION INTRAVENOUS CONTINUOUS
Status: DISCONTINUED | OUTPATIENT
Start: 2024-08-21 | End: 2024-08-22

## 2024-08-21 RX ORDER — LIDOCAINE 50 MG/G
1 PATCH TOPICAL DAILY PRN
Status: DISCONTINUED | OUTPATIENT
Start: 2024-08-21 | End: 2024-08-22 | Stop reason: HOSPADM

## 2024-08-21 RX ORDER — METHOCARBAMOL 500 MG/1
250 TABLET, FILM COATED ORAL EVERY 6 HOURS PRN
Status: DISCONTINUED | OUTPATIENT
Start: 2024-08-21 | End: 2024-08-22 | Stop reason: HOSPADM

## 2024-08-21 RX ORDER — OXYCODONE HYDROCHLORIDE 5 MG/1
5 TABLET ORAL EVERY 6 HOURS PRN
Status: DISCONTINUED | OUTPATIENT
Start: 2024-08-21 | End: 2024-08-21

## 2024-08-21 RX ADMIN — SODIUM CHLORIDE 100 ML/HR: 0.9 INJECTION, SOLUTION INTRAVENOUS at 09:41

## 2024-08-21 RX ADMIN — DOCUSATE SODIUM 100 MG: 100 CAPSULE, LIQUID FILLED ORAL at 17:07

## 2024-08-21 RX ADMIN — METHOCARBAMOL 250 MG: 500 TABLET ORAL at 20:10

## 2024-08-21 RX ADMIN — INSULIN LISPRO 1 UNITS: 100 INJECTION, SOLUTION INTRAVENOUS; SUBCUTANEOUS at 22:48

## 2024-08-21 RX ADMIN — ACETAMINOPHEN 975 MG: 325 TABLET ORAL at 14:53

## 2024-08-21 RX ADMIN — INSULIN LISPRO 2 UNITS: 100 INJECTION, SOLUTION INTRAVENOUS; SUBCUTANEOUS at 16:48

## 2024-08-21 RX ADMIN — ACETAMINOPHEN 975 MG: 325 TABLET ORAL at 22:44

## 2024-08-21 RX ADMIN — FOLIC ACID 1 MG: 1 TABLET ORAL at 08:37

## 2024-08-21 RX ADMIN — LIDOCAINE 1 PATCH: 700 PATCH TOPICAL at 20:10

## 2024-08-21 RX ADMIN — DIPHENHYDRAMINE HYDROCHLORIDE 25 MG: 25 TABLET ORAL at 21:44

## 2024-08-21 RX ADMIN — SENNOSIDES 8.6 MG: 8.6 TABLET, FILM COATED ORAL at 22:48

## 2024-08-21 RX ADMIN — CYANOCOBALAMIN TAB 500 MCG 500 MCG: 500 TAB at 08:37

## 2024-08-21 RX ADMIN — Medication 1 TABLET: at 08:37

## 2024-08-21 RX ADMIN — Medication 7.5 MG: at 05:48

## 2024-08-21 RX ADMIN — FAMOTIDINE 20 MG: 20 TABLET, FILM COATED ORAL at 08:37

## 2024-08-21 RX ADMIN — ACETAMINOPHEN 975 MG: 325 TABLET ORAL at 06:00

## 2024-08-21 RX ADMIN — ENOXAPARIN SODIUM 40 MG: 40 INJECTION SUBCUTANEOUS at 08:37

## 2024-08-21 RX ADMIN — DOCUSATE SODIUM 100 MG: 100 CAPSULE, LIQUID FILLED ORAL at 08:37

## 2024-08-21 RX ADMIN — THIAMINE HCL TAB 100 MG 100 MG: 100 TAB at 08:37

## 2024-08-21 RX ADMIN — Medication 2.5 MG: at 22:45

## 2024-08-21 RX ADMIN — Medication 2.5 MG: at 13:38

## 2024-08-21 RX ADMIN — PRAVASTATIN SODIUM 20 MG: 20 TABLET ORAL at 16:06

## 2024-08-21 RX ADMIN — INSULIN LISPRO 1 UNITS: 100 INJECTION, SOLUTION INTRAVENOUS; SUBCUTANEOUS at 11:30

## 2024-08-21 RX ADMIN — SODIUM CHLORIDE 1000 ML: 0.9 INJECTION, SOLUTION INTRAVENOUS at 08:34

## 2024-08-21 RX ADMIN — DIPHENHYDRAMINE HYDROCHLORIDE 25 MG: 25 TABLET ORAL at 13:40

## 2024-08-21 RX ADMIN — FAMOTIDINE 20 MG: 20 TABLET, FILM COATED ORAL at 17:07

## 2024-08-21 NOTE — PLAN OF CARE
Problem: OCCUPATIONAL THERAPY ADULT  Goal: Performs self-care activities at highest level of function for planned discharge setting.  See evaluation for individualized goals.  Description: Treatment Interventions: ADL retraining, Functional transfer training, Endurance training, Patient/family training, Equipment evaluation/education, Compensatory technique education, Continued evaluation, Activityengagement          See flowsheet documentation for full assessment, interventions and recommendations.   Outcome: Progressing  Note: Limitation: Decreased ADL status, Decreased endurance, Decreased self-care trans, Decreased high-level ADLs  Prognosis: Good  Assessment: Pt seen for OT treatment session focusing on functional activity tolerance, education on LH AE , ADLs w/ LH AE, and Safe transfer techniques. Pt alert and cooperative throughout session. Pt seated OOB in chair at start of session. Pt able to recall 2/3 posterior hip precautions at start of session, requiring additional education on remaining 1/3 precautions. Educated pt on available LH AE (ie: sock aid, dressing stick, reacher, LH shoehorn, LH sponge) through verbal instructions and demonstration to increase compliance to posterior hip precautions during ADLs. After education, pt able to complete LB dressing w/ Supervision w/ use of LH AE and good carryover demonstrated. Pt w/ Fair- dynamic standing balance when standing to pull underwear over hips. Pt requested to purchase LH AE for home environment. Written consent obtained. Hip Kit delivered to pt's room at end of session. BP seated in chair: 138/76, HR: 57 bpm. Transfers (sit<>stand) completed w/ Supervision w/ verbal cues for safe technique and placement of hands and L LE. BP upon standin/74, HR: 59 bpm. Pt denied dizziness/lightheadedness. Supervision required for functional mobility with Fair- dynamic standing balance demonstrated w/ RW. BP seated in chair post-mobility: 116/60, HR: 59 bpm.  BP seated in chair post-stair trial: 73/41, HR: 61 bpm. Pt continued to deny dizziness/lightheadedness. Pt returned to recliner and B/L LEs elevated. Repeat BP: 140/73, HR: 56 bpm. Pt seated OOB in chair at end of session w/ PTA present. All needs met and pt reports no further questions for OT at this time. The patient's raw score on the -PAC Daily Activity Inpatient Short Form is 21. A raw score of greater than or equal to 19 suggests the patient may benefit from discharge to home. Please refer to the recommendation of the Occupational Therapist for safe discharge planning. OT to follow pt on caseload.     Rehab Resource Intensity Level, OT: III (Minimum Resource Intensity)

## 2024-08-21 NOTE — CASE MANAGEMENT
Case Management Discharge Planning Note    Patient name Willa Blair  Location [facility] 2 /E2 -* MRN [id number]  : 1975 Date 2024       Current Admission Date: 2024  Current Admission Diagnosis:Fracture of left hip (HCC)   Patient Active Problem List    Diagnosis Date Noted Date Diagnosed    Orthostatic hypotension 2024     H/O bariatric surgery 2024     Fracture of left hip (HCC) 2024     Alcohol use 2024     Morbidly obese (HCC) 2022     Type 2 diabetes mellitus with obesity  (HCC) 2021     Primary hypertension 2021     Elevated troponin 2021       LOS (days): 3  Geometric Mean LOS (GMLOS) (days): 4.1  Days to GMLOS:1.1     OBJECTIVE:  Risk of Unplanned Readmission Score: 12.67         Current admission status: Inpatient   Preferred Pharmacy:   PATIENT/FAMILY REPORTS NO PREFERRED PHARMACY  No address on file      [id number] - [place] - [address] [place] [address]  Phone: [phone] Fax: [phone]    [id number] - [place] - [address] [place] [address]  Phone: [phone] Fax: [phone]    [id number]  [place]  [address] [address] [facility] [address]  Phone: [phone] Fax: [phone]    Primary Care Provider: No primary care provider on file.    Primary Insurance: [employer]  Secondary Insurance:     DISCHARGE DETAILS:    Discharge planning discussed with:: Patient  Freedom of Choice: Yes  Comments - Freedom of Choice: [facility] accepting for HHC. Pt agreeable  CM contacted family/caregiver?: Yes (family at bedside)  Were Treatment Team discharge recommendations reviewed with patient/caregiver?: Yes  Did patient/caregiver verbalize understanding of patient care needs?: Yes            Requested Home Health Care         Is the patient interested in HHC at  discharge?: Yes  Home Health Discipline requested:: Occupational Therapy, Physical Therapy  Home Health Agency Name:: [facility]  Home Health Follow-Up Provider:: Other (comment) (Dr. Godfrey)  Home Health Services Needed:: Evaluate Functional Status and Safety, Gait/ADL Training, Strengthening/Theraputic Exercises to Improve Function  Homebound Criteria Met:: Uses an Assist Device (i.e. cane, walker, etc)  Supporting Clincal Findings:: Limited Endurance, Fatigues Easliy in Short Distances    DME Referral Provided  Referral made for DME?: Yes  DME referral completed for the following items:: Walker, Shower Chair  DME Supplier Name:: [employer]    Other Referral/Resources/Interventions Provided:  Interventions: HHC, DME         Treatment Team Recommendation: Home with Home Health Care  Discharge Destination Plan:: Home with Home Health Care  Transport at Discharge : Family                       Accompanied by: Family member

## 2024-08-21 NOTE — PLAN OF CARE
Problem: PHYSICAL THERAPY ADULT  Goal: Performs mobility at highest level of function for planned discharge setting.  See evaluation for individualized goals.  Description: Treatment/Interventions: Functional transfer training, LE strengthening/ROM, Elevations, Therapeutic exercise, Endurance training, Patient/family training, Bed mobility, Gait training, Spoke to nursing, OT  Equipment Recommended: Walker       See flowsheet documentation for full assessment, interventions and recommendations.  Outcome: Progressing  Note: Prognosis: Good  Problem List: Decreased mobility, Decreased endurance, Pain, Orthopedic restrictions  Assessment: Pt. progressing well with voerall mobility. Pt. did not need physical assistance for mobility. However she reported fatigue with exertion. Pt. however continues with orthostatic hypotension without symptoms this session. BP readings as follow seated 138/76, standing 109/74, post amb. in sitting 116/60, post negotiating stairs in sitting 73/41, after sitting for a 5 minutes  140/73, post ambulation of 140ft x 2 - 125/73 mmHg. Reported the readings to RN. Pt. seated on chair with all needs within reach and chair alarm engaged at the end of the session. Will continue to follow per PT POC.  Barriers to Discharge: None  Barriers to Discharge Comments: (+) stairs; (+) times home alone  Rehab Resource Intensity Level, PT: III (Minimum Resource Intensity) (pending medical clearance, HHPT)    See flowsheet documentation for full assessment.

## 2024-08-21 NOTE — ASSESSMENT & PLAN NOTE
After mechanical falls in setting of alcohol intoxication, no head strike   Status post total left hip arthroplasty posterolateral approach by Dr. Godfrey for femoral neck fracture on 8/17/2024  Allergy to aspirin.  Continue Lovenox for DVT prophylaxis x 4 weeks  CM following, patient planning to return home with HHR  Readjusting pain control  Continue bowel regimen to avoid OIC - BM yesterday, 8/20  Monitoring orthostasis  OP ortho follow up 10-14 days

## 2024-08-21 NOTE — ASSESSMENT & PLAN NOTE
Previously was on amlodipine 5 mg daily, not taking PTA  Patient was orthostatic 8/19 and her Norvasc had been stopped  With hypotension this AM after sitting up in the bed, she is mildly symptomatic  Avoid hypotensive inducing medications, compression stockings, IVF bolus and IVF.  Elevate legs, slow positional changes. Discussed with nursing  Repeat BP

## 2024-08-21 NOTE — PROGRESS NOTES
Patient:    MRN:  [id number]    Viv Request ID:  [id number]    Level of care reserved:  Home Health Agency    Partner Reserved:  [facility], [place] [address] [phone]    Clinical needs requested:    Geography searched:  [phone]    Start of Service:    Request sent:  4:08pm EDT on 8/20/2024 by Meg Colmenares    Partner reserved:  2:29pm EDT on 8/21/2024 by Meg Colmenares    Choice list shared:  2:12pm EDT on 8/21/2024 by Meg Colmenares

## 2024-08-21 NOTE — ASSESSMENT & PLAN NOTE
Previous history notes patient being on Tresiba 30 units nightly as well as Januvia and oral semaglutide  Her A1c here at 7.7%, sugars are well-controlled  Plan for metformin on discharge  Continue SSI for time being

## 2024-08-21 NOTE — PROGRESS NOTES
[facility]  Progress Note  Name: Willa Blair I  MRN: [id number]  Unit/Bed#: E2 -01 I Date of Admission: 8/17/2024   Date of Service: 8/21/2024 I Hospital Day: 3    Assessment & Plan   * Fracture of left hip (HCC)  Assessment & Plan  After mechanical falls in setting of alcohol intoxication, no head strike   Status post total left hip arthroplasty posterolateral approach by Dr. Godfrey for femoral neck fracture on 8/17/2024  Allergy to aspirin.  Continue Lovenox for DVT prophylaxis x 4 weeks  CM following, patient planning to return home with HHR  Readjusting pain control  Continue bowel regimen to avoid OIC - BM yesterday, 8/20  Monitoring orthostasis  OP ortho follow up 10-14 days    H/O bariatric surgery  Assessment & Plan  Patient with history of laparoscopic sleeve gastrectomy [place] 6/26/2023  A1c is improved  Resume oral B12 supplementation, levels at 271  Avoid NSAIDs    Orthostatic hypotension  Assessment & Plan  Patient has been noted to be orthostatic with positional changes  Patient has not been hydrating enough.  Encourage liberal hydration  Place compression stockings, IVF  Avoid hypotension, amlodipine has been discontinued  Her pain is better controlled.  Will make ongoing changes to pain regimen to avoid hypotension post operatively.     Alcohol use  Assessment & Plan  This is primary etiology for patient's mechanical fall  No evidence of withdrawal    Primary hypertension  Assessment & Plan  Previously was on amlodipine 5 mg daily, not taking PTA  Patient was orthostatic 8/19 and her Norvasc had been stopped  With hypotension this AM after sitting up in the bed, she is mildly symptomatic  Avoid hypotensive inducing medications, compression stockings, IVF bolus and IVF.  Elevate legs, slow positional changes. Discussed with nursing  Repeat BP    Type 2 diabetes mellitus with obesity  (HCC)  Assessment & Plan  Previous history notes patient being on  Tresiba 30 units nightly as well as Januvia and oral semaglutide  Her A1c here at 7.7%, sugars are well-controlled  Plan for metformin on discharge  Continue SSI for time being         VTE Pharmacologic Prophylaxis: VTE Score: 8 High Risk (Score >/= 5) - Pharmacological DVT Prophylaxis Ordered: enoxaparin (Lovenox). Sequential Compression Devices Ordered.    Mobility:   Basic Mobility Inpatient Raw Score: 21  JH-HLM Goal: 6: Walk 10 steps or more  JH-HLM Achieved: 7: Walk 25 feet or more  JH-HLM Goal achieved. Continue to encourage appropriate mobility.    Patient Centered Rounds: I performed bedside rounds with nursing staff today.   Discussions with Specialists or Other Care Team Provider: NursingNICOLLE    Total Time Spent on Date of Encounter in care of patient: 30 mins. This time was spent on one or more of the following: performing physical exam; counseling and coordination of care; obtaining or reviewing history; documenting in the medical record; reviewing/ordering tests, medications or procedures; communicating with other healthcare professionals and discussing with patient's family/caregivers.    Current Length of Stay: 3 day(s)  Current Patient Status: Inpatient   Certification Statement: The patient will continue to require additional inpatient hospital stay due to orthostatic hypotension post operatively  Discharge Plan: Anticipate discharge in 24-48 hrs to home with home services.    Code Status: Level 1 - Full Code    Subjective:   Patient seen examined lying in bed.  Reports she is feeling a dizzy/lightheaded sensation when she sat up in the bed and tried to eat breakfast.  It is getting a little better as she sits still.  She denies any chest pain or shortness of breath, blurred vision.  She had a bowel movement yesterday.  She has some pressure in her hip when she stands but denies any significant pain, numbness or tingling.  Denies any abdominal pain, nausea or vomiting.    Objective:     Vitals:    Temp (24hrs), Av.7 °F (36.5 °C), Min:97.5 °F (36.4 °C), Max:98 °F (36.7 °C)    Temp:  [97.5 °F (36.4 °C)-98 °F (36.7 °C)] 97.5 °F (36.4 °C)  HR:  [61-70] 67  Resp:  [18] 18  BP: ()/(46-68) 82/46  SpO2:  [93 %-98 %] 93 %  Body mass index is 32.51 kg/m².     Input and Output Summary (last 24 hours):     Intake/Output Summary (Last 24 hours) at 2024 0823  Last data filed at 2024 2230  Gross per 24 hour   Intake 720 ml   Output 300 ml   Net 420 ml       Physical Exam:   Physical Exam  Vitals and nursing note reviewed.   Constitutional:       General: She is not in acute distress.     Appearance: She is well-developed. She is not toxic-appearing or diaphoretic.   Cardiovascular:      Rate and Rhythm: Normal rate and regular rhythm.   Pulmonary:      Effort: Pulmonary effort is normal. No respiratory distress.      Breath sounds: Normal breath sounds.   Abdominal:      General: Bowel sounds are normal.      Palpations: Abdomen is soft.      Tenderness: There is no abdominal tenderness.   Musculoskeletal:         General: Tenderness (left hip) present.      Right lower leg: No edema.      Left lower leg: No edema.      Comments: Left hip dressing CDI.   Skin:     General: Skin is warm and dry.   Neurological:      General: No focal deficit present.      Mental Status: She is alert and oriented to person, place, and time. Mental status is at baseline.   Psychiatric:         Mood and Affect: Mood normal.         Behavior: Behavior normal.        Additional Data:     Labs:  Results from last 7 days   Lab Units 24  0453 24  0938 24  0358   WBC Thousand/uL 10.23*   < > 7.89   HEMOGLOBIN g/dL 11.2*   < > 13.3   HEMATOCRIT % 35.7   < > 41.9   PLATELETS Thousands/uL 225   < > 233   SEGS PCT %  --   --  58   LYMPHO PCT %  --   --  37   MONO PCT %  --   --  3*   EOS PCT %  --   --  1    < > = values in this interval not displayed.     Results from last 7 days   Lab Units 24  2057  08/18/24  0448 08/17/24  0358   SODIUM mmol/L 136   < > 138   POTASSIUM mmol/L 4.2   < > 4.3   CHLORIDE mmol/L 104   < > 107   CO2 mmol/L 26   < > 22   BUN mg/dL 23   < > 28*   CREATININE mg/dL 0.81   < > 0.99   ANION GAP mmol/L 6   < > 9   CALCIUM mg/dL 8.3*   < > 8.8   ALBUMIN g/dL  --   --  3.7   TOTAL BILIRUBIN mg/dL  --   --  0.45   ALK PHOS U/L  --   --  65   ALT U/L  --   --  16   AST U/L  --   --  18   GLUCOSE RANDOM mg/dL 115   < > 202*    < > = values in this interval not displayed.     Results from last 7 days   Lab Units 08/17/24  0358   INR  1.01     Results from last 7 days   Lab Units 08/21/24  0650 08/20/24  2105 08/20/24  1556 08/20/24  1119 08/19/24  2124 08/19/24  1543 08/19/24  1121 08/19/24  0635 08/18/24  2104 08/18/24  1547 08/18/24  1110 08/18/24  0634   POC GLUCOSE mg/dl 110 225* 123 160* 145* 241* 164* 155* 214* 131 194* 187*     Results from last 7 days   Lab Units 08/17/24  0938   HEMOGLOBIN A1C % 7.7*     Lines/Drains:  Invasive Devices       Peripheral Intravenous Line  Duration             Peripheral IV 08/20/24 Left Antecubital <1 day                  Recent Cultures (last 7 days):     Last 24 Hours Medication List:   Current Facility-Administered Medications   Medication Dose Route Frequency Provider Last Rate    acetaminophen  975 mg Oral Q8H ROSLYN Leann Gloria, JOSIE      bisacodyl  10 mg Rectal Daily PRN Leannpoornima Castro, JOSIE      cyanocobalamin  500 mcg Oral Daily Devonte Louise PA-C      diphenhydrAMINE  25 mg Oral Q6H PRN Don Adrian PA-C      docusate sodium  100 mg Oral BID Leann Gloria, JOSIE      enoxaparin  40 mg Subcutaneous Q24H ROSLYN Leann Gloria, JOSIE      famotidine  20 mg Oral BID Leann Gloria, JOSIE      folic acid  1 mg Oral Daily Leann Gloria, JOSIE      hydrALAZINE  10 mg Intravenous Q6H PRN Leann Castro PA-C      insulin lispro  1-5 Units Subcutaneous 4x Daily (AC & HS) Leann Castro PA-C      lidocaine  1 patch Topical Daily PRN Devonte Louise PA-C      methocarbamol  250 mg Oral Q6H PRN  Devonte Louise PA-C      multivitamin-minerals  1 tablet Oral Daily Leann GloriaJOSIE bob      ondansetron  4 mg Oral Q6H PRN Leann GloriaJOSIE bob      oxyCODONE  2.5 mg Oral Q6H PRN Devonte Louise PA-C      pravastatin  20 mg Oral Daily With Dinner Leann GloriaJOSIE bob      senna  1 tablet Oral HS Leannpoornima Castro PA-C      sodium chloride  1,000 mL Intravenous Once Devonte Louise PA-C      sodium chloride  100 mL/hr Intravenous Continuous Devonte Louise PA-C      thiamine  100 mg Oral Daily Leannpoornima Castro PA-C          Today, Patient Was Seen By: Devonte Louise PA-C    **Please Note: This note may have been constructed using a voice recognition system.**

## 2024-08-21 NOTE — ASSESSMENT & PLAN NOTE
Patient has been noted to be orthostatic with positional changes  Patient has not been hydrating enough.  Encourage liberal hydration  Place compression stockings, IVF  Avoid hypotension, amlodipine has been discontinued  Her pain is better controlled.  Will make ongoing changes to pain regimen to avoid hypotension post operatively.

## 2024-08-21 NOTE — OCCUPATIONAL THERAPY NOTE
Occupational Therapy Progress Note     Patient Name: Willa Blair  Today's Date: 8/21/2024  Problem List  Principal Problem:    Fracture of left hip (HCC)  Active Problems:    Type 2 diabetes mellitus with obesity  (HCC)    Primary hypertension    Alcohol use    Orthostatic hypotension    H/O bariatric surgery          08/21/24 1025   OT Last Visit   OT Visit Date 08/21/24   Note Type   Note Type Treatment   Pain Assessment   Pain Assessment Tool 0-10   Pain Score 3   Pain Location/Orientation Orientation: Left;Location: Hip   Patient's Stated Pain Goal No pain   Hospital Pain Intervention(s) Repositioned;Ambulation/increased activity;Emotional support;Rest   Multiple Pain Sites No   Restrictions/Precautions   Weight Bearing Precautions Per Order Yes   LLE Weight Bearing Per Order WBAT   Other Precautions Chair Alarm;Bed Alarm;THR;Fall Risk;Pain  (posterior hip precautions, orthostatic hypotension)   ADL   Where Assessed Chair   Grooming Assistance 5  Supervision/Setup   Grooming Deficit Setup;Supervision/safety;Increased time to complete   LB Dressing Assistance 5  Supervision/Setup   LB Dressing Deficit Setup;Supervision/safety;Increased time to complete;Don/doff R sock;Don/doff L sock;Thread RLE into underwear;Thread LLE into underwear;Pull up over hips;Use of adaptive equipment   LB Dressing Comments Please refer to assessment for education on LH AE   Functional Standing Tolerance   Time ~5 mins   Activity Dynamic standing balance activity   Comments Fair- dynamic standing balance w/ RW support   Bed Mobility   Supine to Sit Unable to assess   Sit to Supine Unable to assess   Additional Comments Pt seated OOB in chair at end of session w/ PTA present. All needs met and pt reports no further questions for OT at this time.   Transfers   Sit to Stand 5  Supervision   Additional items Armrests;Increased time required;Verbal cues   Stand to Sit 5  Supervision   Additional items Armrests;Increased time  required;Verbal cues   Additional Comments Cues for safe technique and placement of hands and L LE   Functional Mobility   Functional Mobility 5  Supervision   Additional Comments Assist x1 w/ chair follow   Additional items Rolling walker   Cognition   Overall Cognitive Status WFL   Arousal/Participation Alert;Cooperative   Attention Within functional limits   Orientation Level Oriented X4   Memory Within functional limits   Following Commands Follows all commands and directions without difficulty   Activity Tolerance   Activity Tolerance Patient tolerated treatment well;Treatment limited secondary to medical complications (Comment)  (hypotension)   Medical Staff Made Aware Meg RN; NICOLLE Weaver; MIREYA Abrams   Assessment   Assessment Pt seen for OT treatment session focusing on functional activity tolerance, education on LH AE , ADLs w/ LH AE, and Safe transfer techniques. Pt alert and cooperative throughout session. Pt seated OOB in chair at start of session. Pt able to recall 2/3 posterior hip precautions at start of session, requiring additional education on remaining 1/3 precautions. Educated pt on available LH AE (ie: sock aid, dressing stick, reacher, LH shoehorn, LH sponge) through verbal instructions and demonstration to increase compliance to posterior hip precautions during ADLs. After education, pt able to complete LB dressing w/ Supervision w/ use of LH AE and good carryover demonstrated. Pt w/ Fair- dynamic standing balance when standing to pull underwear over hips. Pt requested to purchase LH AE for home environment. Written consent obtained. Hip Kit delivered to pt's room at end of session. BP seated in chair: 138/76, HR: 57 bpm. Transfers (sit<>stand) completed w/ Supervision w/ verbal cues for safe technique and placement of hands and L LE. BP upon standin/74, HR: 59 bpm. Pt denied dizziness/lightheadedness. Supervision required for functional mobility with Fair- dynamic standing balance  demonstrated w/ RW. BP seated in chair post-mobility: 116/60, HR: 59 bpm. BP seated in chair post-stair trial: 73/41, HR: 61 bpm. Pt continued to deny dizziness/lightheadedness. Pt returned to recliner and B/L LEs elevated. Repeat BP: 140/73, HR: 56 bpm. Pt seated OOB in chair at end of session w/ PTA present. All needs met and pt reports no further questions for OT at this time. The patient's raw score on the AM-PAC Daily Activity Inpatient Short Form is 21. A raw score of greater than or equal to 19 suggests the patient may benefit from discharge to home. Please refer to the recommendation of the Occupational Therapist for safe discharge planning. OT to follow pt on caseload.   Plan   Treatment Interventions ADL retraining;Functional transfer training;Endurance training;Patient/family training;Equipment evaluation/education;Compensatory technique education;Continued evaluation;Activityengagement   Goal Expiration Date 09/01/24   OT Treatment Day 1   OT Frequency 3-5x/wk   Discharge Recommendation   Rehab Resource Intensity Level, OT III (Minimum Resource Intensity)   Equipment Recommended Hip Kit ($);Other (comment);Shower transfer bench ($$)  (delivered to pt's room at end of session.)   AM-PAC Daily Activity Inpatient   Lower Body Dressing 3   Bathing 3   Toileting 3   Upper Body Dressing 4   Grooming 4   Eating 4   Daily Activity Raw Score 21   Daily Activity Standardized Score (Calc for Raw Score >=11) 44.27   AM-PAC Applied Cognition Inpatient   Following a Speech/Presentation 4   Understanding Ordinary Conversation 4   Taking Medications 4   Remembering Where Things Are Placed or Put Away 4   Remembering List of 4-5 Errands 4   Taking Care of Complicated Tasks 4   Applied Cognition Raw Score 24   Applied Cognition Standardized Score 62.21       Neetu Buenrostro, OTR/L

## 2024-08-21 NOTE — PHYSICAL THERAPY NOTE
Physical Therapy Treatment Note     08/21/24 1054   PT Last Visit   PT Visit Date 08/21/24   Note Type   Note Type Treatment   Pain Assessment   Pain Assessment Tool 0-10   Pain Score 2   Pain Location/Orientation Orientation: Left;Location: Hip   Precautions   Total Hip Replacement ADduction;Internal rotation;Flexion   Restrictions/Precautions   Weight Bearing Precautions Per Order Yes   LLE Weight Bearing Per Order WBAT   Other Precautions Chair Alarm;WBS;THR;Fall Risk;Pain  (+ orthostatic Hypotension)   General   Chart Reviewed Yes   Subjective   Subjective Pt. agreeable to PT   Transfers   Sit to Stand 5  Supervision   Additional items Armrests;Increased time required   Stand to Sit 5  Supervision   Additional items Armrests;Increased time required   Stand pivot 5  Supervision   Additional items Increased time required;Assist x 1   Ambulation/Elevation   Gait pattern Improper Weight shift;Decreased L stance;Decreased toe off;Step through pattern;Inconsistent dustin;Decreased heel strike   Gait Assistance 5  Supervision   Additional items Assist x 1;Verbal cues;Assist x 2;Other (Comment)  (2nd A for safety due to orthostatic hypotension)   Assistive Device Rolling walker   Distance 90ft, 140ft x 2   Stair Management Assistance   (CGA)   Additional items Assist x 1;Verbal cues;Increased time required   Stair Management Technique Two rails;Step to pattern;Foreward;Nonreciprocal   Number of Stairs 5   Balance   Static Sitting Good   Dynamic Sitting Fair +   Static Standing Fair   Dynamic Standing Fair -   Ambulatory Fair -   Endurance Deficit   Endurance Deficit Yes   Endurance Deficit Description orthostatic hypotension   Activity Tolerance   Activity Tolerance Patient tolerated treatment well   Nurse Made Aware Yes   Exercises   THR Sitting;Bilateral;AROM;20 reps   Assessment   Prognosis Good   Problem List Decreased mobility;Decreased endurance;Pain;Orthopedic restrictions   Assessment Pt. progressing well with  abdirahman mobility. Pt. did not need physical assistance for mobility. However she reported fatigue with exertion. Pt. however continues with orthostatic hypotension without symptoms this session. BP readings as follow seated 138/76, standing 109/74, post amb. in sitting 116/60, post negotiating stairs in sitting 73/41, after sitting for a 5 minutes  140/73, post ambulation of 140ft x 2 - 125/73 mmHg. Reported the readings to RN. Pt. seated on chair with all needs within reach and chair alarm engaged at the end of the session. Will continue to follow per PT POC.   Barriers to Discharge None   Goals   Patient Goals None reported   STG Expiration Date 08/28/24   PT Treatment Day 3   Plan   Treatment/Interventions Functional transfer training;LE strengthening/ROM;Elevations;Therapeutic exercise;Patient/family training;Gait training;Spoke to nursing;OT;Equipment eval/education   Progress Slow progress, medical status limitations   PT Frequency 4-6x/wk   Discharge Recommendation   Rehab Resource Intensity Level, PT III (Minimum Resource Intensity)  (pending medical clearance, HHPT)   Equipment Recommended Walker   Walker Package Recommended Wheeled walker   AM-PAC Basic Mobility Inpatient   Turning in Flat Bed Without Bedrails 4   Lying on Back to Sitting on Edge of Flat Bed Without Bedrails 4   Moving Bed to Chair 4   Standing Up From Chair Using Arms 4   Walk in Room 4   Climb 3-5 Stairs With Railing 4   Basic Mobility Inpatient Raw Score 24   Basic Mobility Standardized Score 57.68   [facility] Highest Level Of Mobility   -HLM Goal 8: Walk 250 feet or more   JH-HLM Achieved 7: Walk 25 feet or more           Aliza Vizcaino PTA    An AM-PAC basic mobility standardized score less than 42.9 suggest the patient may benefit from discharge to post-acute rehab services.

## 2024-08-22 VITALS
RESPIRATION RATE: 16 BRPM | DIASTOLIC BLOOD PRESSURE: 60 MMHG | OXYGEN SATURATION: 96 % | HEART RATE: 59 BPM | SYSTOLIC BLOOD PRESSURE: 142 MMHG | BODY MASS INDEX: 32.51 KG/M2 | TEMPERATURE: 98.1 F | WEIGHT: 166.45 LBS

## 2024-08-22 PROBLEM — I95.1 ORTHOSTATIC HYPOTENSION: Status: RESOLVED | Noted: 2024-08-20 | Resolved: 2024-08-22

## 2024-08-22 LAB — GLUCOSE SERPL-MCNC: 142 MG/DL (ref 65–140)

## 2024-08-22 PROCEDURE — 99239 HOSP IP/OBS DSCHRG MGMT >30: CPT

## 2024-08-22 PROCEDURE — 88305 TISSUE EXAM BY PATHOLOGIST: CPT | Performed by: PATHOLOGY

## 2024-08-22 PROCEDURE — 88311 DECALCIFY TISSUE: CPT | Performed by: PATHOLOGY

## 2024-08-22 PROCEDURE — 82948 REAGENT STRIP/BLOOD GLUCOSE: CPT

## 2024-08-22 RX ORDER — SENNOSIDES 8.6 MG
8.6 TABLET ORAL
Qty: 14 TABLET | Refills: 0 | Status: SHIPPED | OUTPATIENT
Start: 2024-08-22

## 2024-08-22 RX ORDER — DOCUSATE SODIUM 100 MG/1
100 CAPSULE, LIQUID FILLED ORAL 2 TIMES DAILY
Qty: 30 CAPSULE | Refills: 0 | Status: SHIPPED | OUTPATIENT
Start: 2024-08-22

## 2024-08-22 RX ORDER — OXYCODONE HYDROCHLORIDE 5 MG/1
2.5 TABLET ORAL EVERY 8 HOURS PRN
Qty: 21 TABLET | Refills: 0 | Status: SHIPPED | OUTPATIENT
Start: 2024-08-22

## 2024-08-22 RX ORDER — ACETAMINOPHEN 325 MG/1
975 TABLET ORAL EVERY 8 HOURS SCHEDULED
Start: 2024-08-22 | End: 2024-09-01

## 2024-08-22 RX ORDER — METHOCARBAMOL 500 MG/1
250 TABLET, FILM COATED ORAL EVERY 6 HOURS PRN
Qty: 30 TABLET | Refills: 0 | Status: SHIPPED | OUTPATIENT
Start: 2024-08-22

## 2024-08-22 RX ADMIN — FOLIC ACID 1 MG: 1 TABLET ORAL at 09:04

## 2024-08-22 RX ADMIN — THIAMINE HCL TAB 100 MG 100 MG: 100 TAB at 09:04

## 2024-08-22 RX ADMIN — DOCUSATE SODIUM 100 MG: 100 CAPSULE, LIQUID FILLED ORAL at 09:04

## 2024-08-22 RX ADMIN — FAMOTIDINE 20 MG: 20 TABLET, FILM COATED ORAL at 09:04

## 2024-08-22 RX ADMIN — CYANOCOBALAMIN TAB 500 MCG 500 MCG: 500 TAB at 09:04

## 2024-08-22 RX ADMIN — ENOXAPARIN SODIUM 40 MG: 40 INJECTION SUBCUTANEOUS at 09:04

## 2024-08-22 RX ADMIN — Medication 1 TABLET: at 09:04

## 2024-08-22 RX ADMIN — ACETAMINOPHEN 975 MG: 325 TABLET ORAL at 06:24

## 2024-08-22 NOTE — DISCHARGE SUMMARY
[facility]  Discharge- Willa Blair 1975, 48 y.o. female MRN: [id number]  Unit/Bed#: E2 -01 Encounter: [id number]  Primary Care Provider: No primary care provider on file.   Date and time admitted to hospital: 8/17/2024  3:17 AM    * Fracture of left hip (HCC)  Assessment & Plan  After mechanical falls in setting of alcohol intoxication, no head strike   Status post total left hip arthroplasty posterolateral approach by Dr. Godfrey for femoral neck fracture on 8/17/2024  Allergy to aspirin.  Received Lovenox inpatient.  Transitioned over to oral Eliquis 2.5 mg by mouth twice daily for additional 23 days for DVT prophylaxis. (Complete 4 weeks total)  Discharged with home rehab  Continue multimodal pain control on discharge, patient is aware of side effects and frequency to use these medications  Bowel regimen on discharge to avoid OIC.  Patient had BM 8/20  No further orthostasis  Follow-up Dr. Godfrey outpatient 10 to 14 days.  Patient is aware and referrals in the given on discharge  We discussed red flag symptoms to inform a provider which include but are not limited to increased bruising, bleeding, drainage from the site, fevers or worsening pain, lightheadedness or dizziness. Given referrals for outpatient PCP.    H/O bariatric surgery  Assessment & Plan  Patient with history of laparoscopic sleeve gastrectomy [place] 6/26/2023  A1c is improved  Resume oral B12 supplementation, levels at 271. Discharged with supplement  Avoid NSAIDs    Alcohol use  Assessment & Plan  This is primary etiology for patient's mechanical fall  No evidence of withdrawal or abuse    Primary hypertension  Assessment & Plan  Previously was on amlodipine 5 mg daily, not taking PTA  Patient had severe orthostasis after surgery, monitor off postoperatively  Outpatient follow-up PCP    Type 2 diabetes mellitus with obesity  (HCC)  Assessment & Plan  Previous history notes patient being on Tresiba  30 units nightly as well as Januvia and oral semaglutide  Her A1c here at 7.7%, sugars are well-controlled  Plan for metformin on discharge, discussed with her side effects and dosing.    Orthostatic hypotension-resolved as of 8/22/2024  Assessment & Plan  Patient has been noted to be orthostatic with positional changes  Improved status post IV fluids, compression stockings and pain regimen changes  No further evidence today      Medical Problems       Resolved Problems  Date Reviewed: 8/22/2024            Resolved    Orthostatic hypotension 8/22/2024     Resolved by  Devonte Louise PA-C        Discharging Physician / Practitioner: Devonte Louise PA-C  PCP: No primary care provider on file.  Admission Date:   Admission Orders (From admission, onward)       Ordered        08/18/24 1407  INPATIENT ADMISSION  Once            08/17/24 0437  Place in Observation  Once                          Discharge Date: 08/22/24    Consultations During Hospital Stay:  Orthopedics   Physical therapy, Occupational Therapy    Procedures Performed:   Left total hip arthroplasty posterior lateral approach by Dr. Godfrey for femoral neck fracture on 8/17/2024    Significant Findings / Test Results:   XR pelvis ap only 1 or 2 vw  Result Date: 8/19/2024  Impression: Unremarkable total left hip arthroplasty.     XR hip/pelv 2-3 vws left  Result Date: 8/18/2024  Impression: Acute left femoral neck fracture.     XR chest 1 view portable  Result Date: 8/17/2024  Impression: No acute cardiopulmonary disease.     Test Results Pending at Discharge (will require follow up):   None     Outpatient Tests Requested:  Patient orthopedic follow-up in 10 to 14 days  Outpatient establishment of PCP care    Reason for Admission: Fracture of left hip    Hospital Course:   Willa Blair is a 48 y.o. female patient who originally presented to the hospital on 8/17/2024 due to left hip pain after a fall in the setting of alcohol intoxication.  Patient was  noted to have a fracture of the left hip and underwent left total hip arthroplasty by Dr. Godfrey on 8/17/2024.  Patient was monitored postoperatively and did develop orthostatic hypotension which improved with monitoring off antihypertensives, fluids and compression stockings.  She was evaluated by PT and OT and will be going home with home therapy.  Patient had no evidence of alcohol withdrawal or abuse while inpatient.  She will require outpatient orthopedic follow-up in 1 to 2 weeks as well as establishment of care with a PCP in the surrounding area.  Patient was recommended for oral Eliquis on discharge for DVT prophylaxis for 4 weeks.  She will also be sent with multimodal pain regimen and bowel regimen to avoid constipation.    Please see above list of diagnoses and related plan for additional information.     Condition at Discharge: good    Discharge Day Visit / Exam:   Subjective: Patient seen and examined.  She is sitting in bed.  She notes that her dizziness and positional change dizziness and lightheadedness has resolved.  She has been up moving to the bathroom on her own without issues.  Denies any chest pain, shortness of breath or abdominal pain.  She had a bowel movement.  Her hip pain is well-controlled.  She has no numbness or tingling.  We reviewed her medications at home and importance of follow-up.    Vitals: Blood Pressure: 142/60 (08/22/24 0824)  Pulse: 59 (08/22/24 0824)  Temperature: 98.1 °F (36.7 °C) (08/22/24 0824)  Temp Source: Temporal (08/22/24 0824)  Respirations: 16 (08/22/24 0824)  Weight - Scale: 75.5 kg (166 lb 7.2 oz) (08/17/24 0948)  SpO2: 96 % (08/22/24 0824)  Exam:   Physical Exam  Vitals and nursing note reviewed.   Constitutional:       General: She is not in acute distress.     Appearance: She is well-developed. She is not ill-appearing, toxic-appearing or diaphoretic.   Cardiovascular:      Rate and Rhythm: Normal rate and regular rhythm.   Pulmonary:      Effort: Pulmonary  effort is normal. No respiratory distress.      Breath sounds: Normal breath sounds.   Abdominal:      General: Bowel sounds are normal.      Palpations: Abdomen is soft.      Tenderness: There is no abdominal tenderness.   Musculoskeletal:      Right lower leg: No edema.      Left lower leg: No edema.      Comments: Left hip dressing clean dry intact. Lower legs soft to palpation, non tender   Skin:     General: Skin is warm and dry.   Neurological:      Mental Status: She is alert and oriented to person, place, and time. Mental status is at baseline.   Psychiatric:         Mood and Affect: Mood normal.         Behavior: Behavior normal.        Discussion with Family: She is updating her family.    Discharge instructions/Information to patient and family:   See after visit summary for information provided to patient and family.      Provisions for Follow-Up Care:  See after visit summary for information related to follow-up care and any pertinent home health orders.      Mobility at time of Discharge:   Basic Mobility Inpatient Raw Score: 24  JH-HLM Goal: 8: Walk 250 feet or more  JH-HLM Achieved: 6: Walk 10 steps or more  HLM Goal achieved. Continue to encourage appropriate mobility.     Disposition:   Home with VNA Services (Reminder: Complete face to face encounter)    Planned Readmission: No     Discharge Statement:  I spent 60 minutes discharging the patient. This time was spent on the day of discharge. I had direct contact with the patient on the day of discharge. Greater than 50% of the total time was spent examining patient, answering all patient questions, arranging and discussing plan of care with patient as well as directly providing post-discharge instructions.  Additional time then spent on discharge activities.    Discharge Medications:  See after visit summary for reconciled discharge medications provided to patient and/or family.      **Please Note: This note may have been constructed using a voice  recognition system**

## 2024-08-22 NOTE — NURSING NOTE
Pt's Iv removed. all medications reviewed and repeated back to RN. Pt insisting on leaving without picking up medications. Stated her  would pick them up later today. PCA took patient out in a wheelchair safely to her car.

## 2024-08-22 NOTE — ASSESSMENT & PLAN NOTE
Previously was on amlodipine 5 mg daily, not taking PTA  Patient had severe orthostasis after surgery, monitor off postoperatively  Outpatient follow-up PCP

## 2024-08-22 NOTE — ASSESSMENT & PLAN NOTE
After mechanical falls in setting of alcohol intoxication, no head strike   Status post total left hip arthroplasty posterolateral approach by Dr. Godfrey for femoral neck fracture on 8/17/2024  Allergy to aspirin.  Received Lovenox inpatient.  Transitioned over to oral Eliquis 2.5 mg by mouth twice daily for additional 23 days for DVT prophylaxis. (Complete 4 weeks total)  Discharged with home rehab  Continue multimodal pain control on discharge, patient is aware of side effects and frequency to use these medications  Bowel regimen on discharge to avoid OIC.  Patient had BM 8/20  No further orthostasis  Follow-up Dr. Godfrey outpatient 10 to 14 days.  Patient is aware and referrals in the given on discharge  We discussed red flag symptoms to inform a provider which include but are not limited to increased bruising, bleeding, drainage from the site, fevers or worsening pain, lightheadedness or dizziness. Given referrals for outpatient PCP.

## 2024-08-22 NOTE — ASSESSMENT & PLAN NOTE
Patient has been noted to be orthostatic with positional changes  Improved status post IV fluids, compression stockings and pain regimen changes  No further evidence today

## 2024-08-22 NOTE — RESTORATIVE TECHNICIAN NOTE
Restorative Technician Note      Patient Name: Willa Blair     Restorative Tech Visit Date: 08/22/24  Note Type: Mobility  Patient Position Upon Consult: Supine  Activity Performed: Ambulated; Range of motion  Assistive Device: Roller walker  Patient Position at End of Consult: All needs within reach; Bedside chair

## 2024-08-22 NOTE — ASSESSMENT & PLAN NOTE
Patient with history of laparoscopic sleeve gastrectomy [place] 6/26/2023  A1c is improved  Resume oral B12 supplementation, levels at 271. Discharged with supplement  Avoid NSAIDs

## 2024-08-22 NOTE — ASSESSMENT & PLAN NOTE
Previous history notes patient being on Tresiba 30 units nightly as well as Januvia and oral semaglutide  Her A1c here at 7.7%, sugars are well-controlled  Plan for metformin on discharge, discussed with her side effects and dosing.

## 2024-08-22 NOTE — PLAN OF CARE
Problem: PAIN - ADULT  Goal: Verbalizes/displays adequate comfort level or baseline comfort level  Description: Interventions:  - Encourage patient to monitor pain and request assistance  - Assess pain using appropriate pain scale  - Administer analgesics based on type and severity of pain and evaluate response  - Implement non-pharmacological measures as appropriate and evaluate response  - Consider cultural and social influences on pain and pain management  - Notify physician/advanced practitioner if interventions unsuccessful or patient reports new pain  Outcome: Progressing     Problem: SAFETY ADULT  Goal: Patient will remain free of falls  Description: INTERVENTIONS:  - Educate patient/family on patient safety including physical limitations  - Instruct patient to call for assistance with activity   - Consult OT/PT to assist with strengthening/mobility   - Keep Call bell within reach  - Keep bed low and locked with side rails adjusted as appropriate  - Keep care items and personal belongings within reach  - Initiate and maintain comfort rounds  - Make Fall Risk Sign visible to staff  - Offer Toileting every 2 Hours, in advance of need  - Initiate/Maintain bned alarm  - Apply yellow socks and bracelet for high fall risk patients  - Consider moving patient to room near nurses station  Outcome: Progressing  Goal: Maintain or return to baseline ADL function  Description: INTERVENTIONS:  -  Assess patient's ability to carry out ADLs; assess patient's baseline for ADL function and identify physical deficits which impact ability to perform ADLs (bathing, care of mouth/teeth, toileting, grooming, dressing, etc.)  - Assess/evaluate cause of self-care deficits   - Assess range of motion  - Assess patient's mobility; develop plan if impaired  - Assess patient's need for assistive devices and provide as appropriate  - Encourage maximum independence but intervene and supervise when necessary  - Involve family in  performance of ADLs  - Assess for home care needs following discharge   - Consider OT consult to assist with ADL evaluation and planning for discharge  - Provide patient education as appropriate  Outcome: Progressing  Goal: Maintains/Returns to pre admission functional level  Description: INTERVENTIONS:  - Perform AM-PAC 6 Click Basic Mobility/ Daily Activity assessment daily.  - Set and communicate daily mobility goal to care team and patient/family/caregiver.   - Collaborate with rehabilitation services on mobility goals if consulted  - Perform Range of Motion 3 times a day.  - Reposition patient every 2 hours.  - Dangle patient 3 times a day  - Stand patient 3 times a day  - Ambulate patient 3 times a day  - Out of bed to chair 3 times a day   - Out of bed for meals 3 times a day  - Out of bed for toileting  - Record patient progress and toleration of activity level   Outcome: Progressing     Problem: DISCHARGE PLANNING  Goal: Discharge to home or other facility with appropriate resources  Description: INTERVENTIONS:  - Identify barriers to discharge w/patient and caregiver  - Arrange for needed discharge resources and transportation as appropriate  - Identify discharge learning needs (meds, wound care, etc.)  - Arrange for interpretive services to assist at discharge as needed  - Refer to Case Management Department for coordinating discharge planning if the patient needs post-hospital services based on physician/advanced practitioner order or complex needs related to functional status, cognitive ability, or social support system  Outcome: Progressing     Problem: Knowledge Deficit  Goal: Patient/family/caregiver demonstrates understanding of disease process, treatment plan, medications, and discharge instructions  Description: Complete learning assessment and assess knowledge base.  Interventions:  - Provide teaching at level of understanding  - Provide teaching via preferred learning methods  Outcome:  Progressing

## 2024-08-23 ENCOUNTER — HOME CARE VISIT (OUTPATIENT)
Dept: HOME HEALTH SERVICES | Facility: HOME HEALTHCARE | Age: 49
End: 2024-08-23
Payer: COMMERCIAL

## 2024-08-23 ENCOUNTER — HOME CARE VISIT (OUTPATIENT)
Dept: HOME HEALTH SERVICES | Facility: HOME HEALTHCARE | Age: 49
End: 2024-08-23

## 2024-08-23 VITALS — DIASTOLIC BLOOD PRESSURE: 64 MMHG | HEART RATE: 68 BPM | OXYGEN SATURATION: 98 % | SYSTOLIC BLOOD PRESSURE: 122 MMHG

## 2024-08-23 PROCEDURE — 400013 VN SOC

## 2024-08-23 PROCEDURE — G0151 HHCP-SERV OF PT,EA 15 MIN: HCPCS

## 2024-08-23 NOTE — UTILIZATION REVIEW
NOTIFICATION OF ADMISSION DISCHARGE   This is a Notification of Discharge from [facility]. Please be advised that this patient has been discharge from our facility. Below you will find the admission and discharge date and time including the patient’s disposition.   UTILIZATION REVIEW CONTACT:  Suzie Galeas MA  Utilization   Network Utilization Review Department  Phone: [phone] x carefully listen to the prompts. All voicemails are confidential.  Email: [email or web address]     ADMISSION INFORMATION  PRESENTATION DATE: 8/17/2024  3:17 AM  OBERVATION ADMISSION DATE: 08/17/2024 0437  INPATIENT ADMISSION DATE: 8/18/24  2:07 PM   DISCHARGE DATE: 8/22/2024 12:10 PM   DISPOSITION:Home with Home Health Care    Network Utilization Review Department  ATTENTION: Please call with any questions or concerns to [phone] and carefully listen to the prompts so that you are directed to the right person. All voicemails are confidential.   For Discharge needs, contact Care Management DC Support Team at [phone] opt. 2  Send all requests for admission clinical reviews, approved or denied determinations and any other requests to dedicated fax number below belonging to the campus where the patient is receiving treatment. List of dedicated fax numbers for the Facilities:  FACILITY NAME UR FAX NUMBER   ADMISSION DENIALS (Administrative/Medical Necessity) [phone]   DISCHARGE SUPPORT TEAM ([facility]) [phone]   PARENT CHILD HEALTH (Maternity/NICU/Pediatrics) [phone]   [facility] [phone]   [facility] [phone]   [facility] [phone]   [facility] [phone]   [facility] [phone]   [facility] [phone]   [facility] [phone]   [facility]  [facility] [phone]   [facility] [phone]   [facility] [phone]   [facility] [phone]   [facility] [phone]

## 2024-08-28 LAB
DME PARACHUTE DELIVERY DATE ACTUAL: NORMAL
DME PARACHUTE DELIVERY DATE ACTUAL: NORMAL
DME PARACHUTE DELIVERY DATE REQUESTED: NORMAL
DME PARACHUTE DELIVERY DATE REQUESTED: NORMAL
DME PARACHUTE ITEM DESCRIPTION: NORMAL
DME PARACHUTE ITEM DESCRIPTION: NORMAL
DME PARACHUTE ORDER STATUS: NORMAL
DME PARACHUTE ORDER STATUS: NORMAL
DME PARACHUTE SUPPLIER NAME: NORMAL
DME PARACHUTE SUPPLIER NAME: NORMAL
DME PARACHUTE SUPPLIER PHONE: NORMAL
DME PARACHUTE SUPPLIER PHONE: NORMAL

## (undated) DEVICE — COBAN 4 IN STERILE

## (undated) DEVICE — GLOVE SRG BIOGEL ORTHOPEDIC 7.5

## (undated) DEVICE — YELLOW BOAT

## (undated) DEVICE — NEEDLE HYPO 23G X 1-1/2 IN

## (undated) DEVICE — HANDPIECE SET WITH RETRACTABLE COAXIAL FAN SPRAY TIP AND SUCTION TUBE: Brand: INTERPULSE

## (undated) DEVICE — SYRINGE 20ML LL

## (undated) DEVICE — SAW BLADE RECIPROCATING SINGLE SIDED 258 ORTHO

## (undated) DEVICE — ASTOUND STANDARD SURGICAL GOWN, XXL: Brand: CONVERTORS

## (undated) DEVICE — 3M™ STERI-STRIP™ COMPOUND BENZOIN TINCTURE 40 BAGS/CARTON 4 CARTONS/CASE C1544: Brand: 3M™ STERI-STRIP™

## (undated) DEVICE — GLOVE SRG BIOGEL 7.5

## (undated) DEVICE — OCCLUSIVE GAUZE STRIP,3% BISMUTH TRIBROMOPHENATE IN PETROLATUM BLEND: Brand: XEROFORM

## (undated) DEVICE — NEEDLE 18 G X 1 1/2 SAFETY

## (undated) DEVICE — SUT STRATAFIX SPIRAL PLUS 3-0 PS-2 30 X 30 CM SXMP2B408

## (undated) DEVICE — SUT VICRYL 2-0 CT-1 27 IN J259H

## (undated) DEVICE — 5065 IMPAD REGULAR PAIR: Brand: A-V IMPULSE

## (undated) DEVICE — IMPERVIOUS STOCKINETTE: Brand: DEROYAL

## (undated) DEVICE — BETHLEHEM UNIV MAJOR ORTHO,KIT: Brand: CARDINAL HEALTH

## (undated) DEVICE — BIPOLAR SEALER 23-113-1 AQM 2.3: Brand: AQUAMANTYS™

## (undated) DEVICE — BASIC SINGLE BASIN 2-LF: Brand: MEDLINE INDUSTRIES, INC.

## (undated) DEVICE — 3M™ IOBAN™ 2 ANTIMICROBIAL INCISE DRAPE 6650EZ: Brand: IOBAN™ 2

## (undated) DEVICE — CAPIT HIP COP -CMNT/POR-ACTIS

## (undated) DEVICE — DRAPE EQUIPMENT RF WAND

## (undated) DEVICE — SUT VICRYL 0 CT-1 27 IN J260H

## (undated) DEVICE — SUT VICRYL 1 CT-1 27 IN J261H

## (undated) DEVICE — 450 ML BOTTLE OF 0.05% CHLORHEXIDINE GLUCONATE IN 99.95% STERILE WATER FOR IRRIGATION, USP AND APPLICATOR.: Brand: IRRISEPT ANTIMICROBIAL WOUND LAVAGE

## (undated) DEVICE — ELECTRODE BLADE E-Z CLEAN 4IN -0014A

## (undated) DEVICE — 3M™ STERI-STRIP™ REINFORCED ADHESIVE SKIN CLOSURES, R1547, 1/2 IN X 4 IN (12 MM X 100 MM), 6 STRIPS/ENVELOPE: Brand: 3M™ STERI-STRIP™

## (undated) DEVICE — GLOVE INDICATOR PI UNDERGLOVE SZ 8 BLUE

## (undated) DEVICE — MEDI-VAC YANKAUER SUCTION HANDLE W/BULBOUS AND CONTROL VENT: Brand: CARDINAL HEALTH

## (undated) DEVICE — ABDUCTION PILLOW FOAM POSITIONER: Brand: CARDINAL HEALTH

## (undated) DEVICE — HOOD: Brand: FLYTE, SURGICOOL